# Patient Record
Sex: MALE | Race: BLACK OR AFRICAN AMERICAN
[De-identification: names, ages, dates, MRNs, and addresses within clinical notes are randomized per-mention and may not be internally consistent; named-entity substitution may affect disease eponyms.]

---

## 2017-01-20 NOTE — EMERGENCY ROOM REPORT
History of Present Illness


General


Chief Complaint:  Nausea, Vomiting, and Diarrhea


Source:  Patient, Medical Record





Present Illness


HPI


50 YO M with 2 days of hematemesis, abd pain, hematuria.  States called Dr Guillermo yesterday, who told him to come to ED but he didnt because "I had stuff 

to take care of." States abd pain is due to "chronic pancreatitis" and 

"gastroparesis."  No vomiting blood today. No coughing blood.  No melena or 

blood in stool.


Allergies:  


Coded Allergies:  


     IODINATED CONTRAST MEDIA - IV DYE (Verified  Allergy, Severe, Shortness of 

Breath, 9/16/15)


     DORIPENEM (Verified  Allergy, Intermediate, Itching, 9/16/15)


     KETOROLAC TROMETHAMINE (Verified  Allergy, Intermediate, Hives, 9/16/15)


     TRAMADOL (Verified  Allergy, Intermediate, Hives, 9/16/15)


     ASPIRIN (Verified  Allergy, Mild, 12/7/10)





Patient History


Past Medical History:  see triage record, old chart reviewed


Past Surgical History:  other - Lapaortomy for "cancer" but "I didnt have it."


Pertinent Family History:  none


Social History:  Denies: alcohol use, drug use, smoking


Immunizations:  UTD


Reviewed Nursing Documentation:  PMH: Agreed, PSxH: Agreed





Nursing Documentation-PMH


Past Medical History:  No History, Except For


Hx Cardiac Problems:  Yes - CHF


Hx Hypertension:  Yes


Hx Pacemaker:  No - PE


Hx COPD:  No - PE, IVC filter


Hx Diabetes:  Yes


Hx Cancer:  No


Hx Gastrointestinal Problems:  Yes


Hx Dialysis:  No - DIVERTICULITIS


Hx Neurological Problems:  Yes


Hx Concentration Difficulty:  Yes


Hx Dizziness:  Yes


Hx Syncope:  Yes


Hx Weakness:  Yes


Hx Neurologic Surgery:  No





Review of Systems


All Other Systems:  negative except mentioned in HPI





Physical Exam





Vital Signs








  Date Time  Temp Pulse Resp B/P Pulse Ox O2 Delivery O2 Flow Rate FiO2


 


1/20/17 19:46 97.2 90 21 147/72 100 Room Air  








Sp02 EP Interpretation:  reviewed, normal


General Appearance:  normal inspection, well appearing, no apparent distress, 

alert


Head:  normocephalic, atraumatic


Eyes:  bilateral eye EOMI, bilateral eye PERRL


ENT:  normal ENT inspection, hearing grossly normal, normal voice


Neck:  normal inspection, full range of motion, supple, no bony tend


Respiratory:  normal inspection, lungs clear, normal breath sounds, no 

respiratory distress, no retraction, no wheezing


Cardiovascular #1:  regular rate, rhythm, no edema


Gastrointestinal:  normal inspection, normal bowel sounds, non tender, soft, no 

guarding, no hernia, other - midline laparatomy scar


Genitourinary:  no CVA tenderness


Neurologic:  normal inspection, alert, responsive, speech normal


Psychiatric:  normal inspection, judgement/insight normal, mood/affect normal


Skin:  normal inspection, normal color, no rash





Medical Decision Making


Diagnostic Impression:  


 Primary Impression:  


 Abdominal pain


 Qualified Codes:  R10.84 - Generalized abdominal pain


 Additional Impressions:  


 Hypokalemia


 Hypocalcemia


ER Course


50 YO M with alleged hematemesis yesterday and abd pain.  VSS. Afebrile.


No vomiting blood in ED.


LabsL H&H Stable.


Critically low Ca and K


Repleted with Calcium chloride and PO and IV K


Endorsed to Dr Guillermo at 1017pm for med/surg admission


CTAP in progress - endorsed to Dr Borjas in ED to followup CTAP results and 

inform Dr Guillermo of any acute findings.


Rhythm Strip Diag. Results


EP Interpretation:  yes


Rate:  91


Rhythm:  NSR, no PVC's, no ectopy





Last Vital Signs








  Date Time  Temp Pulse Resp B/P Pulse Ox O2 Delivery O2 Flow Rate FiO2


 


1/20/17 19:56  86 19 135/81 95 Room Air  


 


1/20/17 19:46 97.2       








Status:  improved


Disposition:  ADMITTED AS INPATIENT


Condition:  Serious


Referrals:  


TUCKER GUILLERMO (PCP)











FER CANNON M.D. Jan 20, 2017 22:18

## 2017-01-21 NOTE — HISTORY AND PHYSICAL REPORT
DATE OF ADMISSION:  01/20/2017



TIME SEEN:  8 a.m.



ATTENDING PHYSICIAN:  Raimundo Zambrano D.O.



CONSULTANTS:

1. Sudha Light M.D.

2. Behnoush Zarrini, M.D.

3. Fabian Cleaning M.D.

4. Josh Cleveland M.D.

5. Salomón Parmar M.D.

6. Dr. Pedraza.



CHIEF COMPLAINT:  Nausea, vomiting, GI bleed, hypokalemia.



BRIEF HISTORY:  This is a 51-year-old male who lives at home presents

to [facility] last night with increased nausea, vomiting, GI bleed,

exacerbation of his chronic pain and hypokalemia.  The patient was

admitted to medical floor for further treatment.  Currently, slightly

anxious in bed.



PAST MEDICAL HISTORY:  Include hypertension., diabetes,

gastroparesis, chronic pain, anxiety.



PAST SURGICAL HISTORY:  Stomach surgery.



MEDICATIONS:  Morphine, Vasotec, Carafate, warfarin, Tylenol,

MiraLAX, Zofran, Vistaril, Benadryl, Mylanta, nitroglycerin injection.



ALLERGIES:  Contrast, tramadol, Toradol.



SOCIAL HISTORY:  No smoking.  No alcohol.  No intravenous drug use.



FAMILY HISTORY:  Noncontributory.



REVIEW OF SYSTEMS:  No chest pain/shortness of breath.  Slight

nausea, vomiting.  No diarrhea.



PHYSICAL EXAMINATION:

GENERAL:  The patient is anxious in bed, alert and oriented x3, in no

acute distress.

VITAL SIGNS:  Temperature 97 degrees, pulse 70, respiratory rate 20,

and blood pressure 117/67.

CARDIOVASCULAR:  Distant without murmur

LUNGS:

ABDOMEN:  Bowel sounds positive.  Nontender and nondistended.

EXTREMITIES:  No cyanosis, clubbing, or edema.

NEUROLOGIC:  Cranial nerves II through XII are grossly intact.  Deep

tendon reflexes 2+/4.  Muscle strength 4/5.



LABORATORY DATA:  Show hemoglobin 11.9, otherwise CBC is normal.  BMP

shows creatinine 1.2, yesterday potassium 2.7 now is 4.8, glucose today

111.  Amylase 221.  INR 1.2.  Urinalysis shows leukocyte esterase 1+.



ASSESSMENT:

1. Nausea and vomiting.

2. Urinary tract infection.

3. Gastrointestinal bleeding.

4. Anemia.

5. Hypotension.

6. Diabetes.

7. Gastroparesis.

8. Hypokalemia.

9. Anxiety.

10. Chronic pain.



PLAN:

1. Continue premedications.

2. Antibiotics per Infectious Diseases.

3. GI followup.

4. Blood pressure and blood sugar control.

5. Dietary followup.

6. OT/OT and dietary evaluation.

7. CBC and BMP in the morning.

8. We will continue to follow this patient



Dr. Light, Dr. Peralta, Dr. Cleaning, Dr. Cleveland, Dr. Parmar,

Dr. Pedraza, Dr. Cantu consult. We will continue to follow this patient









  ______________________________________________

  Raimundo Zambrano D.O.





DR:  Glen

D:  01/21/2017 08:52

T:  01/21/2017 19:54

JOB#:  [id number]

CC:

## 2017-01-21 NOTE — DIAGNOSTIC IMAGING REPORT
Indication: Nausea and vomiting



Technique: CT scan of the abdomen and pelvis utilizing automated exposure control

without intravenous or oral contrast. Axial, sagittal and coronal images were

obtained.



CT dose: Total DLP 1029 mGycm; CTDI vol 18.4 mGy



Comparison: 12/7/14



Findings:



Evaluation of the solid organs is limited without intravenous contrast material.

There is atelectasis in the lung bases. There is a small hiatal hernia. Liver and

adrenal glands are grossly unremarkable. Spleen and pancreas are grossly

unremarkable. IVC filter is present. No CT evident gallstones are identified. There

is a nonobstructive calculus of the left kidney measuring 6 mm. There is 

no

hydronephrosis or ureteral calculi. The appendix is normal. There is no 

definitive

mechanical bowel obstruction. Colonic diverticulosis is noted without

diverticulitis. Bladder is grossly unremarkable. Abdominal aorta is normal in

caliber. Osseous structures demonstrate no acute abnormality.



Impression:



Limited evaluation of solid organs and bowel without intravenous or oral contrast.



Nonobstructive left renal calculus. No hydronephrosis.



IVC filter.



Colonic diverticulosis.



Small hiatal hernia.



Other findings as above.



The CT scanner at [facility] is accredited by the [employer] College 

of

Radiology and the scans are performed using protocols designed to limit 

radiation

exposure to as low as reasonably achievable to attain images of sufficient

resolution adequate for diagnostic evaluation.

## 2017-01-21 NOTE — INFECTIOUS DISEASES PROG NOTE
Assessment/Plan


Problems:  


(1) UTI (urinary tract infection)


Assessment & Plan:  will start ceftriaxon empirically and send urine for culture





(2) Hypokalemia


Assessment & Plan:  suspect dehydration related from vomiting, replace as needed

, continue IVF for hydration





(3) Hypocalcemia


Assessment & Plan:  most likely due to GI loss, replace as needed, monitor 

calcium level





(4) UGIB (upper gastrointestinal bleed)


Assessment & Plan:  keep NPO, consult GI, monitor H&H, transfuse as needed





(5) Intractable abdominal pain


Assessment & Plan:  continue pain management as per primary , consider CT 

abdomen if not improved








Subjective


Allergies:  


Coded Allergies:  


     IODINATED CONTRAST MEDIA - IV DYE (Verified  Allergy, Severe, Shortness of 

Breath, 9/16/15)


     DORIPENEM (Verified  Allergy, Intermediate, Itching, 9/16/15)


     KETOROLAC TROMETHAMINE (Verified  Allergy, Intermediate, Hives, 9/16/15)


     TRAMADOL (Verified  Allergy, Intermediate, Hives, 9/16/15)


     ASPIRIN (Verified  Allergy, Mild, 12/7/10)





Objective


Vital Signs





Last 24 Hour Vital Signs








  Date Time  Temp Pulse Resp B/P Pulse Ox O2 Delivery O2 Flow Rate FiO2


 


1/21/17 08:47    117/67    


 


1/21/17 08:35 97.0 70 20 117/67 96 Room Air  


 


1/21/17 04:00 97.5 74 20 126/85 98 Room Air  


 


1/21/17 02:28 97.2 62 21 133/88 99 Room Air  


 


1/21/17 02:20 97.3 55 20 142/89 91 Room Air  


 


1/21/17 01:48  62 21 133/88 99 Room Air  


 


1/21/17 00:32  75 12 130/73 97 Room Air  


 


1/20/17 22:52 97.2       


 


1/20/17 22:50  65 16 143/77 98 Room Air  


 


1/20/17 19:56  86 19 135/81 95 Room Air  


 


1/20/17 19:46 97.2 90 21 147/72 100 Room Air  








Height (Feet):  6


Height (Inches):  1.00


Weight (Pounds):  239





Laboratory Tests








Test


  1/20/17


20:58 1/20/17


22:00 1/21/17


03:20


 


Urine Color Yellow    


 


Urine Appearance Cloudy    


 


Urine pH 5 (4.5-8.0)    


 


Urine Specific Gravity


  1.025


(1.005-1.035) 


  


 


 


Urine Protein


  2+ (NEGATIVE)


H 


  


 


 


Urine Glucose (UA)


  Negative


(NEGATIVE) 


  


 


 


Urine Ketones


  1+ (NEGATIVE)


H 


  


 


 


Urine Occult Blood


  5+ (NEGATIVE)


H 


  


 


 


Urine Nitrite


  Negative


(NEGATIVE) 


  


 


 


Urine Bilirubin


  Negative


(NEGATIVE) 


  


 


 


Urine Urobilinogen


  Normal MG/DL


(0.0-1.0) 


  


 


 


Urine Leukocyte Esterase


  1+ (NEGATIVE)


H 


  


 


 


Urine RBC


  Tntc /HPF (0 -


0)  H 


  


 


 


Urine WBC


  Tntc /HPF (0 -


0)  H 


  


 


 


Urine Squamous Epithelial


Cells Few /LPF


(NONE/OCC) 


  


 


 


Urine Bacteria


  Moderate /HPF


(NONE)  H 


  


 


 


Sodium Level


  148 mEQ/L


(135-145)  H 


  143 mEQ/L


(135-145)


 


Potassium Level


  2.7 mEQ/L


(3.4-4.9)  *L 


  4.8 mEQ/L


(3.4-4.9)  #


 


Chloride Level


  120 mEQ/L


()  H 


  106 mEQ/L


()


 


Carbon Dioxide Level


  16 mEQ/L


(20-30)  L 


  23 mEQ/L


(20-30)


 


Anion Gap 12 (5-15)    14 (5-15)  


 


Blood Urea Nitrogen


  12 mg/dL


(7-23) 


  17 mg/dL


(7-23)


 


Creatinine


  1.1 mg/dL


(0.7-1.2) 


  1.5 mg/dL


(0.7-1.2)  H


 


Estimat Glomerular Filtration


Rate > 60 mL/min


(>60) 


  59.5 mL/min


(>60)


 


Glucose Level


  85 mg/dL


() 


  111 mg/dL


()  H


 


Calcium Level


  5.6 mg/dL


(8.6-10.2)  *L 


  9.5 mg/dL


(8.6-10.2)  #


 


Total Bilirubin


  < 0.2 mg/dL


(0.0-1.2) 


  0.3 mg/dL


(0.0-1.2)


 


Aspartate Amino Transf


(AST/SGOT) 16 U/L (5-40)  


  


  27 U/L (5-40)  


 


 


Alanine Aminotransferase


(ALT/SGPT) 12 U/L (3-41)  


  


  20 U/L (3-41)  


 


 


Alkaline Phosphatase


  73 U/L


() 


  132 U/L


()  H


 


Total Protein


  5.0 g/dL


(6.6-8.7)  L 


  8.2 g/dL


(6.6-8.7)  #


 


Albumin


  2.5 g/dL


(3.5-5.2)  L 


  4.1 g/dL


(3.5-5.2)


 


Globulin 2.5 g/dL    4.1 g/dL  


 


Albumin/Globulin Ratio 1.0 (1.0-2.7)    1.0 (1.0-2.7)  


 


Lipase 53 U/L (< 60)    


 


White Blood Count


  


  5.2 K/UL


(4.8-10.8) 5.0 K/UL


(4.8-10.8)


 


Red Blood Count


  


  4.38 M/UL


(4.70-6.10)  L 4.38 M/UL


(4.70-6.10)  L


 


Hemoglobin


  


  11.8 G/DL


(14.2-18.0)  L 11.9 G/DL


(14.2-18.0)  L


 


Hematocrit


  


  39.2 %


(42.0-52.0)  L 38.0 %


(42.0-52.0)  L


 


Mean Corpuscular Volume  89 FL (80-99)   87 FL (80-99)  


 


Mean Corpuscular Hemoglobin


  


  26.8 PG


(27.0-31.0)  L 27.1 PG


(27.0-31.0)


 


Mean Corpuscular Hemoglobin


Concent 


  30.0 G/DL


(32.0-36.0)  L 31.2 G/DL


(32.0-36.0)  L


 


Red Cell Distribution Width


  


  15.4 %


(11.6-14.8)  H 15.0 %


(11.6-14.8)  H


 


Platelet Count


  


  161 K/UL


(150-450) 172 K/UL


(150-450)


 


Mean Platelet Volume


  


  6.9 FL


(6.5-10.1) 7.1 FL


(6.5-10.1)


 


Neutrophils (%) (Auto)


  


  44.6 %


(45.0-75.0)  L 42.1 %


(45.0-75.0)  L


 


Lymphocytes (%) (Auto)


  


  39.1 %


(20.0-45.0) 44.6 %


(20.0-45.0)


 


Monocytes (%) (Auto)


  


  12.8 %


(1.0-10.0)  H 9.0 %


(1.0-10.0)


 


Eosinophils (%) (Auto)


  


  1.9 %


(0.0-3.0) 2.4 %


(0.0-3.0)


 


Basophils (%) (Auto)


  


  1.7 %


(0.0-2.0) 1.8 %


(0.0-2.0)


 


Erythrocyte Sedimentation Rate


  


  


  70 MM/HR


(0-20)  H


 


Reticulocyte Count


  


  


  0.8 %


(0.0-2.0)


 


Prothrombin Time


  


  


  11.9 SEC


(9.30-11.50)  H


 


Prothromb Time International


Ratio 


  


  1.2 (0.9-1.1)


H


 


Activated Partial


Thromboplast Time 


  


  25 SEC (23-33)


 


 


Iron Level


  


  


  61 ug/dL


()


 


Total Iron Binding Capacity


  


  


  394 ug/dL


(250-400)


 


Percent Iron Saturation   15 % (15-50)  


 


Unsaturated Iron Binding


  


  


  333 ug/dL


(112-346)


 


Lactate Dehydrogenase


  


  


  262 U/L


(135-230)  H


 


Amylase Level


  


  


  221 U/L


()  H


 


Carcinoembryonic Antigen   4.8 ng/mL  H


 


Vitamin B12 Level


  


  


  468 pg/mL


(211-946)


 


Folate   Pending  











Current Medications








 Medications


  (Trade)  Dose


 Ordered  Sig/Kirsten


 Route


 PRN Reason  Start Time


 Stop Time Status Last Admin


Dose Admin


 


 Acetaminophen


  (Tylenol)  650 mg  Q4H  PRN


 ORAL


 fever  1/20/17 23:30


 2/19/17 23:29   


 


 


 Al Hydroxide/Mg


 Hydroxide


  (Mylanta II)  30 ml  Q6H  PRN


 ORAL


 dyspepsia  1/20/17 23:30


 2/19/17 23:29   


 


 


 Dextrose     STAT  PRN


 IV


 Hypoglycemia  1/20/17 23:30


 2/19/17 23:29   


 


 


 Dextrose/Sodium


 Chloride


  (D5 0.45% NS)  1,000 ml @ 


 50 mls/hr  Q20H


 IV


   1/21/17 07:00


 2/20/17 06:59  1/21/17 07:34


 


 


 Diphenhydramine


 HCl


  (Benadryl)  25 mg  Q6H  PRN


 ORAL


 Itching/Pruritis  1/20/17 23:30


 2/19/17 23:29  1/21/17 07:55


 


 


 Enalapril Maleate


  (Vasotec)  10 mg  DAILY


 ORAL


   1/21/17 09:00


 2/20/17 08:59   


 


 


 Morphine Sulfate


  (Morphine


 Sulfate)  4 mg  Q4H  PRN


 IVP


 For Pain (Moderate to Severe)  1/21/17 10:45


 1/28/17 10:44   


 


 


 Nitroglycerin


  (Ntg)  0.4 mg  Q5M X 3 DOSES PRN


 SL


 Prn Chest Pain  1/20/17 23:30


 2/19/17 23:29   


 


 


 Ondansetron HCl


  (Zofran)  4 mg  Q6H  PRN


 IVP


 Nausea & Vomiting  1/20/17 23:30


 2/19/17 23:29  1/21/17 07:54


 


 


 Polyethylene


 Glycol


  (Miralax)  17 gm  HSPRN  PRN


 ORAL


 Constipation  1/20/17 23:30


 2/19/17 23:29   


 


 


 Sucralfate


  (Carafate)  1 gm  FOUR TIMES A  DAY


 ORAL


   1/21/17 09:00


 2/20/17 08:59   


 


 


 Temazepam


  (Restoril)  15 mg  HSPRN  PRN


 ORAL


 Insomnia  1/20/17 23:30


 1/27/17 23:29   


 


 


 Warfarin Sodium


  (Coumadin per


 pharmacy)  1 ea  DAILYPRN  PRN


 MISC


 Per rx protocol  1/21/17 07:00


 2/20/17 06:59   


 

















Eitan Cantu M.D. Jan 21, 2017 09:13

## 2017-01-22 NOTE — GENERAL PROGRESS NOTE
Assessment/Plan


Problem List:  


(1) Renal insufficiency


ICD Codes:  N28.9 - Disorder of kidney and ureter, unspecified


SNOMED:  632637333


(2) Intractable vomiting


ICD Codes:  R11.10 - Vomiting, unspecified


SNOMED:  610691932


(3) Abdominal pain of unknown etiology


ICD Codes:  R10.9 - Unspecified abdominal pain


SNOMED:  700322412


(4) UTI (urinary tract infection)


ICD Codes:  N39.0 - Urinary tract infection, site not specified


SNOMED:  82401715


(5) Intractable abdominal pain


ICD Codes:  R10.9 - Intractable abdominal pain


SNOMED:  05787894


(6) Hypokalemia


ICD Codes:  E87.6 - Hypokalemia


SNOMED:  86651765


(7) GI bleeding


(8) Anemia


ICD Codes:  D64.9 - Anemia, unspecified


SNOMED:  536370438


Status:  stable, progressing


Assessment/Plan


ot pt diet pain control gi f/u cbc bmp am





Subjective


Constitutional:  Reports: weakness


Allergies:  


Coded Allergies:  


     IODINATED CONTRAST MEDIA - IV DYE (Verified  Allergy, Severe, Shortness of 

Breath, 9/16/15)


     DORIPENEM (Verified  Allergy, Intermediate, Itching, 9/16/15)


     KETOROLAC TROMETHAMINE (Verified  Allergy, Intermediate, Hives, 9/16/15)


     TRAMADOL (Verified  Allergy, Intermediate, Hives, 9/16/15)


     ASPIRIN (Verified  Allergy, Mild, 12/7/10)


All Systems:  reviewed and negative except above


Subjective


sleepy calm sl nausea / gen pain





Objective





Last 24 Hour Vital Signs








  Date Time  Temp Pulse Resp B/P Pulse Ox O2 Delivery O2 Flow Rate FiO2


 


1/22/17 08:00 97.7 73 20 123/84 97 Room Air  


 


1/22/17 04:00 97.1 72 20 122/70 98 Room Air  


 


1/22/17 00:00 97.9 78 18 130/72 99 Room Air  


 


1/21/17 21:03 97.2       


 


1/21/17 20:00 97.7 80 20 149/91 100 Room Air  


 


1/21/17 16:30 97.2 90 20 126/74 96 Room Air  

















Intake and Output  


 


 1/21/17 1/22/17





 19:00 07:00


 


Intake Total 915 ml 745 ml


 


Balance 915 ml 745 ml


 


  


 


Intake Oral 360 ml 120 ml


 


IV Total 555 ml 625 ml


 


# Voids 2 2








Laboratory Tests


1/22/17 04:35: 


White Blood Count 4.8, Red Blood Count 4.19L, Hemoglobin 11.3L, Hematocrit 37.9L

, Mean Corpuscular Volume 90, Mean Corpuscular Hemoglobin 26.9L, Mean 

Corpuscular Hemoglobin Concent 29.7L, Red Cell Distribution Width 15.0H, 

Platelet Count 162, Mean Platelet Volume 7.4, Neutrophils (%) (Auto) 45.0, 

Lymphocytes (%) (Auto) 37.6, Monocytes (%) (Auto) 11.4H, Eosinophils (%) (Auto) 

3.6H, Basophils (%) (Auto) 2.4H, Prothrombin Time 11.4, Prothromb Time 

International Ratio 1.1, Activated Partial Thromboplast Time 23, Sodium Level 

143, Potassium Level 4.9, Chloride Level 103, Carbon Dioxide Level 27, Anion 

Gap 13, Blood Urea Nitrogen 21, Creatinine 1.7H, Estimat Glomerular Filtration 

Rate 51.5, Glucose Level 119H, Calcium Level 9.0, Amylase Level 182H, Lipase 96H


Height (Feet):  6


Height (Inches):  1.00


Weight (Pounds):  239


General Appearance:  lethargic


EENT:  normal ENT inspection


Neck:  normal alignment


Cardiovascular:  normal peripheral pulses, normal rate, regular rhythm


Respiratory/Chest:  chest wall non-tender, lungs clear, normal breath sounds


Abdomen:  normal bowel sounds, non tender, soft


Extremities:  normal inspection


Edema:  no edema noted Arm (L), no edema noted Arm (R), no edema noted Leg (L), 

no edema noted Leg (R), no edema noted Pedal (L), no edema noted Pedal (R), no 

edema noted Generalized


Neurologic:  motor weakness


Skin:  normal pigmentation, warm/dry











TUCKER GUILLERMO Jan 22, 2017 08:58

## 2017-01-22 NOTE — GENERAL PROGRESS NOTE
Assessment/Plan


Assessment/Plan


IMPRESSION:  


1. Anemia 2/2 GI bleed - currently h/h stable, being evaluated by GI


2. Anemia of chronic disease.


3. Anemia of kidney disease.


4. Chronic kidney disease.


5. Coagulopathy, secondary to Coumadin.


6. Hypercoagulable state.


7. History of deep vein thrombosis s/p ivc filter


8. History of pulmonary embolism s/p ivc filter


9. Status post IVC filter placement.


10. Anticoagulation with Coumadin.


11. Drug-seeking behavior.


 


RECOMMENDATIONS:  


1. Watch count.


2. Watch coagulopathy.


3. Coumadin to continue


4. INR 2-3 goal


5. PRBC transfusion as needed basis, goal >7


6. Followup on ID, GI, pulm recs


7. Skin care.


8.  Staff.





Sincerely,





Caterina Parmar MD





Subjective


Constitutional:  Reports: no symptoms


HEENT:  Reports: no symptoms


Cardiovascular:  Reports: no symptoms


Respiratory:  Reports: no symptoms


Gastrointestinal/Abdominal:  Reports: poor appetite


Genitourinary:  Reports: no symptoms


Neurologic/Psychiatric:  Reports: no symptoms


Endocrine:  Reports: no symptoms


Hematologic/Lymphatic:  Reports: anemia


Allergies:  


Coded Allergies:  


     IODINATED CONTRAST MEDIA - IV DYE (Verified  Allergy, Severe, Shortness of 

Breath, 9/16/15)


     DORIPENEM (Verified  Allergy, Intermediate, Itching, 9/16/15)


     KETOROLAC TROMETHAMINE (Verified  Allergy, Intermediate, Hives, 9/16/15)


     TRAMADOL (Verified  Allergy, Intermediate, Hives, 9/16/15)


     ASPIRIN (Verified  Allergy, Mild, 12/7/10)


Subjective


stable h/h, currently not bleeding





Objective





Last 24 Hour Vital Signs








  Date Time  Temp Pulse Resp B/P Pulse Ox O2 Delivery O2 Flow Rate FiO2


 


1/22/17 09:00    123/84    


 


1/22/17 08:25 97.7       


 


1/22/17 08:00 97.7 73 20 123/84 97 Room Air  


 


1/22/17 04:00 97.1 72 20 122/70 98 Room Air  


 


1/22/17 00:00 97.9 78 18 130/72 99 Room Air  


 


1/21/17 20:00 97.7 80 20 149/91 100 Room Air  


 


1/21/17 16:30 97.2 90 20 126/74 96 Room Air  

















Intake and Output  


 


 1/21/17 1/22/17





 19:00 07:00


 


Intake Total 915 ml 745 ml


 


Balance 915 ml 745 ml


 


  


 


Intake Oral 360 ml 120 ml


 


IV Total 555 ml 625 ml


 


# Voids 2 2








Laboratory Tests


1/22/17 04:35: 


White Blood Count 4.8, Red Blood Count 4.19L, Hemoglobin 11.3L, Hematocrit 37.9L

, Mean Corpuscular Volume 90, Mean Corpuscular Hemoglobin 26.9L, Mean 

Corpuscular Hemoglobin Concent 29.7L, Red Cell Distribution Width 15.0H, 

Platelet Count 162, Mean Platelet Volume 7.4, Neutrophils (%) (Auto) 45.0, 

Lymphocytes (%) (Auto) 37.6, Monocytes (%) (Auto) 11.4H, Eosinophils (%) (Auto) 

3.6H, Basophils (%) (Auto) 2.4H, Prothrombin Time 11.4, Prothromb Time 

International Ratio 1.1, Activated Partial Thromboplast Time 23, Sodium Level 

143, Potassium Level 4.9, Chloride Level 103, Carbon Dioxide Level 27, Anion 

Gap 13, Blood Urea Nitrogen 21, Creatinine 1.7H, Estimat Glomerular Filtration 

Rate 51.5, Glucose Level 119H, Calcium Level 9.0, Amylase Level 182H, Lipase 96H


Height (Feet):  6


Height (Inches):  1.00


Weight (Pounds):  239


General Appearance:  alert


EENT:  TMs normal


Neck:  supple


Cardiovascular:  regular rhythm


Respiratory/Chest:  lungs clear


Abdomen:  non tender


Extremities:  non-tender


Edema:  1+ Leg (L), 1+ Leg (R)


Edema:  mild edema


Neurologic:  alert


Skin:  warm/dry











CATERINA PARMAR Jan 22, 2017 09:36

## 2017-01-22 NOTE — GENERAL PROGRESS NOTE
Assessment/Plan


Problem List:  


(1) DVT (deep venous thrombosis)


ICD Codes:  I82.409 - Acute embolism and thombos unsp deep vn unsp lower 

extremity


SNOMED:  905018105


(2) Nausea, vomiting, and diarrhea


ICD Codes:  R11.2 - Nausea with vomiting, unspecified; R19.7 - Diarrhea, 

unspecified


SNOMED:  9306378


(3) Abdominal pain


ICD Codes:  R10.9 - Unspecified abdominal pain


SNOMED:  91524431


(4) Anemia


ICD Codes:  D64.9 - Anemia, unspecified


SNOMED:  777444696


(5) Diabetes


ICD Codes:  E11.9 - Diabetes


SNOMED:  98945300


(6) Gastritis


ICD Codes:  K29.70 - Gastritis


SNOMED:  5693912


Assessment/Plan


ppi


stable H&H


fu stool ob


pain control


EGd if needed





Subjective


ROS Limited/Unobtainable:  Yes


Allergies:  


Coded Allergies:  


     IODINATED CONTRAST MEDIA - IV DYE (Verified  Allergy, Severe, Shortness of 

Breath, 9/16/15)


     DORIPENEM (Verified  Allergy, Intermediate, Itching, 9/16/15)


     KETOROLAC TROMETHAMINE (Verified  Allergy, Intermediate, Hives, 9/16/15)


     TRAMADOL (Verified  Allergy, Intermediate, Hives, 9/16/15)


     ASPIRIN (Verified  Allergy, Mild, 12/7/10)


Subjective


no event





Objective





Last 24 Hour Vital Signs








  Date Time  Temp Pulse Resp B/P Pulse Ox O2 Delivery O2 Flow Rate FiO2


 


1/22/17 08:00 97.7 73 20 123/84 97 Room Air  


 


1/22/17 04:00 97.1 72 20 122/70 98 Room Air  


 


1/22/17 00:00 97.9 78 18 130/72 99 Room Air  


 


1/21/17 21:03 97.2       


 


1/21/17 20:00 97.7 80 20 149/91 100 Room Air  


 


1/21/17 16:30 97.2 90 20 126/74 96 Room Air  


 


1/21/17 08:47    117/67    


 


1/21/17 08:35 97.0 70 20 117/67 96 Room Air  

















Intake and Output  


 


 1/21/17 1/22/17





 18:59 06:59


 


Intake Total 915 ml 695 ml


 


Balance 915 ml 695 ml


 


  


 


Intake Oral 360 ml 120 ml


 


IV Total 555 ml 575 ml


 


# Voids 2 2








Laboratory Tests


1/22/17 04:35: 


White Blood Count 4.8, Red Blood Count 4.19L, Hemoglobin 11.3L, Hematocrit 37.9L

, Mean Corpuscular Volume 90, Mean Corpuscular Hemoglobin 26.9L, Mean 

Corpuscular Hemoglobin Concent 29.7L, Red Cell Distribution Width 15.0H, 

Platelet Count 162, Mean Platelet Volume 7.4, Neutrophils (%) (Auto) 45.0, 

Lymphocytes (%) (Auto) 37.6, Monocytes (%) (Auto) 11.4H, Eosinophils (%) (Auto) 

3.6H, Basophils (%) (Auto) 2.4H, Prothrombin Time 11.4, Prothromb Time 

International Ratio 1.1, Activated Partial Thromboplast Time 23, Sodium Level 

143, Potassium Level 4.9, Chloride Level 103, Carbon Dioxide Level 27, Anion 

Gap 13, Blood Urea Nitrogen 21, Creatinine 1.7H, Estimat Glomerular Filtration 

Rate 51.5, Glucose Level 119H, Calcium Level 9.0, Amylase Level [Pending], 

Lipase [Pending]


Height (Feet):  6


Height (Inches):  1.00


Weight (Pounds):  239


General Appearance:  alert


EENT:  normal ENT inspection


Neck:  supple


Cardiovascular:  normal rate


Respiratory/Chest:  lungs clear


Abdomen:  normal bowel sounds, non tender, soft


Extremities:  non-tender











LAMAR ESPINO Jan 22, 2017 08:24

## 2017-01-22 NOTE — DIAGNOSTIC IMAGING REPORT
Indication: Chest pain



Technique: XRAY CHEST 1 V



Comparison: 9/16/15



Findings: Cardiomediastinal silhouette is within normal limits. There is no

consolidation or pleural effusion. Left chest catheter is again noted. Osseous

structures are stable.



Impression:



No acute cardiopulmonary disease.

## 2017-01-22 NOTE — NEPHROLOGY PROGRESS NOTE
Assessment/Plan


Problem List:  


(1) Nausea, vomiting, and diarrhea


(2) Abdominal pain


(3) Hypernatremia


(4) UTI (urinary tract infection)


(5) HIV (human immunodeficiency virus infection)


(6) Hematuria


(7) Hypokalemia


Plan


Continue IVF


Continue pain management


Continue NPO


Monitor lytes


Abx per ID


Monitor intake and output


AM labs





Subjective


Constitutional:  Denies: chills, diaphoresis, fever, malaise, no symptoms, other

, weakness


HEENT:  Denies: blurred vision, double vision, ear discharge, ear pain, eye pain

, mouth pain, mouth swelling, no symptoms, nose congestion, nose pain, other, 

tearing, throat pain, throat swelling


Genitourinary:  Reports: flank pain, other - hematuria


Neurologic/Psychiatric:  Denies: anxiety, depressed, emotional problems, 

headache, no symptoms, numbness, other, paresthesia, pre-existing deficit, 

seizure, tingling, tremors, weakness


Subjective


In bed, in no distress





Objective


Objective





Last 24 Hour Vital Signs








  Date Time  Temp Pulse Resp B/P Pulse Ox O2 Delivery O2 Flow Rate FiO2


 


1/22/17 09:00    123/84    


 


1/22/17 08:25 97.7       


 


1/22/17 08:00 97.7 73 20 123/84 97 Room Air  


 


1/22/17 04:00 97.1 72 20 122/70 98 Room Air  


 


1/22/17 00:00 97.9 78 18 130/72 99 Room Air  


 


1/21/17 20:00 97.7 80 20 149/91 100 Room Air  


 


1/21/17 16:30 97.2 90 20 126/74 96 Room Air  

















Intake and Output  


 


 1/21/17 1/22/17





 18:59 06:59


 


Intake Total 915 ml 695 ml


 


Balance 915 ml 695 ml


 


  


 


Intake Oral 360 ml 120 ml


 


IV Total 555 ml 575 ml


 


# Voids 2 2








Laboratory Tests


1/22/17 04:35: 


White Blood Count 4.8, Red Blood Count 4.19L, Hemoglobin 11.3L, Hematocrit 37.9L

, Mean Corpuscular Volume 90, Mean Corpuscular Hemoglobin 26.9L, Mean 

Corpuscular Hemoglobin Concent 29.7L, Red Cell Distribution Width 15.0H, 

Platelet Count 162, Mean Platelet Volume 7.4, Neutrophils (%) (Auto) 45.0, 

Lymphocytes (%) (Auto) 37.6, Monocytes (%) (Auto) 11.4H, Eosinophils (%) (Auto) 

3.6H, Basophils (%) (Auto) 2.4H, Prothrombin Time 11.4, Prothromb Time 

International Ratio 1.1, Activated Partial Thromboplast Time 23, Sodium Level 

143, Potassium Level 4.9, Chloride Level 103, Carbon Dioxide Level 27, Anion 

Gap 13, Blood Urea Nitrogen 21, Creatinine 1.7H, Estimat Glomerular Filtration 

Rate 51.5, Glucose Level 119H, Calcium Level 9.0, Amylase Level 182H, Lipase 96H


Height (Feet):  6


Height (Inches):  1.00


Weight (Pounds):  239


General Appearance:  no apparent distress, alert


EENT:  normal ENT inspection


Neck:  non-tender, normal alignment, supple, normal inspection


Cardiovascular:  normal rate, regular rhythm, no JVD


Respiratory/Chest:  chest wall non-tender, lungs clear, normal breath sounds, 

no respiratory distress


Abdomen:  soft, no organomegaly, no mass, tender


Extremities:  non-tender, normal inspection, no calf tenderness


Neurologic:  alert, oriented x 3, responsive, normal mood/affect











Berenice Carroll N.P. Jan 22, 2017 11:13

## 2017-01-22 NOTE — INFECTIOUS DISEASES PROG NOTE
Assessment/Plan


Problems:  


(1) UTI (urinary tract infection)


Assessment & Plan:  continue  ceftriaxon empirically and send urine for culture





(2) Hypokalemia


Assessment & Plan:  suspect dehydration related from vomiting, replace as needed

, continue IVF for hydration





(3) Hypocalcemia


Assessment & Plan:  most likely due to GI loss, replace as needed, monitor 

calcium level





(4) UGIB (upper gastrointestinal bleed)


Assessment & Plan:  keep NPO, consult GI, monitor H&H, transfuse as needed





(5) Intractable abdominal pain


Assessment & Plan:  continue pain management as per primary ,  CT abdomen and 

pelvis showed nonobstructing stone .








Subjective


Gastrointestinal/Abdominal:  Reports: nausea


Skin:  Reports: other - itching


Allergies:  


Coded Allergies:  


     IODINATED CONTRAST MEDIA - IV DYE (Verified  Allergy, Severe, Shortness of 

Breath, 9/16/15)


     DORIPENEM (Verified  Allergy, Intermediate, Itching, 9/16/15)


     KETOROLAC TROMETHAMINE (Verified  Allergy, Intermediate, Hives, 9/16/15)


     TRAMADOL (Verified  Allergy, Intermediate, Hives, 9/16/15)


     ASPIRIN (Verified  Allergy, Mild, 12/7/10)


All Systems:  reviewed and negative except above





Objective


Vital Signs





Last 24 Hour Vital Signs








  Date Time  Temp Pulse Resp B/P Pulse Ox O2 Delivery O2 Flow Rate FiO2


 


1/22/17 16:02 97.1 80 20 135/82 100   


 


1/22/17 15:34 97.9       


 


1/22/17 12:05 97.9 83 22 119/75 100 Room Air  


 


1/22/17 09:00    123/84    


 


1/22/17 08:00 97.7 73 20 123/84 97 Room Air  


 


1/22/17 04:00 97.1 72 20 122/70 98 Room Air  


 


1/22/17 00:00 97.9 78 18 130/72 99 Room Air  


 


1/21/17 20:00 97.7 80 20 149/91 100 Room Air  


 


1/21/17 16:30 97.2 90 20 126/74 96 Room Air  








Height (Feet):  6


Height (Inches):  1.00


Weight (Pounds):  239


General Appearance:  WD/WN, no acute distress


HEENT:  normocephalic, atraumatic, anicteric, mucous membranes moist, PERRL


Respiratory/Chest:  chest wall non-tender, lungs clear, normal breath sounds, 

no respiratory distress, no accessory muscle use


Cardiovascular:  normal peripheral pulses, normal rate, regular rhythm, no 

gallop/murmur, no JVD


Abdomen:  normal bowel sounds, no organomegaly, non distended, no mass, no scars

, distended, tender


Extremities:  no cyanosis, no clubbing


Skin:  no rash, no lesions, no ulcers





Microbiology








 Date/Time


Source Procedure


Growth Status


 


 


 1/20/17 20:58


Urine,Clean Catch Urine Culture - Preliminary


NO GROWTH AFTER 24 HOURS Resulted











Laboratory Tests








Test


  1/22/17


04:35


 


White Blood Count


  4.8 K/UL


(4.8-10.8)


 


Red Blood Count


  4.19 M/UL


(4.70-6.10)  L


 


Hemoglobin


  11.3 G/DL


(14.2-18.0)  L


 


Hematocrit


  37.9 %


(42.0-52.0)  L


 


Mean Corpuscular Volume 90 FL (80-99)  


 


Mean Corpuscular Hemoglobin


  26.9 PG


(27.0-31.0)  L


 


Mean Corpuscular Hemoglobin


Concent 29.7 G/DL


(32.0-36.0)  L


 


Red Cell Distribution Width


  15.0 %


(11.6-14.8)  H


 


Platelet Count


  162 K/UL


(150-450)


 


Mean Platelet Volume


  7.4 FL


(6.5-10.1)


 


Neutrophils (%) (Auto)


  45.0 %


(45.0-75.0)


 


Lymphocytes (%) (Auto)


  37.6 %


(20.0-45.0)


 


Monocytes (%) (Auto)


  11.4 %


(1.0-10.0)  H


 


Eosinophils (%) (Auto)


  3.6 %


(0.0-3.0)  H


 


Basophils (%) (Auto)


  2.4 %


(0.0-2.0)  H


 


Prothrombin Time


  11.4 SEC


(9.30-11.50)


 


Prothromb Time International


Ratio 1.1 (0.9-1.1)  


 


 


Activated Partial


Thromboplast Time 23 SEC (23-33)


 


 


Sodium Level


  143 mEQ/L


(135-145)


 


Potassium Level


  4.9 mEQ/L


(3.4-4.9)


 


Chloride Level


  103 mEQ/L


()


 


Carbon Dioxide Level


  27 mEQ/L


(20-30)


 


Anion Gap 13 (5-15)  


 


Blood Urea Nitrogen


  21 mg/dL


(7-23)


 


Creatinine


  1.7 mg/dL


(0.7-1.2)  H


 


Estimat Glomerular Filtration


Rate 51.5 mL/min


(>60)


 


Glucose Level


  119 mg/dL


()  H


 


Calcium Level


  9.0 mg/dL


(8.6-10.2)


 


Amylase Level


  182 U/L


()  H


 


Lipase


  96 U/L (< 60)


H











Current Medications








 Medications


  (Trade)  Dose


 Ordered  Sig/Kirsten


 Route


 PRN Reason  Start Time


 Stop Time Status Last Admin


Dose Admin


 


 Acetaminophen


  (Tylenol)  650 mg  Q4H  PRN


 ORAL


 fever  1/20/17 23:30


 2/19/17 23:29   


 


 


 Al Hydroxide/Mg


 Hydroxide


  (Mylanta II)  30 ml  Q6H  PRN


 ORAL


 dyspepsia  1/20/17 23:30


 2/19/17 23:29   


 


 


 Ceftriaxone


 Sodium/Dextrose


  (Rocephin/D5W)  55 ml @ 


 110 mls/hr  Q24H


 IVPB


   1/21/17 11:00


 1/28/17 10:59  1/22/17 11:05


 


 


 Dextrose     STAT  PRN


 IV


 Hypoglycemia  1/20/17 23:30


 2/19/17 23:29   


 


 


 Dextrose/Sodium


 Chloride


  (D5 0.45% NS)  1,000 ml @ 


 50 mls/hr  Q20H


 IV


   1/21/17 07:00


 2/20/17 06:59  1/22/17 00:51


 


 


 Diphenhydramine


 HCl


  (Benadryl)  25 mg  Q6H  PRN


 IVP


 Itching  1/21/17 10:15


 2/20/17 10:14  1/22/17 15:34


 


 


 Enalapril Maleate


  (Vasotec)  10 mg  DAILY


 ORAL


   1/21/17 09:00


 2/20/17 08:59   


 


 


 Morphine Sulfate


 4 mg  4 mg  Q4H  PRN


 IVP


 For Pain (Moderate to Severe)  1/21/17 10:45


 1/28/17 10:44  1/22/17 15:04


 


 


 Nitroglycerin


  (Ntg)  0.4 mg  Q5M X 3 DOSES PRN


 SL


 Prn Chest Pain  1/20/17 23:30


 2/19/17 23:29   


 


 


 Ondansetron HCl


  (Zofran)  4 mg  Q6H  PRN


 IVP


 Nausea & Vomiting  1/20/17 23:30


 2/19/17 23:29  1/22/17 15:04


 


 


 Pantoprazole


  (Protonix)  40 mg  DAILY


 ORAL


   1/22/17 09:00


 2/21/17 08:59   


 


 


 Polyethylene


 Glycol


  (Miralax)  17 gm  HSPRN  PRN


 ORAL


 Constipation  1/20/17 23:30


 2/19/17 23:29   


 


 


 Temazepam


  (Restoril)  15 mg  HSPRN  PRN


 ORAL


 Insomnia  1/20/17 23:30


 1/27/17 23:29   


 


 


 Warfarin Sodium


  (Coumadin per


 pharmacy)  1 ea  DAILYPRN  PRN


 MISC


 Per rx protocol  1/21/17 07:00


 2/20/17 06:59   


 


 


 Warfarin Sodium


  (Coumadin)  5 mg  COUMADIN  ONCE


 ORAL


   1/22/17 17:00


 1/22/17 17:01   


 

















Eitan Cantu M.D. Jan 22, 2017 16:26

## 2017-01-23 NOTE — CARDIOLOGY REPORT
--------------- APPROVED REPORT --------------





EKG Measurement

Heart [id number]

MI [id number]

[id number]

[id number]

[id number]





Normal sinus rhythm with sinus arrhythmia

Normal ECG

## 2017-01-23 NOTE — INFECTIOUS DISEASES PROG NOTE
Assessment/Plan


Problems:  


(1) HIV (human immunodeficiency virus infection)


Assessment & Plan:  with positive screening test, will confirm, and order viral 

load, with CD4 level. patient denied any previous exposure to HIV. except 

multiple blood transfusion in the past





(2) UTI (urinary tract infection)


Assessment & Plan:  continue  ceftriaxon empirically and await urine culture





(3) Hypokalemia


Assessment & Plan:  improved, suspect dehydration related from vomiting, 

replace as needed, continue IVF for hydration





(4) Hypocalcemia


Assessment & Plan:  improved, most likely due to GI loss, replace as needed, 

monitor calcium level





(5) UGIB (upper gastrointestinal bleed)


Assessment & Plan:  keep NPO, may need EGD,  GI is following , monitor H&H, 

transfuse as needed





(6) Intractable abdominal pain


Assessment & Plan:  continue pain management as per primary ,  CT abdomen and 

pelvis showed nonobstructing stone .








Subjective


Constitutional:  Denies: anorexia, chills, drenching sweats, fatigue, fever, no 

symptoms, other


HEENT:  Denies: congestion, coryza, dysphagia, hearing change, no symptoms, 

other, visual change


Respiratory:  Denies: dry cough, no symptoms, other, productive cough, 

shortness of breath


Breasts:  Denies: discharge, no symptoms, other, swelling, tenderness


Cardiovascular:  Denies: chest pain, dyspnea on exertion, no symptoms, other, 

palpitations


Gastrointestinal/Abdominal:  Reports: bloating, constipation, other - pain


Genitourinary:  Denies: dysuria, frequency, hematuria, no symptoms, nocturia, 

other


Neurologic:  Denies: confusion, headache, no symptoms, numbness, other, weakness


Psychiatric:  Denies: anxiety, depression, no symptoms, other


Skin:  Denies: no symptoms, other, rash, ulcer


Allergies:  


Coded Allergies:  


     IODINATED CONTRAST MEDIA - IV DYE (Verified  Allergy, Severe, Shortness of 

Breath, 9/16/15)


     DORIPENEM (Verified  Allergy, Intermediate, Itching, 9/16/15)


     KETOROLAC TROMETHAMINE (Verified  Allergy, Intermediate, Hives, 9/16/15)


     TRAMADOL (Verified  Allergy, Intermediate, Hives, 9/16/15)


     ASPIRIN (Verified  Allergy, Mild, 12/7/10)





Objective


Vital Signs





Last 24 Hour Vital Signs








  Date Time  Temp Pulse Resp B/P Pulse Ox O2 Delivery O2 Flow Rate FiO2


 


1/23/17 16:00 97.7 75 22 152/113 100 Room Air  


 


1/23/17 13:05    145/85    


 


1/23/17 12:24 97.2 70 20 145/85 99 Room Air  


 


1/23/17 08:58 97.7 79  114/76 97 Room Air  


 


1/23/17 04:00 97.3 84 22 136/75 95 Room Air  


 


1/23/17 00:00 97.9 87 20 125/68 97 Room Air  


 


1/22/17 20:27 97.1       


 


1/22/17 20:00 98.4 74 22 139/87 99 Room Air  








Height (Feet):  6


Height (Inches):  1.00


Weight (Pounds):  239


General Appearance:  WD/WN, no acute distress


HEENT:  normocephalic, atraumatic, anicteric, mucous membranes moist


Respiratory/Chest:  chest wall non-tender, lungs clear, normal breath sounds, 

no respiratory distress, no accessory muscle use


Cardiovascular:  normal peripheral pulses, normal rate, regular rhythm, no 

gallop/murmur, no JVD


Abdomen:  normal bowel sounds, soft, non tender, no organomegaly, non distended

, no mass, no scars


Extremities:  no cyanosis, no clubbing


Skin:  no rash, no lesions, no ulcers





Microbiology








 Date/Time


Source Procedure


Growth Status


 


 


 1/20/17 20:58


Urine,Clean Catch Urine Culture - Final


NO GROWTH AFTER 48 HOURS Complete











Laboratory Tests








Test


  1/22/17


21:25


 


White Blood Count


  5.1 K/UL


(4.8-10.8)


 


Red Blood Count


  4.19 M/UL


(4.70-6.10)  L


 


Hemoglobin


  11.2 G/DL


(14.2-18.0)  L


 


Hematocrit


  36.9 %


(42.0-52.0)  L


 


Mean Corpuscular Volume 88 FL (80-99)  


 


Mean Corpuscular Hemoglobin


  26.8 PG


(27.0-31.0)  L


 


Mean Corpuscular Hemoglobin


Concent 30.4 G/DL


(32.0-36.0)  L


 


Red Cell Distribution Width


  14.6 %


(11.6-14.8)


 


Platelet Count


  154 K/UL


(150-450)


 


Mean Platelet Volume


  7.0 FL


(6.5-10.1)


 


Neutrophils (%) (Auto)


  33.7 %


(45.0-75.0)  L


 


Lymphocytes (%) (Auto)


  49.6 %


(20.0-45.0)  H


 


Monocytes (%) (Auto)


  11.4 %


(1.0-10.0)  H


 


Eosinophils (%) (Auto)


  3.8 %


(0.0-3.0)  H


 


Basophils (%) (Auto)


  1.5 %


(0.0-2.0)


 


Prothrombin Time


  13.2 SEC


(9.30-11.50)  H


 


Prothromb Time International


Ratio 1.3 (0.9-1.1)


H











Current Medications








 Medications


  (Trade)  Dose


 Ordered  Sig/Kirsten


 Route


 PRN Reason  Start Time


 Stop Time Status Last Admin


Dose Admin


 


 Acetaminophen


  (Tylenol)  650 mg  Q4H  PRN


 ORAL


 fever  1/20/17 23:30


 2/19/17 23:29   


 


 


 Al Hydroxide/Mg


 Hydroxide


  (Mylanta II)  30 ml  Q6H  PRN


 ORAL


 dyspepsia  1/20/17 23:30


 2/19/17 23:29   


 


 


 Ceftriaxone


 Sodium/Dextrose


  (Rocephin/D5W)  55 ml @ 


 110 mls/hr  Q24H


 IVPB


   1/21/17 11:00


 1/28/17 10:59  1/23/17 12:17


 


 


 Dextrose     STAT  PRN


 IV


 Hypoglycemia  1/20/17 23:30


 2/19/17 23:29   


 


 


 Dextrose/Sodium


 Chloride


  (D5 0.45% NS)  1,000 ml @ 


 50 mls/hr  Q20H


 IV


   1/21/17 07:00


 2/20/17 06:59  1/22/17 22:49


 


 


 Diphenhydramine


 HCl


  (Benadryl)  25 mg  Q6H  PRN


 IVP


 Itching  1/21/17 10:15


 2/20/17 10:14  1/23/17 13:02


 


 


 Enalapril Maleate


  (Vasotec)  10 mg  DAILY


 ORAL


   1/21/17 09:00


 2/20/17 08:59  1/23/17 13:05


 


 


 Morphine Sulfate


 4 mg  4 mg  Q4H  PRN


 IVP


 For Pain (Moderate to Severe)  1/21/17 10:45


 1/28/17 10:44  1/23/17 12:59


 


 


 Nitroglycerin


  (Ntg)  0.4 mg  Q5M X 3 DOSES PRN


 SL


 Prn Chest Pain  1/20/17 23:30


 2/19/17 23:29   


 


 


 Ondansetron HCl


  (Zofran)  4 mg  Q6H  PRN


 IVP


 Nausea & Vomiting  1/20/17 23:30


 2/19/17 23:29  1/23/17 12:55


 


 


 Pantoprazole


  (Protonix)  40 mg  DAILY


 ORAL


   1/22/17 09:00


 2/21/17 08:59  1/23/17 13:06


 


 


 Polyethylene


 Glycol


  (Miralax)  17 gm  HSPRN  PRN


 ORAL


 Constipation  1/20/17 23:30


 2/19/17 23:29   


 


 


 Temazepam


  (Restoril)  15 mg  HSPRN  PRN


 ORAL


 Insomnia  1/20/17 23:30


 1/27/17 23:29   


 

















Eitan Cantu M.D. Jan 23, 2017 17:41

## 2017-01-23 NOTE — GI PROGRESS NOTE
Assessment/Plan


Problems:  


(1) HIV (human immunodeficiency virus infection)


ICD Codes:  Z21 - Asymptomatic human immunodeficiency virus [HIV] infection 

status


SNOMED:  82656457


(2) GI bleeding


(3) Abdominal pain of unknown etiology


ICD Codes:  R10.9 - Unspecified abdominal pain


SNOMED:  385849734


(4) Intractable vomiting


ICD Codes:  R11.10 - Vomiting, unspecified


SNOMED:  560750508


(5) Gastritis


ICD Codes:  K29.70 - Gastritis


SNOMED:  5923246


Status:  stable, unchanged


Status Narrative


Discussed with Dr. Cleveland.


Assessment/Plan


iron panel WNL


stable H&H


elevated lipase


HIV +


coffee grounds per patient





hold EGD at this time


adv to low fat diet


ordered OB stool


ordered utox


ppi


fu labs





Subjective


Gastrointestinal/Abdominal:  Reports: abdominal pain - hx of ulcer





Objective





Last 24 Hour Vital Signs








  Date Time  Temp Pulse Resp B/P Pulse Ox O2 Delivery O2 Flow Rate FiO2


 


1/23/17 12:24 97.2 70 20 145/85 99 Room Air  


 


1/23/17 08:58 97.7 79  114/76 97 Room Air  


 


1/23/17 04:00 97.3 84 22 136/75 95 Room Air  


 


1/23/17 00:00 97.9 87 20 125/68 97 Room Air  


 


1/22/17 20:27 97.1       


 


1/22/17 20:00 98.4 74 22 139/87 99 Room Air  


 


1/22/17 16:02 97.1 80 20 135/82 100   

















Intake and Output  


 


 1/22/17 1/23/17





 19:00 07:00


 


Intake Total 855 ml 740 ml


 


Balance 855 ml 740 ml


 


  


 


Intake Oral 240 ml 240 ml


 


IV Total 615 ml 500 ml


 


# Voids 5 3


 


# Bowel Movements 1 1











Laboratory Tests








Test


  1/22/17


21:25


 


White Blood Count


  5.1 K/UL


(4.8-10.8)


 


Red Blood Count


  4.19 M/UL


(4.70-6.10)  L


 


Hemoglobin


  11.2 G/DL


(14.2-18.0)  L


 


Hematocrit


  36.9 %


(42.0-52.0)  L


 


Mean Corpuscular Volume 88 FL (80-99)  


 


Mean Corpuscular Hemoglobin


  26.8 PG


(27.0-31.0)  L


 


Mean Corpuscular Hemoglobin


Concent 30.4 G/DL


(32.0-36.0)  L


 


Red Cell Distribution Width


  14.6 %


(11.6-14.8)


 


Platelet Count


  154 K/UL


(150-450)


 


Mean Platelet Volume


  7.0 FL


(6.5-10.1)


 


Neutrophils (%) (Auto)


  33.7 %


(45.0-75.0)  L


 


Lymphocytes (%) (Auto)


  49.6 %


(20.0-45.0)  H


 


Monocytes (%) (Auto)


  11.4 %


(1.0-10.0)  H


 


Eosinophils (%) (Auto)


  3.8 %


(0.0-3.0)  H


 


Basophils (%) (Auto)


  1.5 %


(0.0-2.0)


 


Prothrombin Time


  13.2 SEC


(9.30-11.50)  H


 


Prothromb Time International


Ratio 1.3 (0.9-1.1)


H








Height (Feet):  6


Height (Inches):  1.00


Weight (Pounds):  239


General Appearance:  no apparent distress, lethargic


Cardiovascular:  normal rate


Respiratory/Chest:  normal breath sounds, no respiratory distress


Abdominal Exam:  normal bowel sounds, non tender, soft


Extremities:  normal range of motion











Yashira Locke N.P. Jan 23, 2017 12:55

## 2017-01-23 NOTE — PULMONOLOGY PROGRESS NOTE
Assessment/Plan


Problems:  


(1) UTI (urinary tract infection)


(2) UGIB (upper gastrointestinal bleed)


(3) Gastroparesis


(4) Chronic pancreatitis


(5) HTN (hypertension)


(6) Narcotic dependence


(7) Diabetes


(8) HIV (human immunodeficiency virus infection)


Assessment/Plan


IV antibiotics for UTI


advance diet


check h/h


check INR


dc plannign soon





Subjective


ROS Limited/Unobtainable:  No


Interval Events:  no more episodes of bleeding


Allergies:  


Coded Allergies:  


     IODINATED CONTRAST MEDIA - IV DYE (Verified  Allergy, Severe, Shortness of 

Breath, 9/16/15)


     DORIPENEM (Verified  Allergy, Intermediate, Itching, 9/16/15)


     KETOROLAC TROMETHAMINE (Verified  Allergy, Intermediate, Hives, 9/16/15)


     TRAMADOL (Verified  Allergy, Intermediate, Hives, 9/16/15)


     ASPIRIN (Verified  Allergy, Mild, 12/7/10)





Objective





Last 24 Hour Vital Signs








  Date Time  Temp Pulse Resp B/P Pulse Ox O2 Delivery O2 Flow Rate FiO2


 


1/23/17 20:00 97.5 80 20 123/87 100 Room Air  


 


1/23/17 16:00 97.7 75 22 152/113 100 Room Air  


 


1/23/17 13:05    145/85    


 


1/23/17 12:24 97.2 70 20 145/85 99 Room Air  


 


1/23/17 08:58 97.7 79  114/76 97 Room Air  


 


1/23/17 04:00 97.3 84 22 136/75 95 Room Air  


 


1/23/17 00:00 97.9 87 20 125/68 97 Room Air  

















Intake and Output  


 


 1/22/17 1/23/17





 19:00 07:00


 


Intake Total 855 ml 740 ml


 


Balance 855 ml 740 ml


 


  


 


Intake Oral 240 ml 240 ml


 


IV Total 615 ml 500 ml


 


# Voids 5 3


 


# Bowel Movements 1 1








General Appearance:  WD/WN


HEENT:  normocephalic


Respiratory/Chest:  chest wall non-tender


Cardiovascular:  normal peripheral pulses


Abdomen:  normal bowel sounds


Extremities:  no cyanosis


Neurologic/Psychiatric:  CNs II-XII grossly normal


Laboratory Tests


1/23/17 20:00: 


White Blood Count 4.6L, Red Blood Count 4.31L, Hemoglobin 11.6L, Hematocrit 

38.0L, Mean Corpuscular Volume 88, Mean Corpuscular Hemoglobin 26.8L, Mean 

Corpuscular Hemoglobin Concent 30.5L, Red Cell Distribution Width 14.8, 

Platelet Count 156, Mean Platelet Volume 8.3, Neutrophils (%) (Auto) 40.9L, 

Lymphocytes (%) (Auto) 43.4, Monocytes (%) (Auto) 10.5H, Eosinophils (%) (Auto) 

3.5H, Basophils (%) (Auto) 1.7, Sodium Level 137, Potassium Level 3.9, Chloride 

Level 98, Carbon Dioxide Level 23, Anion Gap 16H, Blood Urea Nitrogen 16, 

Creatinine 1.8H, Estimat Glomerular Filtration Rate 48.2, Glucose Level 157H, 

Calcium Level 8.6


1/23/17 21:15: 


Urine Opiates Screen [Pending], Urine Barbiturates Screen [Pending], 

Phencyclidine (PCP) Screen [Pending], Urine Amphetamines Screen [Pending], 

Urine Benzodiazepines Screen [Pending], Urine Cocaine Screen [Pending], Urine 

Marijuana (THC) Screen [Pending]





Current Medications








 Medications


  (Trade)  Dose


 Ordered  Sig/Kirsten


 Route


 PRN Reason  Start Time


 Stop Time Status Last Admin


Dose Admin


 


 Acetaminophen


  (Tylenol)  650 mg  Q4H  PRN


 ORAL


 fever  1/20/17 23:30


 2/19/17 23:29   


 


 


 Al Hydroxide/Mg


 Hydroxide


  (Mylanta II)  30 ml  Q6H  PRN


 ORAL


 dyspepsia  1/20/17 23:30


 2/19/17 23:29   


 


 


 Ceftriaxone


 Sodium/Dextrose


  (Rocephin/D5W)  55 ml @ 


 110 mls/hr  Q24H


 IVPB


   1/21/17 11:00


 1/28/17 10:59  1/23/17 12:17


 


 


 Dextrose     STAT  PRN


 IV


 Hypoglycemia  1/20/17 23:30


 2/19/17 23:29   


 


 


 Dextrose/Sodium


 Chloride


  (D5 0.45% NS)  1,000 ml @ 


 50 mls/hr  Q20H


 IV


   1/21/17 07:00


 2/20/17 06:59  1/23/17 19:08


 


 


 Diphenhydramine


 HCl


  (Benadryl)  25 mg  Q6H  PRN


 IVP


 Itching  1/21/17 10:15


 2/20/17 10:14  1/23/17 19:01


 


 


 Enalapril Maleate


  (Vasotec)  10 mg  DAILY


 ORAL


   1/21/17 09:00


 2/20/17 08:59  1/23/17 13:05


 


 


 Morphine Sulfate


 4 mg  4 mg  Q4H  PRN


 IVP


 For Pain (Moderate to Severe)  1/21/17 10:45


 1/28/17 10:44  1/23/17 19:02


 


 


 Nitroglycerin


  (Ntg)  0.4 mg  Q5M X 3 DOSES PRN


 SL


 Prn Chest Pain  1/20/17 23:30


 2/19/17 23:29   


 


 


 Ondansetron HCl


  (Zofran)  4 mg  Q6H  PRN


 IVP


 Nausea & Vomiting  1/20/17 23:30


 2/19/17 23:29  1/23/17 19:01


 


 


 Pantoprazole


  (Protonix)  40 mg  DAILY


 ORAL


   1/22/17 09:00


 2/21/17 08:59  1/23/17 13:06


 


 


 Polyethylene


 Glycol


  (Miralax)  17 gm  HSPRN  PRN


 ORAL


 Constipation  1/20/17 23:30


 2/19/17 23:29   


 


 


 Temazepam


  (Restoril)  15 mg  HSPRN  PRN


 ORAL


 Insomnia  1/20/17 23:30


 1/27/17 23:29   


 

















CHAVO DUNBAR Jan 23, 2017 23:04

## 2017-01-23 NOTE — NEPHROLOGY PROGRESS NOTE
Assessment/Plan


Problem List:  


(1) Nausea, vomiting, and diarrhea


(2) Abdominal pain


(3) Hypernatremia


(4) UTI (urinary tract infection)


(5) HIV (human immunodeficiency virus infection)


(6) Hematuria


(7) Hypokalemia


Plan


Continue IVF


Continue pain management


GI f/u


Coumadin per hematology


Urology consult rec


Monitor lytes


Abx per ID


Monitor intake and output


AM labs





Subjective


Constitutional:  Denies: chills, diaphoresis, fever, malaise, no symptoms, other

, weakness


HEENT:  Denies: blurred vision, double vision, ear discharge, ear pain, eye pain

, mouth pain, mouth swelling, no symptoms, nose congestion, nose pain, other, 

tearing, throat pain, throat swelling


Genitourinary:  Reports: hematuria, pain


Neurologic/Psychiatric:  Denies: anxiety, depressed, emotional problems, 

headache, no symptoms, numbness, other, paresthesia, pre-existing deficit, 

seizure, tingling, tremors, weakness


Subjective


In bed, in no distress





Objective


Objective





Last 24 Hour Vital Signs








  Date Time  Temp Pulse Resp B/P Pulse Ox O2 Delivery O2 Flow Rate FiO2


 


1/23/17 13:05    145/85    


 


1/23/17 12:24 97.2 70 20 145/85 99 Room Air  


 


1/23/17 08:58 97.7 79  114/76 97 Room Air  


 


1/23/17 04:00 97.3 84 22 136/75 95 Room Air  


 


1/23/17 00:00 97.9 87 20 125/68 97 Room Air  


 


1/22/17 20:27 97.1       


 


1/22/17 20:00 98.4 74 22 139/87 99 Room Air  


 


1/22/17 16:02 97.1 80 20 135/82 100   

















Intake and Output  


 


 1/22/17 1/23/17





 19:00 07:00


 


Intake Total 855 ml 740 ml


 


Balance 855 ml 740 ml


 


  


 


Intake Oral 240 ml 240 ml


 


IV Total 615 ml 500 ml


 


# Voids 5 3


 


# Bowel Movements 1 1








Laboratory Tests


1/22/17 21:25: 


White Blood Count 5.1, Red Blood Count 4.19L, Hemoglobin 11.2L, Hematocrit 36.9L

, Mean Corpuscular Volume 88, Mean Corpuscular Hemoglobin 26.8L, Mean 

Corpuscular Hemoglobin Concent 30.4L, Red Cell Distribution Width 14.6, 

Platelet Count 154, Mean Platelet Volume 7.0, Neutrophils (%) (Auto) 33.7L, 

Lymphocytes (%) (Auto) 49.6H, Monocytes (%) (Auto) 11.4H, Eosinophils (%) (Auto

) 3.8H, Basophils (%) (Auto) 1.5, Prothrombin Time 13.2H, Prothromb Time 

International Ratio 1.3H


Height (Feet):  6


Height (Inches):  1.00


Weight (Pounds):  239


General Appearance:  no apparent distress, alert


EENT:  normal ENT inspection, TMs normal


Neck:  normal alignment, supple, normal inspection


Cardiovascular:  normal rate, regular rhythm, no JVD


Respiratory/Chest:  lungs clear, normal breath sounds, no respiratory distress


Abdomen:  tender


Extremities:  normal range of motion, non-tender, normal inspection, no calf 

tenderness


Neurologic:  alert, oriented x 3, responsive, normal mood/affect











Berenice Carroll N.P. Jan 23, 2017 14:46

## 2017-01-23 NOTE — GENERAL PROGRESS NOTE
Assessment/Plan


Problem List:  


(1) Renal insufficiency


ICD Codes:  N28.9 - Disorder of kidney and ureter, unspecified


SNOMED:  163787408


(2) Intractable vomiting


ICD Codes:  R11.10 - Vomiting, unspecified


SNOMED:  529114181


(3) Abdominal pain of unknown etiology


ICD Codes:  R10.9 - Unspecified abdominal pain


SNOMED:  425136128


(4) UTI (urinary tract infection)


ICD Codes:  N39.0 - Urinary tract infection, site not specified


SNOMED:  34778502


(5) Intractable abdominal pain


ICD Codes:  R10.9 - Intractable abdominal pain


SNOMED:  53496023


(6) Hypokalemia


ICD Codes:  E87.6 - Hypokalemia


SNOMED:  44011185


(7) GI bleeding


(8) Anemia


ICD Codes:  D64.9 - Anemia, unspecified


SNOMED:  801709859


Status:  stable, progressing, tolerating diet


Assessment/Plan


ot pt diet pain control gi f/u egd cbc bmp am





Subjective


Constitutional:  Reports: weakness


Allergies:  


Coded Allergies:  


     IODINATED CONTRAST MEDIA - IV DYE (Verified  Allergy, Severe, Shortness of 

Breath, 9/16/15)


     DORIPENEM (Verified  Allergy, Intermediate, Itching, 9/16/15)


     KETOROLAC TROMETHAMINE (Verified  Allergy, Intermediate, Hives, 9/16/15)


     TRAMADOL (Verified  Allergy, Intermediate, Hives, 9/16/15)


     ASPIRIN (Verified  Allergy, Mild, 12/7/10)


All Systems:  reviewed and negative except above


Subjective


sleepy calm sl nausea / gen pain





Objective





Last 24 Hour Vital Signs








  Date Time  Temp Pulse Resp B/P Pulse Ox O2 Delivery O2 Flow Rate FiO2


 


1/23/17 13:05    145/85    


 


1/23/17 12:24 97.2 70 20 145/85 99 Room Air  


 


1/23/17 08:58 97.7 79  114/76 97 Room Air  


 


1/23/17 04:00 97.3 84 22 136/75 95 Room Air  


 


1/23/17 00:00 97.9 87 20 125/68 97 Room Air  


 


1/22/17 20:27 97.1       


 


1/22/17 20:00 98.4 74 22 139/87 99 Room Air  


 


1/22/17 16:02 97.1 80 20 135/82 100   

















Intake and Output  


 


 1/22/17 1/23/17





 19:00 07:00


 


Intake Total 855 ml 740 ml


 


Balance 855 ml 740 ml


 


  


 


Intake Oral 240 ml 240 ml


 


IV Total 615 ml 500 ml


 


# Voids 5 3


 


# Bowel Movements 1 1








Laboratory Tests


1/22/17 21:25: 


White Blood Count 5.1, Red Blood Count 4.19L, Hemoglobin 11.2L, Hematocrit 36.9L

, Mean Corpuscular Volume 88, Mean Corpuscular Hemoglobin 26.8L, Mean 

Corpuscular Hemoglobin Concent 30.4L, Red Cell Distribution Width 14.6, 

Platelet Count 154, Mean Platelet Volume 7.0, Neutrophils (%) (Auto) 33.7L, 

Lymphocytes (%) (Auto) 49.6H, Monocytes (%) (Auto) 11.4H, Eosinophils (%) (Auto

) 3.8H, Basophils (%) (Auto) 1.5, Prothrombin Time 13.2H, Prothromb Time 

International Ratio 1.3H


Height (Feet):  6


Height (Inches):  1.00


Weight (Pounds):  239


General Appearance:  alert


EENT:  normal ENT inspection


Neck:  normal alignment


Cardiovascular:  normal peripheral pulses, normal rate, regular rhythm


Respiratory/Chest:  chest wall non-tender, lungs clear, normal breath sounds


Abdomen:  normal bowel sounds, non tender, soft


Extremities:  normal inspection


Edema:  no edema noted Arm (L), no edema noted Arm (R), no edema noted Leg (L), 

no edema noted Leg (R), no edema noted Pedal (L), no edema noted Pedal (R), no 

edema noted Generalized


Neurologic:  responsive, motor weakness


Skin:  normal pigmentation, warm/dry











TUCKER GUILLERMO Jan 23, 2017 13:39

## 2017-01-23 NOTE — GENERAL PROGRESS NOTE
Assessment/Plan


Assessment/Plan


IMPRESSION:  


1. Anemia 2/2 GI bleed - currently h/h stable, being evaluated by GI service


2. Anemia of chronic disease.


3. Anemia of kidney disease.


4. Chronic kidney disease.


5. Coagulopathy, secondary to coumadin.


6. Hypercoagulable state.


7. History of deep vein thrombosis s/p ivc filter


8. History of pulmonary embolism s/p ivc filter


9. Status post IVC filter placement.


10. Anticoagulation with Coumadin.


11. Drug-seeking behavior.


 


RECOMMENDATIONS:  


1. Monitor counts


2. Watch coagulopathy


3. Coumadin to continue


4. INR 2-3 goal


5. pRBC transfusion as needed basis, goal >7


6. Followup on ID, GI, pulm recs


7. Skin care


8.  Staff





Sincerely,





Jb Parmar MD





Subjective


Constitutional:  Reports: no symptoms


HEENT:  Reports: no symptoms


Cardiovascular:  Reports: no symptoms


Respiratory:  Reports: no symptoms


Gastrointestinal/Abdominal:  Reports: poor appetite


Genitourinary:  Reports: no symptoms


Neurologic/Psychiatric:  Reports: no symptoms


Endocrine:  Reports: no symptoms


Hematologic/Lymphatic:  Reports: anemia


Allergies:  


Coded Allergies:  


     IODINATED CONTRAST MEDIA - IV DYE (Verified  Allergy, Severe, Shortness of 

Breath, 9/16/15)


     DORIPENEM (Verified  Allergy, Intermediate, Itching, 9/16/15)


     KETOROLAC TROMETHAMINE (Verified  Allergy, Intermediate, Hives, 9/16/15)


     TRAMADOL (Verified  Allergy, Intermediate, Hives, 9/16/15)


     ASPIRIN (Verified  Allergy, Mild, 12/7/10)


Subjective


not bleeding, h/h stable





Objective





Last 24 Hour Vital Signs








  Date Time  Temp Pulse Resp B/P Pulse Ox O2 Delivery O2 Flow Rate FiO2


 


1/23/17 13:05    145/85    


 


1/23/17 12:24 97.2 70 20 145/85 99 Room Air  


 


1/23/17 08:58 97.7 79  114/76 97 Room Air  


 


1/23/17 04:00 97.3 84 22 136/75 95 Room Air  


 


1/23/17 00:00 97.9 87 20 125/68 97 Room Air  


 


1/22/17 20:27 97.1       


 


1/22/17 20:00 98.4 74 22 139/87 99 Room Air  


 


1/22/17 16:02 97.1 80 20 135/82 100   

















Intake and Output  


 


 1/22/17 1/23/17





 19:00 07:00


 


Intake Total 855 ml 740 ml


 


Balance 855 ml 740 ml


 


  


 


Intake Oral 240 ml 240 ml


 


IV Total 615 ml 500 ml


 


# Voids 5 3


 


# Bowel Movements 1 1








Laboratory Tests


1/22/17 21:25: 


White Blood Count 5.1, Red Blood Count 4.19L, Hemoglobin 11.2L, Hematocrit 36.9L

, Mean Corpuscular Volume 88, Mean Corpuscular Hemoglobin 26.8L, Mean 

Corpuscular Hemoglobin Concent 30.4L, Red Cell Distribution Width 14.6, 

Platelet Count 154, Mean Platelet Volume 7.0, Neutrophils (%) (Auto) 33.7L, 

Lymphocytes (%) (Auto) 49.6H, Monocytes (%) (Auto) 11.4H, Eosinophils (%) (Auto

) 3.8H, Basophils (%) (Auto) 1.5, Prothrombin Time 13.2H, Prothromb Time 

International Ratio 1.3H


Height (Feet):  6


Height (Inches):  1.00


Weight (Pounds):  239


General Appearance:  no apparent distress


EENT:  TMs normal


Neck:  supple


Cardiovascular:  regular rhythm


Respiratory/Chest:  normal breath sounds


Abdomen:  non tender


Extremities:  non-tender


Edema:  1+ Leg (L), 1+ Leg (R)


Edema:  mild edema


Neurologic:  alert


Skin:  warm/dry











Jb Parmar Jan 23, 2017 15:50

## 2017-01-24 NOTE — GENERAL PROGRESS NOTE
Assessment/Plan


Problem List:  


(1) Renal insufficiency


ICD Codes:  N28.9 - Disorder of kidney and ureter, unspecified


SNOMED:  791862068


(2) Intractable vomiting


ICD Codes:  R11.10 - Vomiting, unspecified


SNOMED:  528847122


(3) Abdominal pain of unknown etiology


ICD Codes:  R10.9 - Unspecified abdominal pain


SNOMED:  132963802


(4) UTI (urinary tract infection)


ICD Codes:  N39.0 - Urinary tract infection, site not specified


SNOMED:  27018955


(5) Intractable abdominal pain


ICD Codes:  R10.9 - Intractable abdominal pain


SNOMED:  49401759


(6) Hypokalemia


ICD Codes:  E87.6 - Hypokalemia


SNOMED:  55524990


(7) GI bleeding


(8) Anemia


ICD Codes:  D64.9 - Anemia, unspecified


SNOMED:  379154909


Status:  stable, progressing


Assessment/Plan


ot pt diet pain control gi f/u egd uro eval cbc bmp am





Subjective


Constitutional:  Reports: weakness


Allergies:  


Coded Allergies:  


     IODINATED CONTRAST MEDIA - IV DYE (Verified  Allergy, Severe, Shortness of 

Breath, 9/16/15)


     DORIPENEM (Verified  Allergy, Intermediate, Itching, 9/16/15)


     KETOROLAC TROMETHAMINE (Verified  Allergy, Intermediate, Hives, 9/16/15)


     TRAMADOL (Verified  Allergy, Intermediate, Hives, 9/16/15)


     ASPIRIN (Verified  Allergy, Mild, 12/7/10)


All Systems:  reviewed and negative except above


Subjective


sleepy calm sl nausea / gen pain





Objective





Last 24 Hour Vital Signs








  Date Time  Temp Pulse Resp B/P Pulse Ox O2 Delivery O2 Flow Rate FiO2


 


1/24/17 11:41 97.0 65 18 107/53 97 Room Air  


 


1/24/17 08:51    127/74    


 


1/24/17 08:14 97.0 81 18 130/83 97 Room Air  


 


1/24/17 04:00 97.3 81 18 134/73 95 Room Air  


 


1/24/17 00:00 97.2 82 18 123/75 95 Room Air  


 


1/23/17 20:00 97.5 80 20 123/87 100 Room Air  


 


1/23/17 16:00 97.7 75 22 152/113 100 Room Air  

















Intake and Output  


 


 1/23/17 1/24/17





 19:00 07:00


 


Intake Total 525 ml 1455 ml


 


Balance 525 ml 1455 ml


 


  


 


Intake Oral  855 ml


 


IV Total 525 ml 600 ml


 


# Voids 7 9


 


# Bowel Movements 2 1








Laboratory Tests


1/23/17 20:00: 


White Blood Count 4.6L, Red Blood Count 4.31L, Hemoglobin 11.6L, Hematocrit 

38.0L, Mean Corpuscular Volume 88, Mean Corpuscular Hemoglobin 26.8L, Mean 

Corpuscular Hemoglobin Concent 30.5L, Red Cell Distribution Width 14.8, 

Platelet Count 156, Mean Platelet Volume 8.3, Neutrophils (%) (Auto) 40.9L, 

Lymphocytes (%) (Auto) 43.4, Monocytes (%) (Auto) 10.5H, Eosinophils (%) (Auto) 

3.5H, Basophils (%) (Auto) 1.7, Sodium Level 137, Potassium Level 3.9, Chloride 

Level 98, Carbon Dioxide Level 23, Anion Gap 16H, Blood Urea Nitrogen 16, 

Creatinine 1.8H, Estimat Glomerular Filtration Rate 48.2, Glucose Level 157H, 

Calcium Level 8.6


1/23/17 21:15: 


Urine Opiates Screen PositiveH, Urine Barbiturates Screen Negative, 

Phencyclidine (PCP) Screen Negative, Urine Amphetamines Screen Negative, Urine 

Benzodiazepines Screen Negative, Urine Cocaine Screen Negative, Urine Marijuana 

(THC) Screen Negative


Height (Feet):  6


Height (Inches):  1.00


Weight (Pounds):  239


General Appearance:  lethargic


EENT:  normal ENT inspection


Neck:  normal alignment


Cardiovascular:  normal peripheral pulses, normal rate, regular rhythm


Respiratory/Chest:  chest wall non-tender, lungs clear, normal breath sounds


Abdomen:  normal bowel sounds, non tender, soft


Extremities:  normal inspection


Edema:  no edema noted Arm (L), no edema noted Arm (R), no edema noted Leg (L), 

no edema noted Leg (R), no edema noted Pedal (L), no edema noted Pedal (R), no 

edema noted Generalized


Neurologic:  responsive, motor weakness


Skin:  normal pigmentation, warm/dry











TUCKER GUILLERMO Jan 24, 2017 15:10

## 2017-01-24 NOTE — GENERAL PROGRESS NOTE
Assessment/Plan


Assessment/Plan


IMPRESSION:  


1. Anemia 2/2 GI bleed - currently h/h stable, egd deferred for now given 

stable h/h


2. Anemia of chronic disease.


3. Anemia of kidney disease.


4. Chronic kidney disease.


5. Coagulopathy, secondary to coumadin.


6. Hypercoagulable state.


7. History of deep vein thrombosis s/p ivc filter


8. History of pulmonary embolism s/p ivc filter


9. Status post IVC filter placement.


10. Anticoagulation with Coumadin.


11. Drug-seeking behavior.


 


RECOMMENDATIONS:  


1. Monitor counts


2. Watch coagulopathy


3. Coumadin to continue


4. INR 2-3 goal


5. pRBC transfusion as needed basis, goal >7


6. Followup on ID, GI, pulm recs


7. Skin care


8.  Staff





Sincerely,





Jb Parmar MD





Subjective


Constitutional:  Reports: no symptoms


HEENT:  Reports: no symptoms


Cardiovascular:  Reports: no symptoms


Respiratory:  Reports: no symptoms


Gastrointestinal/Abdominal:  Reports: no symptoms


Genitourinary:  Reports: no symptoms


Neurologic/Psychiatric:  Reports: no symptoms


Endocrine:  Reports: no symptoms


Hematologic/Lymphatic:  Reports: anemia


Allergies:  


Coded Allergies:  


     IODINATED CONTRAST MEDIA - IV DYE (Verified  Allergy, Severe, Shortness of 

Breath, 9/16/15)


     DORIPENEM (Verified  Allergy, Intermediate, Itching, 9/16/15)


     KETOROLAC TROMETHAMINE (Verified  Allergy, Intermediate, Hives, 9/16/15)


     TRAMADOL (Verified  Allergy, Intermediate, Hives, 9/16/15)


     ASPIRIN (Verified  Allergy, Mild, 12/7/10)


Subjective


not bleeding, h/h is stable





Objective





Last 24 Hour Vital Signs








  Date Time  Temp Pulse Resp B/P Pulse Ox O2 Delivery O2 Flow Rate FiO2


 


1/24/17 16:08 97.7 84 18 133/94 99 Room Air  


 


1/24/17 11:41 97.0 65 18 107/53 97 Room Air  


 


1/24/17 08:51    127/74    


 


1/24/17 08:14 97.0 81 18 130/83 97 Room Air  


 


1/24/17 04:00 97.3 81 18 134/73 95 Room Air  


 


1/24/17 00:00 97.2 82 18 123/75 95 Room Air  


 


1/23/17 20:00 97.5 80 20 123/87 100 Room Air  

















Intake and Output  


 


 1/23/17 1/24/17





 19:00 07:00


 


Intake Total 525 ml 1455 ml


 


Balance 525 ml 1455 ml


 


  


 


Intake Oral  855 ml


 


IV Total 525 ml 600 ml


 


# Voids 7 9


 


# Bowel Movements 2 1








Laboratory Tests


1/23/17 20:00: 


White Blood Count 4.6L, Red Blood Count 4.31L, Hemoglobin 11.6L, Hematocrit 

38.0L, Mean Corpuscular Volume 88, Mean Corpuscular Hemoglobin 26.8L, Mean 

Corpuscular Hemoglobin Concent 30.5L, Red Cell Distribution Width 14.8, 

Platelet Count 156, Mean Platelet Volume 8.3, Neutrophils (%) (Auto) 40.9L, 

Lymphocytes (%) (Auto) 43.4, Monocytes (%) (Auto) 10.5H, Eosinophils (%) (Auto) 

3.5H, Basophils (%) (Auto) 1.7, Sodium Level 137, Potassium Level 3.9, Chloride 

Level 98, Carbon Dioxide Level 23, Anion Gap 16H, Blood Urea Nitrogen 16, 

Creatinine 1.8H, Estimat Glomerular Filtration Rate 48.2, Glucose Level 157H, 

Calcium Level 8.6


1/23/17 21:15: 


Urine Opiates Screen PositiveH, Urine Barbiturates Screen Negative, 

Phencyclidine (PCP) Screen Negative, Urine Amphetamines Screen Negative, Urine 

Benzodiazepines Screen Negative, Urine Cocaine Screen Negative, Urine Marijuana 

(THC) Screen Negative


Height (Feet):  6


Height (Inches):  1.00


Weight (Pounds):  239


General Appearance:  alert


EENT:  TMs normal


Neck:  supple


Cardiovascular:  normal peripheral pulses


Respiratory/Chest:  lungs clear


Abdomen:  non tender


Extremities:  normal range of motion


Edema:  1+ Leg (L), 1+ Leg (R)


Edema:  mild edema


Skin:  normal pigmentation











Jb Parmar Jan 24, 2017 16:51

## 2017-01-24 NOTE — INFECTIOUS DISEASES PROG NOTE
Assessment/Plan


Problems:  


(1) HIV (human immunodeficiency virus infection)


Assessment & Plan:  with positive rapid screening test, will confirm, and order 

viral load, with CD4 level. patient denied any previous exposure to HIV. except 

multiple blood transfusion in the past





(2) UTI (urinary tract infection)


Assessment & Plan:  continue  ceftriaxon empirically and await urine culture





(3) Hypokalemia


Assessment & Plan:  improved, suspect dehydration related from vomiting, 

replace as needed, continue IVF for hydration





(4) Hypocalcemia


Assessment & Plan:  improved, most likely due to GI loss, replace as needed, 

monitor calcium level





(5) UGIB (upper gastrointestinal bleed)


Assessment & Plan:  may need EGD,  GI is following , monitor H&H, transfuse as 

needed





(6) Intractable abdominal pain


Assessment & Plan:  continue pain management as per primary ,  CT abdomen and 

pelvis showed nonobstructing stone .





(7) Hematuria


Assessment & Plan:  recommend urology eval and cystoscopy








Subjective


Genitourinary:  Reports: frequency, hematuria, other - pain


Allergies:  


Coded Allergies:  


     IODINATED CONTRAST MEDIA - IV DYE (Verified  Allergy, Severe, Shortness of 

Breath, 9/16/15)


     DORIPENEM (Verified  Allergy, Intermediate, Itching, 9/16/15)


     KETOROLAC TROMETHAMINE (Verified  Allergy, Intermediate, Hives, 9/16/15)


     TRAMADOL (Verified  Allergy, Intermediate, Hives, 9/16/15)


     ASPIRIN (Verified  Allergy, Mild, 12/7/10)


All Systems:  reviewed and negative except above





Objective


Vital Signs





Last 24 Hour Vital Signs








  Date Time  Temp Pulse Resp B/P Pulse Ox O2 Delivery O2 Flow Rate FiO2


 


1/24/17 16:08 97.7 84 18 133/94 99 Room Air  


 


1/24/17 11:41 97.0 65 18 107/53 97 Room Air  


 


1/24/17 08:51    127/74    


 


1/24/17 08:14 97.0 81 18 130/83 97 Room Air  


 


1/24/17 04:00 97.3 81 18 134/73 95 Room Air  


 


1/24/17 00:00 97.2 82 18 123/75 95 Room Air  


 


1/23/17 20:00 97.5 80 20 123/87 100 Room Air  








Height (Feet):  6


Height (Inches):  1.00


Weight (Pounds):  239


General Appearance:  WD/WN, no acute distress


HEENT:  normocephalic, atraumatic, anicteric, mucous membranes moist, PERRL


Respiratory/Chest:  chest wall non-tender, lungs clear, normal breath sounds, 

no respiratory distress, no accessory muscle use


Cardiovascular:  normal peripheral pulses, normal rate, regular rhythm, no 

gallop/murmur, no JVD


Abdomen:  normal bowel sounds, soft, non tender, no organomegaly, non distended

, no mass, no scars


Extremities:  no cyanosis, no clubbing


Skin:  no rash, no lesions, no ulcers





Laboratory Tests








Test


  1/23/17


20:00 1/23/17


21:15


 


White Blood Count


  4.6 K/UL


(4.8-10.8)  L 


 


 


Red Blood Count


  4.31 M/UL


(4.70-6.10)  L 


 


 


Hemoglobin


  11.6 G/DL


(14.2-18.0)  L 


 


 


Hematocrit


  38.0 %


(42.0-52.0)  L 


 


 


Mean Corpuscular Volume 88 FL (80-99)   


 


Mean Corpuscular Hemoglobin


  26.8 PG


(27.0-31.0)  L 


 


 


Mean Corpuscular Hemoglobin


Concent 30.5 G/DL


(32.0-36.0)  L 


 


 


Red Cell Distribution Width


  14.8 %


(11.6-14.8) 


 


 


Platelet Count


  156 K/UL


(150-450) 


 


 


Mean Platelet Volume


  8.3 FL


(6.5-10.1) 


 


 


Neutrophils (%) (Auto)


  40.9 %


(45.0-75.0)  L 


 


 


Lymphocytes (%) (Auto)


  43.4 %


(20.0-45.0) 


 


 


Monocytes (%) (Auto)


  10.5 %


(1.0-10.0)  H 


 


 


Eosinophils (%) (Auto)


  3.5 %


(0.0-3.0)  H 


 


 


Basophils (%) (Auto)


  1.7 %


(0.0-2.0) 


 


 


Sodium Level


  137 mEQ/L


(135-145) 


 


 


Potassium Level


  3.9 mEQ/L


(3.4-4.9) 


 


 


Chloride Level


  98 mEQ/L


() 


 


 


Carbon Dioxide Level


  23 mEQ/L


(20-30) 


 


 


Anion Gap 16 (5-15)  H 


 


Blood Urea Nitrogen


  16 mg/dL


(7-23) 


 


 


Creatinine


  1.8 mg/dL


(0.7-1.2)  H 


 


 


Estimat Glomerular Filtration


Rate 48.2 mL/min


(>60) 


 


 


Glucose Level


  157 mg/dL


()  H 


 


 


Calcium Level


  8.6 mg/dL


(8.6-10.2) 


 


 


Urine Opiates Screen


  


  Positive


(NEGATIVE)  H


 


Urine Barbiturates Screen


  


  Negative


(NEGATIVE)


 


Phencyclidine (PCP) Screen


  


  Negative


(NEGATIVE)


 


Urine Amphetamines Screen


  


  Negative


(NEGATIVE)


 


Urine Benzodiazepines Screen


  


  Negative


(NEGATIVE)


 


Urine Cocaine Screen


  


  Negative


(NEGATIVE)


 


Urine Marijuana (THC) Screen


  


  Negative


(NEGATIVE)











Current Medications








 Medications


  (Trade)  Dose


 Ordered  Sig/Kirsten


 Route


 PRN Reason  Start Time


 Stop Time Status Last Admin


Dose Admin


 


 Acetaminophen


  (Tylenol)  650 mg  Q4H  PRN


 ORAL


 fever  1/20/17 23:30


 2/19/17 23:29   


 


 


 Al Hydroxide/Mg


 Hydroxide


  (Mylanta II)  30 ml  Q6H  PRN


 ORAL


 dyspepsia  1/20/17 23:30


 2/19/17 23:29   


 


 


 Ceftriaxone


 Sodium/Dextrose


  (Rocephin/D5W)  55 ml @ 


 110 mls/hr  Q24H


 IVPB


   1/21/17 11:00


 1/28/17 10:59  1/24/17 11:46


 


 


 Dextrose     STAT  PRN


 IV


 Hypoglycemia  1/20/17 23:30


 2/19/17 23:29   


 


 


 Dextrose/Sodium


 Chloride


  (D5 0.45% NS)  1,000 ml @ 


 50 mls/hr  Q20H


 IV


   1/21/17 07:00


 2/20/17 06:59  1/24/17 15:58


 


 


 Diphenhydramine


 HCl


  (Benadryl)  25 mg  Q6H  PRN


 IVP


 Itching  1/21/17 10:15


 2/20/17 10:14  1/24/17 16:05


 


 


 Enalapril Maleate


  (Vasotec)  10 mg  DAILY


 ORAL


   1/21/17 09:00


 2/20/17 08:59  1/24/17 08:51


 


 


 Morphine Sulfate


 4 mg  4 mg  Q4H  PRN


 IVP


 For Pain (Moderate to Severe)  1/21/17 10:45


 1/28/17 10:44  1/24/17 16:06


 


 


 Nitroglycerin


  (Ntg)  0.4 mg  Q5M X 3 DOSES PRN


 SL


 Prn Chest Pain  1/20/17 23:30


 2/19/17 23:29   


 


 


 Ondansetron HCl


  (Zofran)  4 mg  Q6H  PRN


 IVP


 Nausea & Vomiting  1/20/17 23:30


 2/19/17 23:29  1/24/17 16:05


 


 


 Pantoprazole


  (Protonix)  40 mg  DAILY


 ORAL


   1/22/17 09:00


 2/21/17 08:59  1/24/17 08:52


 


 


 Polyethylene


 Glycol


  (Miralax)  17 gm  HSPRN  PRN


 ORAL


 Constipation  1/20/17 23:30


 2/19/17 23:29   


 


 


 Temazepam


  (Restoril)  15 mg  HSPRN  PRN


 ORAL


 Insomnia  1/20/17 23:30


 1/27/17 23:29   


 


 


 Warfarin Sodium


  (Coumadin per


 pharmacy)  1 ea  DAILY  PRN


 MISC


 Per rx protocol  1/24/17 17:00


 2/23/17 16:59 Eitan Shelley M.D. Jan 24, 2017 17:21

## 2017-01-24 NOTE — GI PROGRESS NOTE
Assessment/Plan


Problems:  


(1) HIV (human immunodeficiency virus infection)


ICD Codes:  Z21 - Asymptomatic human immunodeficiency virus [HIV] infection 

status


SNOMED:  08249075


(2) GI bleeding


(3) Abdominal pain of unknown etiology


ICD Codes:  R10.9 - Unspecified abdominal pain


SNOMED:  238415195


(4) Intractable vomiting


ICD Codes:  R11.10 - Vomiting, unspecified


SNOMED:  283013242


(5) Gastritis


ICD Codes:  K29.70 - Gastritis


SNOMED:  2604398


Status:  stable


Status Narrative


Discussed with Dr. Clveeland.


Assessment/Plan


iron panel WNL


stable H&H


elevated lipase


HIV +


coffee grounds per patient





okay for DC per GI standpoint


no labs today, refused


defer EGD at this time


low fat diet tolerating


ordered OB stool


utox >> unremarkable


ppi


fu labs





Subjective


Gastrointestinal/Abdominal:  Reports: abdominal pain, vomiting





Objective





Last 24 Hour Vital Signs








  Date Time  Temp Pulse Resp B/P Pulse Ox O2 Delivery O2 Flow Rate FiO2


 


1/24/17 11:41 97.0 65 18 107/53 97 Room Air  


 


1/24/17 08:51    127/74    


 


1/24/17 08:14 97.0 81 18 130/83 97 Room Air  


 


1/24/17 04:00 97.3 81 18 134/73 95 Room Air  


 


1/24/17 00:00 97.2 82 18 123/75 95 Room Air  


 


1/23/17 20:00 97.5 80 20 123/87 100 Room Air  


 


1/23/17 16:00 97.7 75 22 152/113 100 Room Air  

















Intake and Output  


 


 1/23/17 1/24/17





 19:00 07:00


 


Intake Total 525 ml 1455 ml


 


Balance 525 ml 1455 ml


 


  


 


Intake Oral  855 ml


 


IV Total 525 ml 600 ml


 


# Voids 7 9


 


# Bowel Movements 2 1











Laboratory Tests








Test


  1/23/17


20:00 1/23/17


21:15


 


White Blood Count


  4.6 K/UL


(4.8-10.8)  L 


 


 


Red Blood Count


  4.31 M/UL


(4.70-6.10)  L 


 


 


Hemoglobin


  11.6 G/DL


(14.2-18.0)  L 


 


 


Hematocrit


  38.0 %


(42.0-52.0)  L 


 


 


Mean Corpuscular Volume 88 FL (80-99)   


 


Mean Corpuscular Hemoglobin


  26.8 PG


(27.0-31.0)  L 


 


 


Mean Corpuscular Hemoglobin


Concent 30.5 G/DL


(32.0-36.0)  L 


 


 


Red Cell Distribution Width


  14.8 %


(11.6-14.8) 


 


 


Platelet Count


  156 K/UL


(150-450) 


 


 


Mean Platelet Volume


  8.3 FL


(6.5-10.1) 


 


 


Neutrophils (%) (Auto)


  40.9 %


(45.0-75.0)  L 


 


 


Lymphocytes (%) (Auto)


  43.4 %


(20.0-45.0) 


 


 


Monocytes (%) (Auto)


  10.5 %


(1.0-10.0)  H 


 


 


Eosinophils (%) (Auto)


  3.5 %


(0.0-3.0)  H 


 


 


Basophils (%) (Auto)


  1.7 %


(0.0-2.0) 


 


 


Sodium Level


  137 mEQ/L


(135-145) 


 


 


Potassium Level


  3.9 mEQ/L


(3.4-4.9) 


 


 


Chloride Level


  98 mEQ/L


() 


 


 


Carbon Dioxide Level


  23 mEQ/L


(20-30) 


 


 


Anion Gap 16 (5-15)  H 


 


Blood Urea Nitrogen


  16 mg/dL


(7-23) 


 


 


Creatinine


  1.8 mg/dL


(0.7-1.2)  H 


 


 


Estimat Glomerular Filtration


Rate 48.2 mL/min


(>60) 


 


 


Glucose Level


  157 mg/dL


()  H 


 


 


Calcium Level


  8.6 mg/dL


(8.6-10.2) 


 


 


Urine Opiates Screen


  


  Positive


(NEGATIVE)  H


 


Urine Barbiturates Screen


  


  Negative


(NEGATIVE)


 


Phencyclidine (PCP) Screen


  


  Negative


(NEGATIVE)


 


Urine Amphetamines Screen


  


  Negative


(NEGATIVE)


 


Urine Benzodiazepines Screen


  


  Negative


(NEGATIVE)


 


Urine Cocaine Screen


  


  Negative


(NEGATIVE)


 


Urine Marijuana (THC) Screen


  


  Negative


(NEGATIVE)








Height (Feet):  6


Height (Inches):  1.00


Weight (Pounds):  239


General Appearance:  no apparent distress, alert, overweight


Cardiovascular:  normal rate


Respiratory/Chest:  normal breath sounds, no respiratory distress


Abdominal Exam:  normal bowel sounds, non tender, soft


Extremities:  normal range of motion


Objective


no active s/sx of N/V


patient showed emesis >> clear and pinkish











Yashira Locke N.P. Jan 24, 2017 14:58

## 2017-01-24 NOTE — PULMONOLOGY PROGRESS NOTE
Assessment/Plan


Problems:  


(1) Bronchitis


(2) UTI (urinary tract infection)


(3) UGIB (upper gastrointestinal bleed)


(4) Gastroparesis


(5) Chronic pancreatitis


(6) HTN (hypertension)


(7) Narcotic dependence


(8) Diabetes


(9) HIV (human immunodeficiency virus infection)


Assessment/Plan


improving


respiratory treatment 


IV antibiotics for UTI


advance diet


check h/h


check INR


dc plannign soon





Subjective


ROS Limited/Unobtainable:  No


Interval Events:  no new complains


Allergies:  


Coded Allergies:  


     IODINATED CONTRAST MEDIA - IV DYE (Verified  Allergy, Severe, Shortness of 

Breath, 9/16/15)


     DORIPENEM (Verified  Allergy, Intermediate, Itching, 9/16/15)


     KETOROLAC TROMETHAMINE (Verified  Allergy, Intermediate, Hives, 9/16/15)


     TRAMADOL (Verified  Allergy, Intermediate, Hives, 9/16/15)


     ASPIRIN (Verified  Allergy, Mild, 12/7/10)





Objective





Last 24 Hour Vital Signs








  Date Time  Temp Pulse Resp B/P Pulse Ox O2 Delivery O2 Flow Rate FiO2


 


1/24/17 20:10 97.9 75 18 125/90 98 Room Air  


 


1/24/17 16:08 97.7 84 18 133/94 99 Room Air  


 


1/24/17 11:41 97.0 65 18 107/53 97 Room Air  


 


1/24/17 08:51    127/74    


 


1/24/17 08:14 97.0 81 18 130/83 97 Room Air  


 


1/24/17 04:00 97.3 81 18 134/73 95 Room Air  


 


1/24/17 00:00 97.2 82 18 123/75 95 Room Air  

















Intake and Output  


 


 1/23/17 1/24/17





 19:00 07:00


 


Intake Total 525 ml 1455 ml


 


Balance 525 ml 1455 ml


 


  


 


Intake Oral  855 ml


 


IV Total 525 ml 600 ml


 


# Voids 7 9


 


# Bowel Movements 2 1








General Appearance:  WD/WN


HEENT:  atraumatic


Respiratory/Chest:  chest wall non-tender, normal breath sounds


Cardiovascular:  normal peripheral pulses, regular rhythm


Abdomen:  normal bowel sounds, soft, non tender


Genitourinary:  normal external genitalia


Skin:  no rash, no ulcers





Current Medications








 Medications


  (Trade)  Dose


 Ordered  Sig/Kirsten


 Route


 PRN Reason  Start Time


 Stop Time Status Last Admin


Dose Admin


 


 Acetaminophen


  (Tylenol)  650 mg  Q4H  PRN


 ORAL


 fever  1/20/17 23:30


 2/19/17 23:29   


 


 


 Al Hydroxide/Mg


 Hydroxide


  (Mylanta II)  30 ml  Q6H  PRN


 ORAL


 dyspepsia  1/20/17 23:30


 2/19/17 23:29   


 


 


 Ceftriaxone


 Sodium/Dextrose


  (Rocephin/D5W)  55 ml @ 


 110 mls/hr  Q24H


 IVPB


   1/21/17 11:00


 1/28/17 10:59  1/24/17 11:46


 


 


 Dextrose     STAT  PRN


 IV


 Hypoglycemia  1/20/17 23:30


 2/19/17 23:29   


 


 


 Dextrose/Sodium


 Chloride


  (D5 0.45% NS)  1,000 ml @ 


 50 mls/hr  Q20H


 IV


   1/21/17 07:00


 2/20/17 06:59  1/24/17 15:58


 


 


 Diphenhydramine


 HCl


  (Benadryl)  25 mg  Q6H  PRN


 IVP


 Itching  1/21/17 10:15


 2/20/17 10:14  1/24/17 22:21


 


 


 Enalapril Maleate


  (Vasotec)  10 mg  DAILY


 ORAL


   1/21/17 09:00


 2/20/17 08:59  1/24/17 08:51


 


 


 Morphine Sulfate


 4 mg  4 mg  Q4H  PRN


 IVP


 For Pain (Moderate to Severe)  1/21/17 10:45


 1/28/17 10:44  1/24/17 22:21


 


 


 Nitroglycerin


  (Ntg)  0.4 mg  Q5M X 3 DOSES PRN


 SL


 Prn Chest Pain  1/20/17 23:30


 2/19/17 23:29   


 


 


 Ondansetron HCl


  (Zofran)  4 mg  Q6H  PRN


 IVP


 Nausea & Vomiting  1/20/17 23:30


 2/19/17 23:29  1/24/17 22:21


 


 


 Pantoprazole


  (Protonix)  40 mg  DAILY


 ORAL


   1/22/17 09:00


 2/21/17 08:59  1/24/17 08:52


 


 


 Polyethylene


 Glycol


  (Miralax)  17 gm  HSPRN  PRN


 ORAL


 Constipation  1/20/17 23:30


 2/19/17 23:29   


 


 


 Temazepam


  (Restoril)  15 mg  HSPRN  PRN


 ORAL


 Insomnia  1/20/17 23:30


 1/27/17 23:29   


 


 


 Warfarin Sodium


  (Coumadin per


 pharmacy)  1 ea  DAILY  PRN


 MISC


 Per rx protocol  1/24/17 17:00


 2/23/17 16:59 CHAVO RUTH Jan 24, 2017 23:51

## 2017-01-24 NOTE — NEPHROLOGY PROGRESS NOTE
Assessment/Plan


Problem List:  


(1) Renal insufficiency


(2) HIV (human immunodeficiency virus infection)


(3) Abdominal pain of unknown etiology


(4) Hypokalemia


(5) Nausea, vomiting, and diarrhea


(6) Abdominal pain


Plan


will cont to monitor. Cr stable.





Subjective


Subjective


no acute events,





Objective


Objective





Last 24 Hour Vital Signs








  Date Time  Temp Pulse Resp B/P Pulse Ox O2 Delivery O2 Flow Rate FiO2


 


1/24/17 20:10 97.9 75 18 125/90 98 Room Air  


 


1/24/17 16:08 97.7 84 18 133/94 99 Room Air  


 


1/24/17 11:41 97.0 65 18 107/53 97 Room Air  


 


1/24/17 08:51    127/74    


 


1/24/17 08:14 97.0 81 18 130/83 97 Room Air  


 


1/24/17 04:00 97.3 81 18 134/73 95 Room Air  


 


1/24/17 00:00 97.2 82 18 123/75 95 Room Air  

















Intake and Output  


 


 1/23/17 1/24/17





 19:00 07:00


 


Intake Total 525 ml 1455 ml


 


Balance 525 ml 1455 ml


 


  


 


Intake Oral  855 ml


 


IV Total 525 ml 600 ml


 


# Voids 7 9


 


# Bowel Movements 2 1








Height (Feet):  6


Height (Inches):  1.00


Weight (Pounds):  239


General Appearance:  no apparent distress


Cardiovascular:  normal rate, regular rhythm


Respiratory/Chest:  lungs clear


Abdomen:  non tender, soft











LIAM GARZA Jan 24, 2017 23:08

## 2017-01-25 NOTE — GENERAL PROGRESS NOTE
Assessment/Plan


Assessment/Plan


IMPRESSION:  


1. Anemia 2/2 GI bleed - currently h/h stable, egd deferred for now given 

stable h/h


2. Anemia of chronic disease.


3. Anemia of kidney disease.


4. Chronic kidney disease.


5. Coagulopathy, secondary to coumadin.


6. Hypercoagulable state.


7. History of deep vein thrombosis s/p ivc filter on coumadin


8. History of pulmonary embolism s/p ivc filter


9. Status post IVC filter placement.


10. Drug-seeking behavior.


 


RECOMMENDATIONS:  


1. Monitor counts


2. Watch coagulopathy


3. Coumadin to continue


4. INR 2-3 goal


5. pRBC transfusion as needed basis, goal >7


6. Followup on ID, GI, pulm recs


7. Skin care


8.  Staff





Sincerely,





Jb Parmar MD





Subjective


Constitutional:  Reports: no symptoms


HEENT:  Reports: no symptoms


Cardiovascular:  Reports: no symptoms


Respiratory:  Reports: no symptoms


Gastrointestinal/Abdominal:  Reports: poor appetite


Genitourinary:  Reports: no symptoms


Neurologic/Psychiatric:  Reports: no symptoms


Endocrine:  Reports: no symptoms


Hematologic/Lymphatic:  Reports: anemia


Allergies:  


Coded Allergies:  


     IODINATED CONTRAST MEDIA - IV DYE (Verified  Allergy, Severe, Shortness of 

Breath, 9/16/15)


     DORIPENEM (Verified  Allergy, Intermediate, Itching, 9/16/15)


     KETOROLAC TROMETHAMINE (Verified  Allergy, Intermediate, Hives, 9/16/15)


     TRAMADOL (Verified  Allergy, Intermediate, Hives, 9/16/15)


     ASPIRIN (Verified  Allergy, Mild, 12/7/10)


Subjective


without bleeding, h/h is stable





Objective





Last 24 Hour Vital Signs








  Date Time  Temp Pulse Resp B/P Pulse Ox O2 Delivery O2 Flow Rate FiO2


 


1/25/17 08:26    120/85    


 


1/25/17 08:24 97.8 86 18 120/85 97 Room Air  


 


1/25/17 07:03 97.3       


 


1/25/17 04:00 97.3 80 18 147/75 97 Room Air  


 


1/25/17 00:00 97.3 74 18 130/66 95 Room Air  


 


1/24/17 20:10 97.9 75 18 125/90 98 Room Air  


 


1/24/17 16:08 97.7 84 18 133/94 99 Room Air  


 


1/24/17 11:41 97.0 65 18 107/53 97 Room Air  

















Intake and Output  


 


 1/24/17 1/25/17





 19:00 07:00


 


Intake Total 1030 ml 1200 ml


 


Balance 1030 ml 1200 ml


 


  


 


Intake Oral 600 ml 1050 ml


 


IV Total 430 ml 150 ml


 


# Voids 4 9


 


# Bowel Movements  1








Height (Feet):  6


Height (Inches):  1.00


Weight (Pounds):  239


General Appearance:  no apparent distress


EENT:  TMs normal


Neck:  supple


Cardiovascular:  regular rhythm


Respiratory/Chest:  normal breath sounds


Abdomen:  non tender


Extremities:  non-tender


Edema:  no edema noted Leg (L), no edema noted Leg (R)


Edema:  mild edema


Neurologic:  alert


Skin:  warm/dry











Jb Parmar Jan 25, 2017 09:58

## 2017-01-25 NOTE — INFECTIOUS DISEASES PROG NOTE
Assessment/Plan


Problems:  


(1) HIV (human immunodeficiency virus infection)


Assessment & Plan:  with positive rapid screening test, and positive HIV-1 

antibody ,  viral load, with CD4 level are pending . will check RPR, GC&

Chlamydia , toxo IgG, and T spot test,  patient denied any previous exposure to 

HIV. except multiple blood transfusion in the past, recommend follow up with 

HIV provider as an out patient to start antiretroviral therapy.





(2) UTI (urinary tract infection)


Assessment & Plan:  continue  ceftriaxon empirically and await urine culture





(3) Hypokalemia


Assessment & Plan:  improved, suspect dehydration related from vomiting, 

replace as needed, continue IVF for hydration





(4) Hypocalcemia


Assessment & Plan:  improved, most likely due to GI loss, replace as needed, 

monitor calcium level





(5) UGIB (upper gastrointestinal bleed)


Assessment & Plan:  may need EGD,  GI is following , monitor H&H, transfuse as 

needed





(6) Intractable abdominal pain


Assessment & Plan:  continue pain management as per primary ,  CT abdomen and 

pelvis showed nonobstructing stone .





(7) Hematuria


Assessment & Plan:  recommend urology eval and cystoscopy








Subjective


Genitourinary:  Reports: dysuria, frequency, hematuria


Allergies:  


Coded Allergies:  


     IODINATED CONTRAST MEDIA - IV DYE (Verified  Allergy, Severe, Shortness of 

Breath, 9/16/15)


     DORIPENEM (Verified  Allergy, Intermediate, Itching, 9/16/15)


     KETOROLAC TROMETHAMINE (Verified  Allergy, Intermediate, Hives, 9/16/15)


     TRAMADOL (Verified  Allergy, Intermediate, Hives, 9/16/15)


     ASPIRIN (Verified  Allergy, Mild, 12/7/10)


All Systems:  reviewed and negative except above





Objective


Vital Signs





Last 24 Hour Vital Signs








  Date Time  Temp Pulse Resp B/P Pulse Ox O2 Delivery O2 Flow Rate FiO2


 


1/25/17 16:05 98.2 74 23 110/63 100 Room Air  


 


1/25/17 11:55 97.2 89 20 136/82 100 Room Air  


 


1/25/17 08:26    120/85    


 


1/25/17 08:24 97.8 86 18 120/85 97 Room Air  


 


1/25/17 07:03 97.3       


 


1/25/17 04:00 97.3 80 18 147/75 97 Room Air  


 


1/25/17 00:00 97.3 74 18 130/66 95 Room Air  


 


1/24/17 20:10 97.9 75 18 125/90 98 Room Air  








Height (Feet):  6


Height (Inches):  1.00


Weight (Pounds):  239


General Appearance:  WD/WN, no acute distress


HEENT:  normocephalic, atraumatic, anicteric, mucous membranes moist


Respiratory/Chest:  chest wall non-tender, lungs clear, normal breath sounds, 

no respiratory distress, no accessory muscle use


Cardiovascular:  normal peripheral pulses, normal rate, regular rhythm, no 

gallop/murmur


Abdomen:  normal bowel sounds, soft, non tender, no organomegaly, non distended

, no mass, no scars


Extremities:  no cyanosis, no clubbing


Skin:  no rash, no lesions





Microbiology








 Date/Time


Source Procedure


Growth Status


 


 


 1/24/17 16:00


Urine,Clean Catch Urine Culture - Preliminary


NO GROWTH Resulted











Laboratory Tests








Test


  1/25/17


16:50


 


White Blood Count


  5.6 K/UL


(4.8-10.8)


 


Red Blood Count


  4.64 M/UL


(4.70-6.10)  L


 


Hemoglobin


  12.6 G/DL


(14.2-18.0)  L


 


Hematocrit


  39.9 %


(42.0-52.0)  L


 


Mean Corpuscular Volume 86 FL (80-99)  


 


Mean Corpuscular Hemoglobin


  27.0 PG


(27.0-31.0)


 


Mean Corpuscular Hemoglobin


Concent 31.4 G/DL


(32.0-36.0)  L


 


Red Cell Distribution Width


  14.7 %


(11.6-14.8)


 


Platelet Count


  159 K/UL


(150-450)


 


Mean Platelet Volume


  7.3 FL


(6.5-10.1)


 


Neutrophils (%) (Auto)


  47.6 %


(45.0-75.0)


 


Lymphocytes (%) (Auto)


  37.9 %


(20.0-45.0)


 


Monocytes (%) (Auto)


  10.3 %


(1.0-10.0)  H


 


Eosinophils (%) (Auto)


  2.1 %


(0.0-3.0)


 


Basophils (%) (Auto)


  2.2 %


(0.0-2.0)  H


 


Prothrombin Time


  12.5 SEC


(9.30-11.50)  H


 


Prothromb Time International


Ratio 1.2 (0.9-1.1)


H


 


Sodium Level Pending  


 


Potassium Level Pending  


 


Chloride Level Pending  


 


Carbon Dioxide Level Pending  


 


Blood Urea Nitrogen Pending  


 


Creatinine Pending  


 


Estimat Glomerular Filtration


Rate Pending  


 


 


Glucose Level Pending  


 


Calcium Level Pending  


 


Amylase Level Pending  


 


Lipase Pending  











Current Medications








 Medications


  (Trade)  Dose


 Ordered  Sig/Kirsten


 Route


 PRN Reason  Start Time


 Stop Time Status Last Admin


Dose Admin


 


 Acetaminophen


  (Tylenol)  650 mg  Q4H  PRN


 ORAL


 fever  1/20/17 23:30


 2/19/17 23:29   


 


 


 Al Hydroxide/Mg


 Hydroxide


  (Mylanta II)  30 ml  Q6H  PRN


 ORAL


 dyspepsia  1/20/17 23:30


 2/19/17 23:29   


 


 


 Ceftriaxone


 Sodium/Dextrose


  (Rocephin/D5W)  55 ml @ 


 110 mls/hr  Q24H


 IVPB


   1/21/17 11:00


 1/28/17 10:59  1/25/17 11:45


 


 


 Dextrose     STAT  PRN


 IV


 Hypoglycemia  1/20/17 23:30


 2/19/17 23:29   


 


 


 Dextrose/Sodium


 Chloride


  (D5 0.45% NS)  1,000 ml @ 


 50 mls/hr  Q20H


 IV


   1/21/17 07:00


 2/20/17 06:59  1/25/17 13:31


 


 


 Diphenhydramine


 HCl


  (Benadryl)  25 mg  Q6H  PRN


 IVP


 Itching  1/21/17 10:15


 2/20/17 10:14  1/25/17 13:21


 


 


 Enalapril Maleate


  (Vasotec)  10 mg  DAILY


 ORAL


   1/21/17 09:00


 2/20/17 08:59  1/25/17 08:26


 


 


 Heparin Sodium/


 Sodium Chloride


  (Heparin 2000


 units/Ns 1000ml


 premix)  2,000 unit  ONCE  PRN


 INJ


 FOR PICC LINE PLACEMENT  1/25/17 09:30


 1/26/17 23:59   


 


 


 Lidocaine HCl


  (Xylocaine 1%


 30ml)  30 ml  ONCE  PRN


 INJ


 PICC PLACEMENT  1/25/17 09:30


 1/26/17 23:59   


 


 


 Morphine Sulfate


 4 mg  4 mg  Q4H  PRN


 IVP


 For Pain (Moderate to Severe)  1/21/17 10:45


 1/28/17 10:44  1/25/17 13:23


 


 


 Nitroglycerin


  (Ntg)  0.4 mg  Q5M X 3 DOSES PRN


 SL


 Prn Chest Pain  1/20/17 23:30


 2/19/17 23:29   


 


 


 Ondansetron HCl


  (Zofran)  4 mg  Q6H  PRN


 IVP


 Nausea & Vomiting  1/20/17 23:30


 2/19/17 23:29  1/25/17 13:12


 


 


 Pantoprazole


  (Protonix)  40 mg  DAILY


 ORAL


   1/22/17 09:00


 2/21/17 08:59  1/25/17 08:26


 


 


 Polyethylene


 Glycol


  (Miralax)  17 gm  HSPRN  PRN


 ORAL


 Constipation  1/20/17 23:30


 2/19/17 23:29   


 


 


 Sodium Bicarbonate


  (Sodium


 Bicarbonate)  50 ml  ONCE  PRN


 INJ


 FOR PICC LINE PLACEMENT  1/25/17 09:45


 1/26/17 23:59   


 


 


 Temazepam


  (Restoril)  15 mg  HSPRN  PRN


 ORAL


 Insomnia  1/20/17 23:30


 1/27/17 23:29   


 


 


 Warfarin Sodium


  (Coumadin per


 pharmacy)  1 ea  DAILY  PRN


 MISC


 Per rx protocol  1/24/17 17:00


 2/23/17 16:59 Eitan Shelley M.D. Jan 25, 2017 17:37

## 2017-01-25 NOTE — PULMONOLOGY PROGRESS NOTE
Assessment/Plan


Problems:  


(1) UTI (urinary tract infection)


(2) UGIB (upper gastrointestinal bleed)


(3) Gastroparesis


(4) Chronic pancreatitis


(5) HTN (hypertension)


(6) Narcotic dependence


(7) Diabetes


(8) HIV (human immunodeficiency virus infection)


Assessment/Plan


IV antibiotics for UTI


advance diet


check h/h


check INR


dc plannign soon





Subjective


ROS Limited/Unobtainable:  No


Genitourinary:  Reports: dysuria, frequency, hematuria, nocturia, urgency


Allergies:  


Coded Allergies:  


     IODINATED CONTRAST MEDIA - IV DYE (Verified  Allergy, Severe, Shortness of 

Breath, 9/16/15)


     DORIPENEM (Verified  Allergy, Intermediate, Itching, 9/16/15)


     KETOROLAC TROMETHAMINE (Verified  Allergy, Intermediate, Hives, 9/16/15)


     TRAMADOL (Verified  Allergy, Intermediate, Hives, 9/16/15)


     ASPIRIN (Verified  Allergy, Mild, 12/7/10)





Objective





Last 24 Hour Vital Signs








  Date Time  Temp Pulse Resp B/P Pulse Ox O2 Delivery O2 Flow Rate FiO2


 


1/25/17 20:05 97.7 74 20 151/87 98 Room Air  


 


1/25/17 16:05 98.2 74 23 110/63 100 Room Air  


 


1/25/17 11:55 97.2 89 20 136/82 100 Room Air  


 


1/25/17 08:26    120/85    


 


1/25/17 08:24 97.8 86 18 120/85 97 Room Air  


 


1/25/17 07:03 97.3       


 


1/25/17 04:00 97.3 80 18 147/75 97 Room Air  


 


1/25/17 00:00 97.3 74 18 130/66 95 Room Air  

















Intake and Output  


 


 1/24/17 1/25/17





 19:00 07:00


 


Intake Total 1030 ml 1200 ml


 


Balance 1030 ml 1200 ml


 


  


 


Intake Oral 600 ml 1050 ml


 


IV Total 430 ml 150 ml


 


# Voids 4 9


 


# Bowel Movements  1








General Appearance:  no acute distress


HEENT:  normocephalic, atraumatic, PERRL


Respiratory/Chest:  chest wall non-tender, decreased breath sounds, accessory 

muscle use


Cardiovascular:  normal peripheral pulses, normal rate, regular rhythm, no JVD


Abdomen:  normal bowel sounds, soft, non tender, no organomegaly


Genitourinary:  normal external genitalia


Extremities:  no cyanosis


Skin:  no rash, no lesions


Neurologic/Psychiatric:  CNs II-XII grossly normal, no motor/sensory deficits





Microbiology








 Date/Time


Source Procedure


Growth Status


 


 


 1/24/17 16:00


Urine,Clean Catch Urine Culture - Preliminary


NO GROWTH Resulted








Laboratory Tests


1/25/17 16:50: 


White Blood Count 5.6, Red Blood Count 4.64L, Hemoglobin 12.6L, Hematocrit 39.9L

, Mean Corpuscular Volume 86, Mean Corpuscular Hemoglobin 27.0, Mean 

Corpuscular Hemoglobin Concent 31.4L, Red Cell Distribution Width 14.7, 

Platelet Count 159, Mean Platelet Volume 7.3, Neutrophils (%) (Auto) 47.6, 

Lymphocytes (%) (Auto) 37.9, Monocytes (%) (Auto) 10.3H, Eosinophils (%) (Auto) 

2.1, Basophils (%) (Auto) 2.2H, Prothrombin Time 12.5H, Prothromb Time 

International Ratio 1.2H, Sodium Level 138, Potassium Level 4.5, Chloride Level 

99, Carbon Dioxide Level 26, Anion Gap 13, Blood Urea Nitrogen 17, Creatinine 

1.6H, Estimat Glomerular Filtration Rate 55.3, Glucose Level 117H, Calcium 

Level 9.1, Amylase Level 185H, Lipase 40


1/25/17 20:30: 


Rapid Plasma Reagin [Pending], Toxoplasma IgG Antibody [Pending], Toxoplasma 

IgM Antibody [Pending]





Current Medications








 Medications


  (Trade)  Dose


 Ordered  Sig/Kirsten


 Route


 PRN Reason  Start Time


 Stop Time Status Last Admin


Dose Admin


 


 Acetaminophen


  (Tylenol)  650 mg  Q4H  PRN


 ORAL


 fever  1/20/17 23:30


 2/19/17 23:29   


 


 


 Al Hydroxide/Mg


 Hydroxide


  (Mylanta II)  30 ml  Q6H  PRN


 ORAL


 dyspepsia  1/20/17 23:30


 2/19/17 23:29   


 


 


 Ceftriaxone


 Sodium/Dextrose


  (Rocephin/D5W)  55 ml @ 


 110 mls/hr  Q24H


 IVPB


   1/21/17 11:00


 1/28/17 10:59  1/25/17 11:45


 


 


 Dextrose     STAT  PRN


 IV


 Hypoglycemia  1/20/17 23:30


 2/19/17 23:29   


 


 


 Dextrose/Sodium


 Chloride


  (D5 0.45% NS)  1,000 ml @ 


 50 mls/hr  Q20H


 IV


   1/21/17 07:00


 2/20/17 06:59  1/25/17 13:31


 


 


 Diphenhydramine


 HCl


  (Benadryl)  25 mg  Q6H  PRN


 IVP


 Itching  1/21/17 10:15


 2/20/17 10:14  1/25/17 21:31


 


 


 Enalapril Maleate


  (Vasotec)  10 mg  DAILY


 ORAL


   1/21/17 09:00


 2/20/17 08:59  1/25/17 08:26


 


 


 Heparin Sodium/


 Sodium Chloride


  (Heparin 2000


 units/Ns 1000ml


 premix)  2,000 unit  ONCE  PRN


 INJ


 FOR PICC LINE PLACEMENT  1/25/17 09:30


 1/26/17 23:59   


 


 


 Lidocaine HCl


  (Xylocaine 1%


 30ml)  30 ml  ONCE  PRN


 INJ


 PICC PLACEMENT  1/25/17 09:30


 1/26/17 23:59   


 


 


 Morphine Sulfate


 4 mg  4 mg  Q4H  PRN


 IVP


 For Pain (Moderate to Severe)  1/21/17 10:45


 1/28/17 10:44  1/25/17 21:31


 


 


 Nitroglycerin


  (Ntg)  0.4 mg  Q5M X 3 DOSES PRN


 SL


 Prn Chest Pain  1/20/17 23:30


 2/19/17 23:29   


 


 


 Ondansetron HCl


  (Zofran)  4 mg  Q6H  PRN


 IVP


 Nausea & Vomiting  1/20/17 23:30


 2/19/17 23:29  1/25/17 21:31


 


 


 Pantoprazole


  (Protonix)  40 mg  DAILY


 ORAL


   1/22/17 09:00


 2/21/17 08:59  1/25/17 08:26


 


 


 Polyethylene


 Glycol


  (Miralax)  17 gm  HSPRN  PRN


 ORAL


 Constipation  1/20/17 23:30


 2/19/17 23:29   


 


 


 Sodium Bicarbonate


  (Sodium


 Bicarbonate)  50 ml  ONCE  PRN


 INJ


 FOR PICC LINE PLACEMENT  1/25/17 09:45


 1/26/17 23:59   


 


 


 Temazepam


  (Restoril)  15 mg  HSPRN  PRN


 ORAL


 Insomnia  1/20/17 23:30


 1/27/17 23:29   


 


 


 Warfarin Sodium


  (Coumadin per


 pharmacy)  1 ea  DAILY  PRN


 MISC


 Per rx protocol  1/24/17 17:00


 2/23/17 16:59   


 

















CHAVO DUNBAR Jan 25, 2017 22:50

## 2017-01-25 NOTE — GI PROGRESS NOTE
Assessment/Plan


Problems:  


(1) HIV (human immunodeficiency virus infection)


ICD Codes:  Z21 - Asymptomatic human immunodeficiency virus [HIV] infection 

status


SNOMED:  20145691


(2) GI bleeding


(3) Abdominal pain of unknown etiology


ICD Codes:  R10.9 - Unspecified abdominal pain


SNOMED:  045028836


(4) Intractable vomiting


ICD Codes:  R11.10 - Vomiting, unspecified


SNOMED:  300423867


(5) Gastritis


ICD Codes:  K29.70 - Gastritis


SNOMED:  1878452


Status:  stable, unchanged


Status Narrative


Discussed with Dr. Cleveland.


Assessment/Plan


iron panel WNL


stable H&H


elevated lipase


HIV +


coffee grounds per patient


no amlabs today





okay for DC per GI standpoint


defer EGD at this time


low fat diet, tolerating


ordered OB stool


utox >> unremarkable


ppi


fu labs





Subjective


Gastrointestinal/Abdominal:  Reports: no symptoms





Objective





Last 24 Hour Vital Signs








  Date Time  Temp Pulse Resp B/P Pulse Ox O2 Delivery O2 Flow Rate FiO2


 


1/25/17 11:55 97.2 89 20 136/82 100 Room Air  


 


1/25/17 08:26    120/85    


 


1/25/17 08:24 97.8 86 18 120/85 97 Room Air  


 


1/25/17 07:03 97.3       


 


1/25/17 04:00 97.3 80 18 147/75 97 Room Air  


 


1/25/17 00:00 97.3 74 18 130/66 95 Room Air  


 


1/24/17 20:10 97.9 75 18 125/90 98 Room Air  


 


1/24/17 16:08 97.7 84 18 133/94 99 Room Air  

















Intake and Output  


 


 1/24/17 1/25/17





 19:00 07:00


 


Intake Total 1030 ml 1200 ml


 


Balance 1030 ml 1200 ml


 


  


 


Intake Oral 600 ml 1050 ml


 


IV Total 430 ml 150 ml


 


# Voids 4 9


 


# Bowel Movements  1











Microbiology








 Date/Time


Source Procedure


Growth Status


 


 


 1/24/17 16:00


Urine,Clean Catch Urine Culture - Preliminary


NO GROWTH Resulted








Height (Feet):  6


Height (Inches):  1.00


Weight (Pounds):  239


General Appearance:  no apparent distress, alert


Cardiovascular:  normal rate


Respiratory/Chest:  normal breath sounds


Abdominal Exam:  normal bowel sounds, non tender, soft


Extremities:  normal range of motion


Objective


no active s/sx of N/V











Yashira Locke N.P. Jan 25, 2017 14:34

## 2017-01-25 NOTE — GENERAL PROGRESS NOTE
Assessment/Plan


Problem List:  


(1) Renal insufficiency


ICD Codes:  N28.9 - Disorder of kidney and ureter, unspecified


SNOMED:  417448769


(2) Intractable vomiting


ICD Codes:  R11.10 - Vomiting, unspecified


SNOMED:  857463109


(3) Abdominal pain of unknown etiology


ICD Codes:  R10.9 - Unspecified abdominal pain


SNOMED:  641496271


(4) UTI (urinary tract infection)


ICD Codes:  N39.0 - Urinary tract infection, site not specified


SNOMED:  42147210


(5) Intractable abdominal pain


ICD Codes:  R10.9 - Intractable abdominal pain


SNOMED:  54600186


(6) Hypokalemia


ICD Codes:  E87.6 - Hypokalemia


SNOMED:  68259429


(7) GI bleeding


(8) Anemia


ICD Codes:  D64.9 - Anemia, unspecified


SNOMED:  991644479


Status:  stable, progressing


Assessment/Plan


ot pt diet pain control gi f/u egd uro eval cbc bmp am dc plan





Subjective


Constitutional:  Reports: weakness


Allergies:  


Coded Allergies:  


     IODINATED CONTRAST MEDIA - IV DYE (Verified  Allergy, Severe, Shortness of 

Breath, 9/16/15)


     DORIPENEM (Verified  Allergy, Intermediate, Itching, 9/16/15)


     KETOROLAC TROMETHAMINE (Verified  Allergy, Intermediate, Hives, 9/16/15)


     TRAMADOL (Verified  Allergy, Intermediate, Hives, 9/16/15)


     ASPIRIN (Verified  Allergy, Mild, 12/7/10)


All Systems:  reviewed and negative except above


Subjective


sleepy calm sl nausea / gen pain





Objective





Last 24 Hour Vital Signs








  Date Time  Temp Pulse Resp B/P Pulse Ox O2 Delivery O2 Flow Rate FiO2


 


1/25/17 11:55 97.2 89 20 136/82 100 Room Air  


 


1/25/17 08:26    120/85    


 


1/25/17 08:24 97.8 86 18 120/85 97 Room Air  


 


1/25/17 07:03 97.3       


 


1/25/17 04:00 97.3 80 18 147/75 97 Room Air  


 


1/25/17 00:00 97.3 74 18 130/66 95 Room Air  


 


1/24/17 20:10 97.9 75 18 125/90 98 Room Air  


 


1/24/17 16:08 97.7 84 18 133/94 99 Room Air  

















Intake and Output  


 


 1/24/17 1/25/17





 19:00 07:00


 


Intake Total 1030 ml 1200 ml


 


Balance 1030 ml 1200 ml


 


  


 


Intake Oral 600 ml 1050 ml


 


IV Total 430 ml 150 ml


 


# Voids 4 9


 


# Bowel Movements  1








Height (Feet):  6


Height (Inches):  1.00


Weight (Pounds):  239


General Appearance:  lethargic


EENT:  normal ENT inspection


Neck:  normal alignment


Cardiovascular:  normal peripheral pulses, normal rate, regular rhythm


Respiratory/Chest:  chest wall non-tender, lungs clear, normal breath sounds


Abdomen:  normal bowel sounds, non tender, soft


Extremities:  normal inspection


Edema:  no edema noted Arm (L), no edema noted Arm (R), no edema noted Leg (L), 

no edema noted Leg (R), no edema noted Pedal (L), no edema noted Pedal (R), no 

edema noted Generalized


Neurologic:  responsive, motor weakness


Skin:  normal pigmentation, warm/dry











TUCKER GUILLERMO Jan 25, 2017 13:23

## 2017-01-25 NOTE — NEPHROLOGY PROGRESS NOTE
Assessment/Plan


Problem List:  


(1) Nausea, vomiting, and diarrhea


(2) Abdominal pain


(3) Hypernatremia


(4) UTI (urinary tract infection)


(5) HIV (human immunodeficiency virus infection)


(6) Hematuria


(7) Hypokalemia


Plan


Continue IVF


Continue pain management


GI f/u


Coumadin per hematology


Urology consult rec


Monitor lytes


Abx per ID


Monitor intake and output


AM labs





Subjective


Constitutional:  Denies: chills, diaphoresis, fever, malaise, no symptoms, other

, weakness


HEENT:  Denies: blurred vision, double vision, ear discharge, ear pain, eye pain

, mouth pain, mouth swelling, no symptoms, nose congestion, nose pain, other, 

tearing, throat pain, throat swelling


Genitourinary:  Reports: hematuria, pain


Neurologic/Psychiatric:  Denies: anxiety, depressed, emotional problems, 

headache, no symptoms, numbness, other, paresthesia, pre-existing deficit, 

seizure, tingling, tremors, weakness


Subjective


In bed, in no distress





Objective


Objective





Last 24 Hour Vital Signs








  Date Time  Temp Pulse Resp B/P Pulse Ox O2 Delivery O2 Flow Rate FiO2


 


1/25/17 07:03 97.3       


 


1/25/17 04:00 97.3 80 18 147/75 97 Room Air  


 


1/25/17 00:00 97.3 74 18 130/66 95 Room Air  


 


1/24/17 20:10 97.9 75 18 125/90 98 Room Air  


 


1/24/17 16:08 97.7 84 18 133/94 99 Room Air  


 


1/24/17 11:41 97.0 65 18 107/53 97 Room Air  


 


1/24/17 08:51    127/74    

















Intake and Output  


 


 1/24/17 1/25/17





 19:00 07:00


 


Intake Total 1030 ml 1200 ml


 


Balance 1030 ml 1200 ml


 


  


 


Intake Oral 600 ml 1050 ml


 


IV Total 430 ml 150 ml


 


# Voids 4 9


 


# Bowel Movements  1








Height (Feet):  6


Height (Inches):  1.00


Weight (Pounds):  239


General Appearance:  no apparent distress, alert


EENT:  normal ENT inspection


Neck:  non-tender, normal alignment


Cardiovascular:  normal rate, regular rhythm, no JVD


Respiratory/Chest:  lungs clear, normal breath sounds, no respiratory distress


Abdomen:  non tender, soft, no organomegaly, no mass


Extremities:  non-tender, normal inspection, no calf tenderness, normal 

capillary refill


Neurologic:  alert, oriented x 3, responsive, normal mood/affect











Berenice Carroll N.P. Jan 25, 2017 08:17

## 2017-01-26 NOTE — PULMONOLOGY PROGRESS NOTE
Assessment/Plan


Problems:  


(1) UTI (urinary tract infection)


(2) UGIB (upper gastrointestinal bleed)


(3) Gastroparesis


(4) Chronic pancreatitis


(5) HTN (hypertension)


(6) Narcotic dependence


(7) Diabetes


(8) HIV (human immunodeficiency virus infection)


Assessment/Plan


respiratroy treatment


antitussives





 all notes, meds noted





Subjective


ROS Limited/Unobtainable:  No


Interval Events:  still coughing, hematuria resolved


Constitutional:  Reports: no symptoms


HEENT:  Repors: no symptoms


Allergies:  


Coded Allergies:  


     IODINATED CONTRAST MEDIA - IV DYE (Verified  Allergy, Severe, Shortness of 

Breath, 9/16/15)


     DORIPENEM (Verified  Allergy, Intermediate, Itching, 9/16/15)


     KETOROLAC TROMETHAMINE (Verified  Allergy, Intermediate, Hives, 9/16/15)


     TRAMADOL (Verified  Allergy, Intermediate, Hives, 9/16/15)


     ASPIRIN (Verified  Allergy, Mild, 12/7/10)





Objective





Last 24 Hour Vital Signs








  Date Time  Temp Pulse Resp B/P Pulse Ox O2 Delivery O2 Flow Rate FiO2


 


1/26/17 16:07 97.3 84 20 146/83 100 Room Air  


 


1/26/17 12:39 97.7 81 20 114/76 96 Room Air  


 


1/26/17 09:27    132/60    


 


1/26/17 08:25 97.0 91 20 137/83 97 Room Air  


 


1/26/17 04:00 97.7 80 18 116/78 96 Room Air  


 


1/26/17 00:00 97.9 89 18 120/78 95 Room Air  


 


1/25/17 20:05 97.7 74 20 151/87 98 Room Air  

















Intake and Output  


 


 1/25/17 1/26/17





 19:00 07:00


 


Intake Total 1350 ml 1810 ml


 


Balance 1350 ml 1810 ml


 


  


 


Intake Oral 720 ml 1260 ml


 


IV Total 630 ml 550 ml


 


# Voids 2 3








General Appearance:  WD/WN


HEENT:  normocephalic, atraumatic


Respiratory/Chest:  chest wall non-tender, lungs clear


Cardiovascular:  normal peripheral pulses, normal rate


Abdomen:  normal bowel sounds, soft, non tender


Extremities:  no cyanosis


Skin:  no rash





Microbiology








 Date/Time


Source Procedure


Growth Status


 


 


 1/24/17 16:00


Urine,Clean Catch Urine Culture - Preliminary


NO GROWTH AFTER 24 HOURS Resulted








Laboratory Tests


1/25/17 20:30: 


Rapid Plasma Reagin [Pending], Toxoplasma IgG Antibody [Pending], Toxoplasma 

IgM Antibody [Pending]


1/25/17 21:30: Chlamydia trachomatis RNA [Pending]


1/26/17 04:00: 


White Blood Count 3.7L, Red Blood Count 4.03L, Hemoglobin 10.8L, Hematocrit 

35.1L, Mean Corpuscular Volume 87, Mean Corpuscular Hemoglobin 26.8L, Mean 

Corpuscular Hemoglobin Concent 30.8L, Red Cell Distribution Width 15.3H, 

Platelet Count 185, Mean Platelet Volume 8.0, Neutrophils (%) (Auto) 38.7L, 

Lymphocytes (%) (Auto) 41.0, Monocytes (%) (Auto) 14.3H, Eosinophils (%) (Auto) 

4.1H, Basophils (%) (Auto) 1.9, Prothrombin Time 13.9H, Prothromb Time 

International Ratio 1.4H, Sodium Level 140, Potassium Level 4.5, Chloride Level 

101, Carbon Dioxide Level 24, Anion Gap 15, Blood Urea Nitrogen 21, Creatinine 

1.6H, Estimat Glomerular Filtration Rate 55.3, Glucose Level 120H, Calcium 

Level 8.3L, TB Test (T-Spot) [Pending], TB Test Nil Control (T-Spot) [Pending], 

TB Test Panel A (T-Spot) [Pending], TB Test Panel B (T-Spot) [Pending], TB Test 

Positive Control (T-Spot) [Pending]





Current Medications








 Medications


  (Trade)  Dose


 Ordered  Sig/Kirsten


 Route


 PRN Reason  Start Time


 Stop Time Status Last Admin


Dose Admin


 


 Acetaminophen


  (Tylenol)  650 mg  Q4H  PRN


 ORAL


 fever  1/20/17 23:30


 2/19/17 23:29   


 


 


 Al Hydroxide/Mg


 Hydroxide


  (Mylanta II)  30 ml  Q6H  PRN


 ORAL


 dyspepsia  1/20/17 23:30


 2/19/17 23:29   


 


 


 Dextrose     STAT  PRN


 IV


 Hypoglycemia  1/20/17 23:30


 2/19/17 23:29   


 


 


 Dextrose/Sodium


 Chloride


  (D5 0.45% NS)  1,000 ml @ 


 50 mls/hr  Q20H


 IV


   1/21/17 07:00


 2/20/17 06:59  1/26/17 03:38


 


 


 Diphenhydramine


 HCl


  (Benadryl)  25 mg  Q6H  PRN


 IVP


 Itching  1/21/17 10:15


 2/20/17 10:14  1/26/17 09:48


 


 


 Enalapril Maleate


  (Vasotec)  10 mg  DAILY


 ORAL


   1/21/17 09:00


 2/20/17 08:59  1/26/17 09:27


 


 


 Heparin Sodium/


 Sodium Chloride


  (Heparin 2000


 units/Ns 1000ml


 premix)  2,000 unit  ONCE  PRN


 INJ


 FOR PICC LINE PLACEMENT  1/25/17 09:30


 1/26/17 23:59   


 


 


 Lidocaine HCl


  (Xylocaine 1%


 30ml)  30 ml  ONCE  PRN


 INJ


 PICC PLACEMENT  1/25/17 09:30


 1/26/17 23:59   


 


 


 Morphine Sulfate


  (Morphine


 Sulfate)  4 mg  Q4H  PRN


 IVP


 For Pain (Moderate to Severe)  1/21/17 10:45


 1/28/17 10:44  1/26/17 09:44


 


 


 Nitroglycerin


  (Ntg)  0.4 mg  Q5M X 3 DOSES PRN


 SL


 Prn Chest Pain  1/20/17 23:30


 2/19/17 23:29   


 


 


 Ondansetron HCl


  (Zofran)  4 mg  Q6H  PRN


 IVP


 Nausea & Vomiting  1/20/17 23:30


 2/19/17 23:29  1/26/17 09:41


 


 


 Pantoprazole


  (Protonix)  40 mg  DAILY


 ORAL


   1/22/17 09:00


 2/21/17 08:59  1/26/17 09:26


 


 


 Polyethylene


 Glycol


  (Miralax)  17 gm  HSPRN  PRN


 ORAL


 Constipation  1/20/17 23:30


 2/19/17 23:29   


 


 


 Sodium Bicarbonate


  (Sodium


 Bicarbonate)  50 ml  ONCE  PRN


 INJ


 FOR PICC LINE PLACEMENT  1/25/17 09:45


 1/26/17 23:59   


 


 


 Temazepam


  (Restoril)  15 mg  HSPRN  PRN


 ORAL


 Insomnia  1/20/17 23:30


 1/27/17 23:29   


 


 


 Warfarin Sodium


  (Coumadin per


 pharmacy)  1 ea  DAILY  PRN


 MISC


 Per rx protocol  1/24/17 17:00


 2/23/17 16:59   


 

















CHAVO DUNBAR Jan 26, 2017 17:05

## 2017-01-26 NOTE — NEPHROLOGY PROGRESS NOTE
Assessment/Plan


Problem List:  


(1) Renal insufficiency


Assessment:  Cr improving.





(2) HIV (human immunodeficiency virus infection)


(3) Abdominal pain of unknown etiology


(4) Hypokalemia


(5) Nausea, vomiting, and diarrhea


(6) Abdominal pain


Plan


d/c home today





Subjective


Subjective


for discharge today.





Objective


Objective





Last 24 Hour Vital Signs








  Date Time  Temp Pulse Resp B/P Pulse Ox O2 Delivery O2 Flow Rate FiO2


 


1/26/17 17:33 97.3       


 


1/26/17 16:07 97.3 84 20 146/83 100 Room Air  


 


1/26/17 12:39 97.7 81 20 114/76 96 Room Air  


 


1/26/17 09:27    132/60    


 


1/26/17 08:25 97.0 91 20 137/83 97 Room Air  


 


1/26/17 04:00 97.7 80 18 116/78 96 Room Air  


 


1/26/17 00:00 97.9 89 18 120/78 95 Room Air  


 


1/25/17 20:05 97.7 74 20 151/87 98 Room Air  

















Intake and Output  


 


 1/25/17 1/26/17





 19:00 07:00


 


Intake Total 1350 ml 1810 ml


 


Balance 1350 ml 1810 ml


 


  


 


Intake Oral 720 ml 1260 ml


 


IV Total 630 ml 550 ml


 


# Voids 2 3








Laboratory Tests


1/25/17 20:30: 


Rapid Plasma Reagin [Pending], Toxoplasma IgG Antibody [Pending], Toxoplasma 

IgM Antibody [Pending]


1/25/17 21:30: Chlamydia trachomatis RNA [Pending]


1/26/17 04:00: 


White Blood Count 3.7L, Red Blood Count 4.03L, Hemoglobin 10.8L, Hematocrit 

35.1L, Mean Corpuscular Volume 87, Mean Corpuscular Hemoglobin 26.8L, Mean 

Corpuscular Hemoglobin Concent 30.8L, Red Cell Distribution Width 15.3H, 

Platelet Count 185, Mean Platelet Volume 8.0, Neutrophils (%) (Auto) 38.7L, 

Lymphocytes (%) (Auto) 41.0, Monocytes (%) (Auto) 14.3H, Eosinophils (%) (Auto) 

4.1H, Basophils (%) (Auto) 1.9, Prothrombin Time 13.9H, Prothromb Time 

International Ratio 1.4H, Sodium Level 140, Potassium Level 4.5, Chloride Level 

101, Carbon Dioxide Level 24, Anion Gap 15, Blood Urea Nitrogen 21, Creatinine 

1.6H, Estimat Glomerular Filtration Rate 55.3, Glucose Level 120H, Calcium 

Level 8.3L, TB Test (T-Spot) [Pending], TB Test Nil Control (T-Spot) [Pending], 

TB Test Panel A (T-Spot) [Pending], TB Test Panel B (T-Spot) [Pending], TB Test 

Positive Control (T-Spot) [Pending]


Height (Feet):  6


Height (Inches):  1.00


Weight (Pounds):  239


General Appearance:  no apparent distress


Cardiovascular:  normal rate, regular rhythm


Respiratory/Chest:  lungs clear


Abdomen:  non tender, soft











LIAM GARZA Jan 26, 2017 19:53

## 2017-01-26 NOTE — GI PROGRESS NOTE
Assessment/Plan


Problems:  


(1) HIV (human immunodeficiency virus infection)


ICD Codes:  Z21 - Asymptomatic human immunodeficiency virus [HIV] infection 

status


SNOMED:  03363933


(2) GI bleeding


(3) Abdominal pain of unknown etiology


ICD Codes:  R10.9 - Unspecified abdominal pain


SNOMED:  906391387


(4) Intractable vomiting


ICD Codes:  R11.10 - Vomiting, unspecified


SNOMED:  328906097


(5) Gastritis


ICD Codes:  K29.70 - Gastritis


SNOMED:  5381143


Status:  stable


Status Narrative


Discussed with Dr. Cleveland.


Assessment/Plan


iron panel WNL


stable H&H


elevated lipase


HIV +


coffee grounds per patient


no amlabs today





okay for DC per GI standpoint


defer EGD at this time


low fat diet, tolerating


OB stool uncollected


utox >> unremarkable


ppi


fu labs





Subjective


Gastrointestinal/Abdominal:  Reports: no symptoms





Objective





Last 24 Hour Vital Signs








  Date Time  Temp Pulse Resp B/P Pulse Ox O2 Delivery O2 Flow Rate FiO2


 


1/26/17 12:39 97.7 81 20 114/76 96 Room Air  


 


1/26/17 09:27    132/60    


 


1/26/17 08:25 97.0 91 20 137/83 97 Room Air  


 


1/26/17 04:00 97.7 80 18 116/78 96 Room Air  


 


1/26/17 00:00 97.9 89 18 120/78 95 Room Air  


 


1/25/17 20:05 97.7 74 20 151/87 98 Room Air  


 


1/25/17 16:05 98.2 74 23 110/63 100 Room Air  

















Intake and Output  


 


 1/25/17 1/26/17





 19:00 07:00


 


Intake Total 1350 ml 1810 ml


 


Balance 1350 ml 1810 ml


 


  


 


Intake Oral 720 ml 1260 ml


 


IV Total 630 ml 550 ml


 


# Voids 2 3











Laboratory Tests








Test


  1/25/17


16:50 1/25/17


20:30 1/25/17


21:30 1/26/17


04:00


 


White Blood Count


  5.6 K/UL


(4.8-10.8) 


  


  3.7 K/UL


(4.8-10.8)  L


 


Red Blood Count


  4.64 M/UL


(4.70-6.10)  L 


  


  4.03 M/UL


(4.70-6.10)  L


 


Hemoglobin


  12.6 G/DL


(14.2-18.0)  L 


  


  10.8 G/DL


(14.2-18.0)  L


 


Hematocrit


  39.9 %


(42.0-52.0)  L 


  


  35.1 %


(42.0-52.0)  L


 


Mean Corpuscular Volume 86 FL (80-99)     87 FL (80-99)  


 


Mean Corpuscular Hemoglobin


  27.0 PG


(27.0-31.0) 


  


  26.8 PG


(27.0-31.0)  L


 


Mean Corpuscular Hemoglobin


Concent 31.4 G/DL


(32.0-36.0)  L 


  


  30.8 G/DL


(32.0-36.0)  L


 


Red Cell Distribution Width


  14.7 %


(11.6-14.8) 


  


  15.3 %


(11.6-14.8)  H


 


Platelet Count


  159 K/UL


(150-450) 


  


  185 K/UL


(150-450)


 


Mean Platelet Volume


  7.3 FL


(6.5-10.1) 


  


  8.0 FL


(6.5-10.1)


 


Neutrophils (%) (Auto)


  47.6 %


(45.0-75.0) 


  


  38.7 %


(45.0-75.0)  L


 


Lymphocytes (%) (Auto)


  37.9 %


(20.0-45.0) 


  


  41.0 %


(20.0-45.0)


 


Monocytes (%) (Auto)


  10.3 %


(1.0-10.0)  H 


  


  14.3 %


(1.0-10.0)  H


 


Eosinophils (%) (Auto)


  2.1 %


(0.0-3.0) 


  


  4.1 %


(0.0-3.0)  H


 


Basophils (%) (Auto)


  2.2 %


(0.0-2.0)  H 


  


  1.9 %


(0.0-2.0)


 


Prothrombin Time


  12.5 SEC


(9.30-11.50)  H 


  


  13.9 SEC


(9.30-11.50)  H


 


Prothromb Time International


Ratio 1.2 (0.9-1.1)


H 


  


  1.4 (0.9-1.1)


H


 


Sodium Level


  138 mEQ/L


(135-145) 


  


  140 mEQ/L


(135-145)


 


Potassium Level


  4.5 mEQ/L


(3.4-4.9) 


  


  4.5 mEQ/L


(3.4-4.9)


 


Chloride Level


  99 mEQ/L


() 


  


  101 mEQ/L


()


 


Carbon Dioxide Level


  26 mEQ/L


(20-30) 


  


  24 mEQ/L


(20-30)


 


Anion Gap 13 (5-15)     15 (5-15)  


 


Blood Urea Nitrogen


  17 mg/dL


(7-23) 


  


  21 mg/dL


(7-23)


 


Creatinine


  1.6 mg/dL


(0.7-1.2)  H 


  


  1.6 mg/dL


(0.7-1.2)  H


 


Estimat Glomerular Filtration


Rate 55.3 mL/min


(>60) 


  


  55.3 mL/min


(>60)


 


Glucose Level


  117 mg/dL


()  H 


  


  120 mg/dL


()  H


 


Calcium Level


  9.1 mg/dL


(8.6-10.2) 


  


  8.3 mg/dL


(8.6-10.2)  L


 


Amylase Level


  185 U/L


()  H 


  


  


 


 


Lipase 40 U/L (< 60)     


 


Rapid Plasma Reagin  Pending    


 


Toxoplasma IgG Antibody  Pending    


 


Toxoplasma IgM Antibody  Pending    


 


Chlamydia trachomatis RNA   Pending   


 


TB Test (T-Spot)    Pending  


 


TB Test Nil Control (T-Spot)    Pending  


 


TB Test Panel A (T-Spot)    Pending  


 


TB Test Panel B (T-Spot)    Pending  


 


TB Test Positive Control


(T-Spot) 


  


  


  Pending  


 








Height (Feet):  6


Height (Inches):  1.00


Weight (Pounds):  239


General Appearance:  no apparent distress, alert


Cardiovascular:  normal rate


Respiratory/Chest:  normal breath sounds, no respiratory distress


Abdominal Exam:  normal bowel sounds, non tender, soft


Extremities:  normal range of motion


Objective


no active s/sx of N/V











Yashira Locke N.P. Jan 26, 2017 14:18

## 2017-01-26 NOTE — INFECTIOUS DISEASES PROG NOTE
Assessment/Plan


Problems:  


(1) HIV (human immunodeficiency virus infection)


Assessment & Plan:  with positive rapid screening test, and positive HIV-1 

antibody ,  viral load, with CD4 level are pending . will check RPR, GC&

Chlamydia , toxo IgG, and T spot test,  patient denied any previous exposure to 

HIV. except multiple blood transfusion in the past, recommend follow up with 

HIV provider as an out patient to start antiretroviral therapy.





(2) UTI (urinary tract infection)


Assessment & Plan:  received  ceftriaxone empirically, urine culture is negative

, will D/C ceftriaxon





(3) Hypokalemia


Assessment & Plan:  improved, suspect dehydration related from vomiting, 

replace as needed, continue IVF for hydration





(4) Hypocalcemia


Assessment & Plan:  improved, most likely due to GI loss, replace as needed, 

monitor calcium level





(5) UGIB (upper gastrointestinal bleed)


Assessment & Plan:  may need EGD,  GI is following , monitor H&H, transfuse as 

needed





(6) Intractable abdominal pain


Assessment & Plan:  continue pain management as per primary ,  CT abdomen and 

pelvis showed nonobstructing stone .





(7) Hematuria


Assessment & Plan:  recommend urology eval and cystoscopy








Subjective


Genitourinary:  Reports: hematuria


Allergies:  


Coded Allergies:  


     IODINATED CONTRAST MEDIA - IV DYE (Verified  Allergy, Severe, Shortness of 

Breath, 9/16/15)


     DORIPENEM (Verified  Allergy, Intermediate, Itching, 9/16/15)


     KETOROLAC TROMETHAMINE (Verified  Allergy, Intermediate, Hives, 9/16/15)


     TRAMADOL (Verified  Allergy, Intermediate, Hives, 9/16/15)


     ASPIRIN (Verified  Allergy, Mild, 12/7/10)


All Systems:  reviewed and negative except above





Objective


Vital Signs





Last 24 Hour Vital Signs








  Date Time  Temp Pulse Resp B/P Pulse Ox O2 Delivery O2 Flow Rate FiO2


 


1/26/17 17:33 97.3       


 


1/26/17 16:07 97.3 84 20 146/83 100 Room Air  


 


1/26/17 12:39 97.7 81 20 114/76 96 Room Air  


 


1/26/17 09:27    132/60    


 


1/26/17 08:25 97.0 91 20 137/83 97 Room Air  


 


1/26/17 04:00 97.7 80 18 116/78 96 Room Air  


 


1/26/17 00:00 97.9 89 18 120/78 95 Room Air  


 


1/25/17 20:05 97.7 74 20 151/87 98 Room Air  








Height (Feet):  6


Height (Inches):  1.00


Weight (Pounds):  239


General Appearance:  WD/WN, no acute distress


HEENT:  normocephalic, atraumatic, anicteric, mucous membranes moist


Respiratory/Chest:  chest wall non-tender, lungs clear, normal breath sounds, 

no respiratory distress, no accessory muscle use


Cardiovascular:  normal peripheral pulses, normal rate, regular rhythm, no 

gallop/murmur


Abdomen:  normal bowel sounds, soft, non tender, no organomegaly, non distended

, no mass


Extremities:  no cyanosis, no clubbing


Skin:  no rash, no lesions, no ulcers





Microbiology








 Date/Time


Source Procedure


Growth Status


 


 


 1/24/17 16:00


Urine,Clean Catch Urine Culture - Preliminary


NO GROWTH AFTER 24 HOURS Resulted











Laboratory Tests








Test


  1/25/17


20:30 1/25/17


21:30 1/26/17


04:00


 


Rapid Plasma Reagin Pending    


 


Toxoplasma IgG Antibody Pending    


 


Toxoplasma IgM Antibody Pending    


 


Chlamydia trachomatis RNA  Pending   


 


White Blood Count


  


  


  3.7 K/UL


(4.8-10.8)  L


 


Red Blood Count


  


  


  4.03 M/UL


(4.70-6.10)  L


 


Hemoglobin


  


  


  10.8 G/DL


(14.2-18.0)  L


 


Hematocrit


  


  


  35.1 %


(42.0-52.0)  L


 


Mean Corpuscular Volume   87 FL (80-99)  


 


Mean Corpuscular Hemoglobin


  


  


  26.8 PG


(27.0-31.0)  L


 


Mean Corpuscular Hemoglobin


Concent 


  


  30.8 G/DL


(32.0-36.0)  L


 


Red Cell Distribution Width


  


  


  15.3 %


(11.6-14.8)  H


 


Platelet Count


  


  


  185 K/UL


(150-450)


 


Mean Platelet Volume


  


  


  8.0 FL


(6.5-10.1)


 


Neutrophils (%) (Auto)


  


  


  38.7 %


(45.0-75.0)  L


 


Lymphocytes (%) (Auto)


  


  


  41.0 %


(20.0-45.0)


 


Monocytes (%) (Auto)


  


  


  14.3 %


(1.0-10.0)  H


 


Eosinophils (%) (Auto)


  


  


  4.1 %


(0.0-3.0)  H


 


Basophils (%) (Auto)


  


  


  1.9 %


(0.0-2.0)


 


Prothrombin Time


  


  


  13.9 SEC


(9.30-11.50)  H


 


Prothromb Time International


Ratio 


  


  1.4 (0.9-1.1)


H


 


Sodium Level


  


  


  140 mEQ/L


(135-145)


 


Potassium Level


  


  


  4.5 mEQ/L


(3.4-4.9)


 


Chloride Level


  


  


  101 mEQ/L


()


 


Carbon Dioxide Level


  


  


  24 mEQ/L


(20-30)


 


Anion Gap   15 (5-15)  


 


Blood Urea Nitrogen


  


  


  21 mg/dL


(7-23)


 


Creatinine


  


  


  1.6 mg/dL


(0.7-1.2)  H


 


Estimat Glomerular Filtration


Rate 


  


  55.3 mL/min


(>60)


 


Glucose Level


  


  


  120 mg/dL


()  H


 


Calcium Level


  


  


  8.3 mg/dL


(8.6-10.2)  L


 


TB Test (T-Spot)   Pending  


 


TB Test Nil Control (T-Spot)   Pending  


 


TB Test Panel A (T-Spot)   Pending  


 


TB Test Panel B (T-Spot)   Pending  


 


TB Test Positive Control


(T-Spot) 


  


  Pending  


 











Current Medications








 Medications


  (Trade)  Dose


 Ordered  Sig/Kirsten


 Route


 PRN Reason  Start Time


 Stop Time Status Last Admin


Dose Admin


 


 Acetaminophen


  (Tylenol)  650 mg  Q4H  PRN


 ORAL


 fever  1/20/17 23:30


 2/19/17 23:29   


 


 


 Al Hydroxide/Mg


 Hydroxide


  (Mylanta II)  30 ml  Q6H  PRN


 ORAL


 dyspepsia  1/20/17 23:30


 2/19/17 23:29   


 


 


 Dextrose     STAT  PRN


 IV


 Hypoglycemia  1/20/17 23:30


 2/19/17 23:29   


 


 


 Dextrose/Sodium


 Chloride


  (D5 0.45% NS)  1,000 ml @ 


 50 mls/hr  Q20H


 IV


   1/21/17 07:00


 2/20/17 06:59  1/26/17 03:38


 


 


 Diphenhydramine


 HCl


  (Benadryl)  25 mg  Q6H  PRN


 IVP


 Itching  1/21/17 10:15


 2/20/17 10:14  1/26/17 17:03


 


 


 Enalapril Maleate


  (Vasotec)  10 mg  DAILY


 ORAL


   1/21/17 09:00


 2/20/17 08:59  1/26/17 09:27


 


 


 Heparin Sodium/


 Sodium Chloride


  (Heparin 2000


 units/Ns 1000ml


 premix)  2,000 unit  ONCE  PRN


 INJ


 FOR PICC LINE PLACEMENT  1/25/17 09:30


 1/26/17 23:59   


 


 


 Lidocaine HCl


  (Xylocaine 1%


 30ml)  30 ml  ONCE  PRN


 INJ


 PICC PLACEMENT  1/25/17 09:30


 1/26/17 23:59   


 


 


 Morphine Sulfate


  (Morphine


 Sulfate)  4 mg  Q4H  PRN


 IVP


 For Pain (Moderate to Severe)  1/21/17 10:45


 1/28/17 10:44  1/26/17 17:03


 


 


 Nitroglycerin


  (Ntg)  0.4 mg  Q5M X 3 DOSES PRN


 SL


 Prn Chest Pain  1/20/17 23:30


 2/19/17 23:29   


 


 


 Ondansetron HCl


  (Zofran)  4 mg  Q6H  PRN


 IVP


 Nausea & Vomiting  1/20/17 23:30


 2/19/17 23:29  1/26/17 17:03


 


 


 Pantoprazole


  (Protonix)  40 mg  DAILY


 ORAL


   1/22/17 09:00


 2/21/17 08:59  1/26/17 09:26


 


 


 Polyethylene


 Glycol


  (Miralax)  17 gm  HSPRN  PRN


 ORAL


 Constipation  1/20/17 23:30


 2/19/17 23:29   


 


 


 Sodium Bicarbonate


  (Sodium


 Bicarbonate)  50 ml  ONCE  PRN


 INJ


 FOR PICC LINE PLACEMENT  1/25/17 09:45


 1/26/17 23:59   


 


 


 Temazepam


  (Restoril)  15 mg  HSPRN  PRN


 ORAL


 Insomnia  1/20/17 23:30


 1/27/17 23:29   


 


 


 Warfarin Sodium


  (Coumadin per


 pharmacy)  1 ea  DAILY  PRN


 MISC


 Per rx protocol  1/24/17 17:00


 2/23/17 16:59   


 

















Eitan Cantu M.D. Jan 26, 2017 17:52

## 2017-01-26 NOTE — DIAGNOSTIC IMAGING REPORT
--------------- APPROVED REPORT --------------





CPT Code: 16425



Present Symptoms

Lower Extremity Pain:  Bilateral 





BILATERAL: Imaging reveals a patent deep venous system bilaterally. There is no evidence 

of thrombus within the femoral, popliteal or tibial segments. The greater saphenous veins 

are also within normal limits. Doppler indicates normal spontaneous flow within these 

segments.

## 2017-01-26 NOTE — GENERAL PROGRESS NOTE
Assessment/Plan


Assessment/Plan


IMPRESSION:  


1. Anemia 2/2 GI bleed - currently h/h stable, egd deferred for now given 

stable h/h


2. Anemia of chronic disease.


3. Anemia of kidney disease.


4. Chronic kidney disease.


5. Coagulopathy, secondary to coumadin.


6. Hypercoagulable state.


7. History of deep vein thrombosis s/p ivc filter on coumadin


8. History of pulmonary embolism s/p ivc filter


9. Status post IVC filter placement.


10. Drug-seeking behavior.


 


RECOMMENDATIONS:  


1. Monitor counts


2. Watch coagulopathy


3. Continue coumadin


4. INR 2-3 goal


5. pRBC transfusion as needed basis, goal >7


6. Followup on ID, GI, pulm recs


7. Skin care


8.  Staff





Sincerely,





Jb Parmar MD





Subjective


Constitutional:  Reports: no symptoms


HEENT:  Reports: no symptoms


Cardiovascular:  Reports: no symptoms


Respiratory:  Reports: no symptoms


Gastrointestinal/Abdominal:  Reports: poor appetite


Genitourinary:  Reports: no symptoms


Neurologic/Psychiatric:  Reports: no symptoms


Endocrine:  Reports: no symptoms


Hematologic/Lymphatic:  Reports: anemia


Allergies:  


Coded Allergies:  


     IODINATED CONTRAST MEDIA - IV DYE (Verified  Allergy, Severe, Shortness of 

Breath, 9/16/15)


     DORIPENEM (Verified  Allergy, Intermediate, Itching, 9/16/15)


     KETOROLAC TROMETHAMINE (Verified  Allergy, Intermediate, Hives, 9/16/15)


     TRAMADOL (Verified  Allergy, Intermediate, Hives, 9/16/15)


     ASPIRIN (Verified  Allergy, Mild, 12/7/10)


Subjective


without bleeding, h/h is stable, on coumadin





Objective





Last 24 Hour Vital Signs








  Date Time  Temp Pulse Resp B/P Pulse Ox O2 Delivery O2 Flow Rate FiO2


 


1/26/17 12:39 97.7 81 20 114/76 96 Room Air  


 


1/26/17 09:27    132/60    


 


1/26/17 08:25 97.0 91 20 137/83 97 Room Air  


 


1/26/17 04:00 97.7 80 18 116/78 96 Room Air  


 


1/26/17 00:00 97.9 89 18 120/78 95 Room Air  


 


1/25/17 20:05 97.7 74 20 151/87 98 Room Air  


 


1/25/17 16:05 98.2 74 23 110/63 100 Room Air  

















Intake and Output  


 


 1/25/17 1/26/17





 19:00 07:00


 


Intake Total 1350 ml 1810 ml


 


Balance 1350 ml 1810 ml


 


  


 


Intake Oral 720 ml 1260 ml


 


IV Total 630 ml 550 ml


 


# Voids 2 3








Laboratory Tests


1/25/17 16:50: 


White Blood Count 5.6, Red Blood Count 4.64L, Hemoglobin 12.6L, Hematocrit 39.9L

, Mean Corpuscular Volume 86, Mean Corpuscular Hemoglobin 27.0, Mean 

Corpuscular Hemoglobin Concent 31.4L, Red Cell Distribution Width 14.7, 

Platelet Count 159, Mean Platelet Volume 7.3, Neutrophils (%) (Auto) 47.6, 

Lymphocytes (%) (Auto) 37.9, Monocytes (%) (Auto) 10.3H, Eosinophils (%) (Auto) 

2.1, Basophils (%) (Auto) 2.2H, Prothrombin Time 12.5H, Prothromb Time 

International Ratio 1.2H, Sodium Level 138, Potassium Level 4.5, Chloride Level 

99, Carbon Dioxide Level 26, Anion Gap 13, Blood Urea Nitrogen 17, Creatinine 

1.6H, Estimat Glomerular Filtration Rate 55.3, Glucose Level 117H, Calcium 

Level 9.1, Amylase Level 185H, Lipase 40


1/25/17 20:30: 


Rapid Plasma Reagin [Pending], Toxoplasma IgG Antibody [Pending], Toxoplasma 

IgM Antibody [Pending]


1/25/17 21:30: Chlamydia trachomatis RNA [Pending]


1/26/17 04:00: 


White Blood Count 3.7L, Red Blood Count 4.03L, Hemoglobin 10.8L, Hematocrit 

35.1L, Mean Corpuscular Volume 87, Mean Corpuscular Hemoglobin 26.8L, Mean 

Corpuscular Hemoglobin Concent 30.8L, Red Cell Distribution Width 15.3H, 

Platelet Count 185, Mean Platelet Volume 8.0, Neutrophils (%) (Auto) 38.7L, 

Lymphocytes (%) (Auto) 41.0, Monocytes (%) (Auto) 14.3H, Eosinophils (%) (Auto) 

4.1H, Basophils (%) (Auto) 1.9, Prothrombin Time 13.9H, Prothromb Time 

International Ratio 1.4H, Sodium Level 140, Potassium Level 4.5, Chloride Level 

101, Carbon Dioxide Level 24, Anion Gap 15, Blood Urea Nitrogen 21, Creatinine 

1.6H, Estimat Glomerular Filtration Rate 55.3, Glucose Level 120H, Calcium 

Level 8.3L, TB Test (T-Spot) [Pending], TB Test Nil Control (T-Spot) [Pending], 

TB Test Panel A (T-Spot) [Pending], TB Test Panel B (T-Spot) [Pending], TB Test 

Positive Control (T-Spot) [Pending]


Height (Feet):  6


Height (Inches):  1.00


Weight (Pounds):  239


General Appearance:  no apparent distress


EENT:  TMs normal


Neck:  normal alignment


Cardiovascular:  regular rhythm


Respiratory/Chest:  normal breath sounds


Abdomen:  non tender


Extremities:  normal range of motion


Edema:  1+ Leg (L), 1+ Leg (R)


Edema:  mild edema


Neurologic:  alert


Skin:  warm/dry











Jb Parmar Jan 26, 2017 13:06

## 2017-01-26 NOTE — GENERAL PROGRESS NOTE
Assessment/Plan


Problem List:  


(1) Renal insufficiency


ICD Codes:  N28.9 - Disorder of kidney and ureter, unspecified


SNOMED:  239907817


(2) Intractable vomiting


ICD Codes:  R11.10 - Vomiting, unspecified


SNOMED:  433082670


(3) Abdominal pain of unknown etiology


ICD Codes:  R10.9 - Unspecified abdominal pain


SNOMED:  758143593


(4) UTI (urinary tract infection)


ICD Codes:  N39.0 - Urinary tract infection, site not specified


SNOMED:  37502448


(5) Intractable abdominal pain


ICD Codes:  R10.9 - Intractable abdominal pain


SNOMED:  31596431


(6) Hypokalemia


ICD Codes:  E87.6 - Hypokalemia


SNOMED:  82068678


(7) GI bleeding


(8) Anemia


ICD Codes:  D64.9 - Anemia, unspecified


SNOMED:  385412772


Status:  stable, progressing, tolerating diet


Assessment/Plan


ot pt diet pain control gi f/u egd uro eval cbc bmp am dc plan





Subjective


Constitutional:  Reports: weakness


Allergies:  


Coded Allergies:  


     IODINATED CONTRAST MEDIA - IV DYE (Verified  Allergy, Severe, Shortness of 

Breath, 9/16/15)


     DORIPENEM (Verified  Allergy, Intermediate, Itching, 9/16/15)


     KETOROLAC TROMETHAMINE (Verified  Allergy, Intermediate, Hives, 9/16/15)


     TRAMADOL (Verified  Allergy, Intermediate, Hives, 9/16/15)


     ASPIRIN (Verified  Allergy, Mild, 12/7/10)


All Systems:  reviewed and negative except above


Subjective


sleepy calm sl nausea / gen pain





Objective





Last 24 Hour Vital Signs








  Date Time  Temp Pulse Resp B/P Pulse Ox O2 Delivery O2 Flow Rate FiO2


 


1/26/17 12:39 97.7 81 20 114/76 96 Room Air  


 


1/26/17 09:27    132/60    


 


1/26/17 08:25 97.0 91 20 137/83 97 Room Air  


 


1/26/17 04:00 97.7 80 18 116/78 96 Room Air  


 


1/26/17 00:00 97.9 89 18 120/78 95 Room Air  


 


1/25/17 20:05 97.7 74 20 151/87 98 Room Air  


 


1/25/17 16:05 98.2 74 23 110/63 100 Room Air  

















Intake and Output  


 


 1/25/17 1/26/17





 19:00 07:00


 


Intake Total 1350 ml 1810 ml


 


Balance 1350 ml 1810 ml


 


  


 


Intake Oral 720 ml 1260 ml


 


IV Total 630 ml 550 ml


 


# Voids 2 3








Laboratory Tests


1/25/17 16:50: 


White Blood Count 5.6, Red Blood Count 4.64L, Hemoglobin 12.6L, Hematocrit 39.9L

, Mean Corpuscular Volume 86, Mean Corpuscular Hemoglobin 27.0, Mean 

Corpuscular Hemoglobin Concent 31.4L, Red Cell Distribution Width 14.7, 

Platelet Count 159, Mean Platelet Volume 7.3, Neutrophils (%) (Auto) 47.6, 

Lymphocytes (%) (Auto) 37.9, Monocytes (%) (Auto) 10.3H, Eosinophils (%) (Auto) 

2.1, Basophils (%) (Auto) 2.2H, Prothrombin Time 12.5H, Prothromb Time 

International Ratio 1.2H, Sodium Level 138, Potassium Level 4.5, Chloride Level 

99, Carbon Dioxide Level 26, Anion Gap 13, Blood Urea Nitrogen 17, Creatinine 

1.6H, Estimat Glomerular Filtration Rate 55.3, Glucose Level 117H, Calcium 

Level 9.1, Amylase Level 185H, Lipase 40


1/25/17 20:30: 


Rapid Plasma Reagin [Pending], Toxoplasma IgG Antibody [Pending], Toxoplasma 

IgM Antibody [Pending]


1/25/17 21:30: Chlamydia trachomatis RNA [Pending]


1/26/17 04:00: 


White Blood Count 3.7L, Red Blood Count 4.03L, Hemoglobin 10.8L, Hematocrit 

35.1L, Mean Corpuscular Volume 87, Mean Corpuscular Hemoglobin 26.8L, Mean 

Corpuscular Hemoglobin Concent 30.8L, Red Cell Distribution Width 15.3H, 

Platelet Count 185, Mean Platelet Volume 8.0, Neutrophils (%) (Auto) 38.7L, 

Lymphocytes (%) (Auto) 41.0, Monocytes (%) (Auto) 14.3H, Eosinophils (%) (Auto) 

4.1H, Basophils (%) (Auto) 1.9, Prothrombin Time 13.9H, Prothromb Time 

International Ratio 1.4H, Sodium Level 140, Potassium Level 4.5, Chloride Level 

101, Carbon Dioxide Level 24, Anion Gap 15, Blood Urea Nitrogen 21, Creatinine 

1.6H, Estimat Glomerular Filtration Rate 55.3, Glucose Level 120H, Calcium 

Level 8.3L, TB Test (T-Spot) [Pending], TB Test Nil Control (T-Spot) [Pending], 

TB Test Panel A (T-Spot) [Pending], TB Test Panel B (T-Spot) [Pending], TB Test 

Positive Control (T-Spot) [Pending]


Height (Feet):  6


Height (Inches):  1.00


Weight (Pounds):  239


General Appearance:  alert


EENT:  normal ENT inspection


Neck:  normal alignment


Cardiovascular:  normal peripheral pulses, normal rate, regular rhythm


Respiratory/Chest:  chest wall non-tender, lungs clear, normal breath sounds


Abdomen:  normal bowel sounds, non tender, soft


Extremities:  normal inspection


Edema:  no edema noted Arm (L), no edema noted Arm (R), no edema noted Leg (L), 

no edema noted Leg (R), no edema noted Pedal (L), no edema noted Pedal (R), no 

edema noted Generalized


Neurologic:  responsive, motor weakness


Skin:  normal pigmentation, warm/dry











TUCKER GUILLERMO Jan 26, 2017 15:25

## 2017-01-27 NOTE — DISCHARGE SUMMARY
Discharge Summary


Hospital Course


Date of Admission


Jan 20, 2017 at 22:35


Date of Discharge


Jan 26, 2017 at 20:15


Admitting Diagnosis


Hypokalemia, Hypocalcemia


KAYCEE Graves is a 51 year old male who was admitted on Jan 20, 2017 at 22:35 

for Hypokalemia,Hypocalcemia


Hospital Course


[id number]





Discharge


Discharge Disposition


Patient was discharged to Home (01)


Discharge Diagnoses:  











Roberta Alston NP Jan 27, 2017 16:53

## 2017-01-28 NOTE — DISCHARGE SUMMARY 2 SIG
DATE OF ADMISSION:  01/20/2017



DATE OF DISCHARGE:  01/26/2017



CONSULTANTS:

1. Eitan Cantu M.D.

2. Josh Cleveland M.D.

3. Fabian Cleaning M.D.

4. Sudha Light M.D.

5. Salomón Parmar M.D.



BRIEF HOSPITAL COURSE:  The patient is a 51-year-old male who

presented to ED with two days of hematemesis, abdominal pain, and

hematuria.  He had exacerbation of his chronic pain and on evaluation at

ED, potassium was 2.7 and amylase was 221.  Dr. Cleveland was consulted.

Diet was resumed.  Dr. Cleaning was also consulted for evaluation of

hypokalemia and hypernatremia. CT of the abdomen and pelvis done showed

nonobstructive left renal calculus with no hydronephrosis and presence of

IVC filter.  Dr. Cantu was consulted for evaluation of urinary tract

infection with possible pyelonephritis and was started empirically on

ceftriaxone.  Hemoglobin and hematocrit was monitored and has been stable.

GI procedures have been deferred.  Dr. Light was consulted for

evaluation of cough and was given respiratory treatments and antitussives.

The patient was tolerating low-fat diet and was given proton pump

inhibitors.  The patient was eventually discharged home.



FINAL DIAGNOSES:

1. Urinary tract infection.

2. Hypokalemia.

3. Hypocalcemia.

4. Upper gastrointestinal bleed.

5. Human immunodeficiency virus.

6. Drug-seeking behavior.

7. Acute kidney injury.

8. Gastroparesis.

9. Chronic pancreatitis.

10. Narcotic dependence.

11. Hypertension.

12. Diabetes.

13. Anemia secondary to gastrointestinal bleed.

14. Anemia of chronic disease.

15. Anemia of kidney disease.

16. Coagulopathy secondary to Coumadin use.

17. 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

filter on Coumadin.

18. History of pulmonary embolism status post inferior vena cava

filter.







  ______________________________________________

  Raimundo Zambrano D.O.



I have been assigned to dictate discharge summary on this account and I

was not involved in the patient's management.



  ______________________________________________

  Roberta Alston N.P.





DR:  Amirah

D:  01/27/2017 16:55

T:  01/28/2017 01:36

JOB#:  [id number]

CC:

## 2017-03-18 NOTE — EMERGENCY ROOM REPORT
History of Present Illness


General


Chief Complaint:  Abdominal Pain


Source:  Patient





Present Illness


HPI


Patient present with complaints of diffuse abdominal pain


Patient has had multiple workups for this pain he reports





Denies any vomiting he does feel nauseated


He states that he was not able to take his oral medications given a secondary 

nausea





Denies any fevers or chills patient reports has follow up tomorrow with his 

primary physician





Denies any back or flank pain denies any fall or trauma pain is 6/10 fairly 

diffuse however also mainly epigastric and burning in nature


Allergies:  


Coded Allergies:  


     IODINATED CONTRAST MEDIA - IV DYE (Verified  Allergy, Severe, Shortness of 

Breath, 9/16/15)


     DORIPENEM (Verified  Allergy, Intermediate, Itching, 9/16/15)


     KETOROLAC TROMETHAMINE (Verified  Allergy, Intermediate, Hives, 9/16/15)


     TRAMADOL (Verified  Allergy, Intermediate, Hives, 9/16/15)


     ASPIRIN (Verified  Allergy, Mild, 12/7/10)





Patient History


Past Medical History:  see triage record


Pertinent Family History:  none


Reviewed Nursing Documentation:  PMH: Agreed, PSxH: Agreed





Nursing Documentation-PMH


Past Medical History:  No History, Except For


Hx Cardiac Problems:  Yes - CHF, A-fib


Hx Hypertension:  Yes


Hx Pacemaker:  No - PE


Hx COPD:  No - PE, IVC filter


Hx Diabetes:  Yes


Hx Cancer:  No


Hx Gastrointestinal Problems:  Yes - Pancreatitis, Gasis Paresis


Hx Dialysis:  No - DIVERTICULITIS


Hx Neurological Problems:  Yes


Hx Concentration Difficulty:  Yes


Hx Dizziness:  Yes


Hx Syncope:  Yes


Hx Weakness:  Yes


Hx Neurologic Surgery:  No





Review of Systems


All Other Systems:  negative except mentioned in HPI





Physical Exam





Vital Signs








  Date Time  Temp Pulse Resp B/P Pulse Ox O2 Delivery O2 Flow Rate FiO2


 


3/16/17 19:46 97.9 70 16 147/77 100 Room Air  








Sp02 EP Interpretation:  reviewed, normal


General Appearance:  well appearing, no apparent distress


Head:  normocephalic, atraumatic


Eyes:  bilateral eye EOMI, bilateral eye PERRL


ENT:  hearing grossly normal, normal pharynx, TMs + canals normal, uvula midline


Neck:  full range of motion, supple, no meningismus, no bony tend


Respiratory:  lungs clear, normal breath sounds, no rhonchi, no respiratory 

distress, no retraction, no accessory muscle use


Cardiovascular #1:  normal peripheral pulses, regular rate, rhythm, no edema, 

no gallop, no JVD, no murmur


Gastrointestinal:  normal bowel sounds, non tender - Nontender on palpation 

however subjectively pointing diffusely, also epigastric , soft, no mass, no 

organomegaly, non-distended, no guarding, no hernia, no pulsatile mass, no 

rebound


Genitourinary:  no CVA tenderness


Neurologic:  oriented x3, responsive, CNs III-XII nml as tested, motor strength/

tone normal, sensory intact


Psychiatric:  mood/affect normal


Skin:  normal color, no rash, warm/dry, palpation normal


Lymphatic:  normal inspection, no adenopathy





Medical Decision Making


Diagnostic Impression:  


 Primary Impression:  


 Abdominal pain


ER Course


Multiple differentials considered including but not limited to pancreatitis, 

cholecystitis, appendicitis





Patient is a fairly soft abdomen on exam


Patient has had multiple imaging previously on review





Baseline blood work was initiated which is appropriate patient's pain was 

treated here in the ER and stable for close outpatient followup





Labs








Test


  3/16/17


20:51


 


White Blood Count


  5.4 K/UL


(4.8-10.8)


 


Red Blood Count


  4.21 M/UL


(4.70-6.10)


 


Hemoglobin


  11.2 G/DL


(14.2-18.0)


 


Hematocrit


  36.8 %


(42.0-52.0)


 


Mean Corpuscular Volume 87 FL (80-99) 


 


Mean Corpuscular Hemoglobin


  26.6 PG


(27.0-31.0)


 


Mean Corpuscular Hemoglobin


Concent 30.4 G/DL


(32.0-36.0)


 


Red Cell Distribution Width


  16.2 %


(11.6-14.8)


 


Platelet Count


  149 K/UL


(150-450)


 


Mean Platelet Volume


  7.5 FL


(6.5-10.1)


 


Neutrophils (%) (Auto)


  43.0 %


(45.0-75.0)


 


Lymphocytes (%) (Auto)


  44.3 %


(20.0-45.0)


 


Monocytes (%) (Auto)


  9.2 %


(1.0-10.0)


 


Eosinophils (%) (Auto)


  2.1 %


(0.0-3.0)


 


Basophils (%) (Auto)


  1.5 %


(0.0-2.0)


 


Sodium Level


  144 mEQ/L


(135-145)


 


Potassium Level


  4.0 mEQ/L


(3.4-4.9)


 


Chloride Level


  105 mEQ/L


()


 


Carbon Dioxide Level


  25 mEQ/L


(20-30)


 


Anion Gap 14 (5-15) 


 


Blood Urea Nitrogen


  15 mg/dL


(7-23)


 


Creatinine


  1.5 mg/dL


(0.7-1.2)


 


Estimat Glomerular Filtration


Rate 59.5 mL/min


(>60)


 


Glucose Level


  96 mg/dL


()


 


Calcium Level


  9.1 mg/dL


(8.6-10.2)


 


Total Bilirubin


  0.2 mg/dL


(0.0-1.2)


 


Aspartate Amino Transf


(AST/SGOT) 23 U/L (5-40) 


 


 


Alanine Aminotransferase


(ALT/SGPT) 16 U/L (3-41) 


 


 


Alkaline Phosphatase


  87 U/L


()


 


Total Protein


  8.1 g/dL


(6.6-8.7)


 


Albumin


  4.1 g/dL


(3.5-5.2)


 


Globulin 4.0 g/dL 


 


Albumin/Globulin Ratio 1.0 (1.0-2.7) 


 


Lipase 66 U/L (< 60) 











Last Vital Signs








  Date Time  Temp Pulse Resp B/P Pulse Ox O2 Delivery O2 Flow Rate FiO2


 


3/16/17 21:58 97.9 82 17 112/79 99 Room Air  








Status:  improved


Disposition:  HOME, SELF-CARE


Condition:  Improved


Referrals:  


NON PHYSICIAN (PCP)


Patient Instructions:  Abdominal Pain, Adult





Additional Instructions:  


Patient is provided with the discharge instructions notified to follow up with 

primary doctor in the next 2-3 days otherwise return to the er with any 

worsening symptoms.


Please note that this report is being documented using DRAGON technology.  This 

can lead to erroneous entry secondary to incorrect interpretation by the 

dictating instrument.











VENECIA MOTLEY D.O. Mar 18, 2017 08:14

## 2017-05-12 NOTE — EMERGENCY ROOM REPORT
History of Present Illness


General


Chief Complaint:  Abdominal Pain


Source:  Patient





Present Illness


HPI


The patient is a 51-year-old male with a history of pancreatitis and multiple 

upper GI bleeds presenting for abdominal pain and hematemesis for the past 2 

days.  He states that this feels the same as past presentations.  He has tried 

Zofran at home which has not helped.  Patient states the vomit looked like 

coffee grounds yesterday but today he is coughing up bright red blood.  Pain of 

the abdomen is described as a 10 out of 10 dull ache across the upper abdomen.  

Pain worse with touch and vomiting.  He denies other symptoms including fever, 

chills, chest pain, shortness of breath, back pain, diarrhea, constipation, 

melena, hematochezia, fatigue, dizziness


Allergies:  


Coded Allergies:  


     IODINATED CONTRAST MEDIA - IV DYE (Verified  Allergy, Severe, Shortness of 

Breath, 9/16/15)


     DORIPENEM (Verified  Allergy, Intermediate, Itching, 9/16/15)


     KETOROLAC TROMETHAMINE (Verified  Allergy, Intermediate, Hives, 9/16/15)


     TRAMADOL (Verified  Allergy, Intermediate, Hives, 9/16/15)


     ASPIRIN (Verified  Allergy, Mild, 12/7/10)





Patient History


Past Medical History:  see triage record


Pertinent Family History:  none


Reviewed Nursing Documentation:  PMH: Agreed, PSxH: Agreed





Nursing Documentation-PMH


Past Medical History:  No History, Except For


Hx Cardiac Problems:  Yes - CHF, A-fib


Hx Hypertension:  Yes


Hx Pacemaker:  No - PE


Hx COPD:  No - PE, IVC filter


Hx Diabetes:  Yes


Hx Cancer:  No


Hx Gastrointestinal Problems:  Yes


Hx Dialysis:  No - DIVERTICULITIS


Hx Neurological Problems:  No


Hx Concentration Difficulty:  Yes


Hx Dizziness:  Yes


Hx Syncope:  Yes


Hx Weakness:  Yes


Hx Neurologic Surgery:  No





Review of Systems


All Other Systems:  negative except mentioned in HPI





Physical Exam





Vital Signs








  Date Time  Temp Pulse Resp B/P Pulse Ox O2 Delivery O2 Flow Rate FiO2


 


5/12/17 18:07 97.7 92 22 158/81 97 Room Air  








Sp02 EP Interpretation:  reviewed, normal


General Appearance:  no apparent distress, alert, GCS 15, non-toxic


Head:  normocephalic, atraumatic


Eyes:  bilateral eye PERRL, bilateral eye normal inspection


ENT:  hearing grossly normal, normal pharynx, no angioedema, normal voice


Neck:  full range of motion, supple/symm/no masses


Respiratory:  chest non-tender, lungs clear, normal breath sounds, speaking 

full sentences


Cardiovascular #1:  regular rate, rhythm, no edema


Gastrointestinal:  normal bowel sounds, soft, tenderness - RUQ, epigastric, and 

LUQ, other - surgical scar mid abdomen


Rectal:  deferred


Genitourinary:  normal inspection, no CVA tenderness


Musculoskeletal:  back normal, gait/station normal, normal range of motion, non-

tender


Neurologic:  alert, oriented x3, responsive, motor strength/tone normal, 

sensory intact, speech normal


Psychiatric:  judgement/insight normal, memory normal, mood/affect normal, no 

suicidal/homicidal ideation


Skin:  normal color, no rash, warm/dry, well hydrated


Lymphatic:  no adenopathy





Medical Decision Making


PA Attestation


Dr. Avila is my supervising physician. Patient management was discussed with 

my supervising physician


Diagnostic Impression:  


 Primary Impression:  


 hemoptysis


 Additional Impressions:  


 pancreatitis


 UTI (urinary tract infection)


 Qualified Codes:  N39.0 - Urinary tract infection, site not specified; R31.9 - 

Hematuria, unspecified


 Hematuria


ER Course


The patient is a 51-year-old male with a history of pancreatitis and multiple 

upper GI bleeds presenting for abdominal pain and hematemesis





Differential diagnoses considered but not limited to: Acute GI bleed, 

pancreatitis, appendicitis, diverticulitis, gastric ulcer, appendicitis, 

urinary tract infection, among others





Physical exam: Vitals show hypertension.  Afebrile.


The patient is in no apparent distress


Abdomen is soft.  Normal bowel sounds.  There is tenderness to palpation across 

the right upper, epigastric, and left upper quadrants.  There is a midline 

surgical scar.


No McBurney point tenderness.


Lungs are clear to auscultation bilaterally





Lab work shows no leukocytosis and only minimally elevated amylase.  Mild 

anemia.  Lipase within normal limits





The patient is given IV fluids, Zofran, and morphine for pain.  The patient is 

feeling better.  He is also given IV antibiotics for possible urinary tract 

infection.





She will be admitted to this hospital in stable condition.





Dr. Avila has spoken with Dr. Guillermo regarding admission





Laboratory Tests








Test


  5/12/17


18:20 5/12/17


19:04


 


Urine Color Yellow   


 


Urine Appearance


  Slightly


cloudy 


 


 


Urine pH 5 (4.5-8.0)   


 


Urine Specific Gravity


  1.025


(1.005-1.035) 


 


 


Urine Protein


  2+ (NEGATIVE)


H 


 


 


Urine Glucose (UA)


  Negative


(NEGATIVE) 


 


 


Urine Ketones


  1+ (NEGATIVE)


H 


 


 


Urine Occult Blood


  5+ (NEGATIVE)


H 


 


 


Urine Nitrite


  Negative


(NEGATIVE) 


 


 


Urine Bilirubin


  Negative


(NEGATIVE) 


 


 


Urine Urobilinogen


  1 MG/DL


(0.0-1.0)  H 


 


 


Urine Leukocyte Esterase


  1+ (NEGATIVE)


H 


 


 


Urine RBC


  Tntc /HPF (0 -


0)  H 


 


 


Urine WBC


  5-10 /HPF (0 -


0)  H 


 


 


Urine Squamous Epithelial


Cells Many /LPF


(NONE/OCC)  H 


 


 


Urine Bacteria


  Few /HPF


(NONE) 


 


 


Urine Mucus


  Many /LPF


(NONE/OCC)  H 


 


 


White Blood Count


  


  3.9 K/UL


(4.8-10.8)  L


 


Red Blood Count


  


  3.74 M/UL


(4.70-6.10)  L


 


Hemoglobin


  


  11.0 G/DL


(14.2-18.0)  L


 


Hematocrit


  


  33.2 %


(42.0-52.0)  L


 


Mean Corpuscular Volume  89 FL (80-99)  


 


Mean Corpuscular Hemoglobin


  


  29.3 PG


(27.0-31.0)


 


Mean Corpuscular Hemoglobin


Concent 


  33.0 G/DL


(32.0-36.0)


 


Red Cell Distribution Width


  


  15.4 %


(11.6-14.8)  H


 


Platelet Count


  


  85 K/UL


(150-450)  L


 


Mean Platelet Volume


  


  8.4 FL


(6.5-10.1)


 


Neutrophils (%) (Auto)


  


  39.4 %


(45.0-75.0)  L


 


Lymphocytes (%) (Auto)


  


  45.5 %


(20.0-45.0)  H


 


Monocytes (%) (Auto)


  


  10.1 %


(1.0-10.0)  H


 


Eosinophils (%) (Auto)


  


  3.0 %


(0.0-3.0)


 


Basophils (%) (Auto)


  


  2.0 %


(0.0-2.0)


 


Sodium Level


  


  143 mEQ/L


(135-145)


 


Potassium Level


  


  3.7 mEQ/L


(3.4-4.9)


 


Chloride Level


  


  103 mEQ/L


()


 


Carbon Dioxide Level


  


  26 mEQ/L


(20-30)


 


Anion Gap  14 (5-15)  


 


Blood Urea Nitrogen


  


  17 mg/dL


(7-23)


 


Creatinine


  


  1.4 mg/dL


(0.7-1.2)  H


 


Estimate Glomerular


Filtration Rate 


  > 60 mL/min


(>60)


 


Glucose Level


  


  105 mg/dL


()


 


Calcium Level


  


  8.7 mg/dL


(8.6-10.2)


 


Total Bilirubin


  


  < 0.2 mg/dL


(0.0-1.2)


 


Aspartate Amino Transferase


(AST) 


  28 U/L (5-40)  


 


 


Alanine Aminotransferase (ALT)  24 U/L (3-41)  


 


Alkaline Phosphatase


  


  81 U/L


()


 


Total Protein


  


  7.4 g/dL


(6.6-8.7)


 


Albumin


  


  4.0 g/dL


(3.5-5.2)


 


Globulin  3.4 g/dL  


 


Albumin/Globulin Ratio  1.1 (1.0-2.7)  


 


Amylase Level


  


  136 U/L


()  H


 


Lipase  45 U/L (< 60)  








Lab Results Impression


CBC shows no leukocytosis.  Mild anemia.


CMP has mildly elevated creatinine.  Otherwise unremarkable.


Amylase is elevated but not consistent with acute pancreatitis.  Lipase within 

normal limits.


Urinalysis shows 5 + occult blood with red blood cells.  There are also white 

blood cells and bacteria


Chest X-Ray Diagnostic Results


EP Interpretation:  Yes


Findings:  no consolidation, no effusion, no pneumothorax, no acute 

cardiopulmonary disease


Number of Views:  1


PA Scribe Text


I am acting as scribe for my supervising physician. My supervising physician's 

interpretation of the chest xrays are there is no consolidation, no effusion, 

no acute cardiopulmonary disease, no pneumothorax





Last Vital Signs








  Date Time  Temp Pulse Resp B/P Pulse Ox O2 Delivery O2 Flow Rate FiO2


 


5/12/17 22:00 98.2 63 18 147/91 100 Room Air  








Status:  improved


Disposition:  ADMITTED AS INPATIENT


Condition:  Stable


Referrals:  


TUCKER GUILLERMO (PCP)











JUAN JOSE RAMOS May 12, 2017 22:35

## 2017-05-13 NOTE — CONSULTATION
DATE OF CONSULTATION:  05/13/2017



INFECTIOUS DISEASE CONSULTATION



CONSULTING PHYSICIAN:  Eitan Cantu M.D.



REQUESTING PHYSICIAN:  Raimundo Zambrano D.O.



REASON FOR CONSULTATION:  Possible HIV infection and UTI.

Recommendation for antibiotics therapy.



HISTORY OF PRESENT ILLNESS:  The patient is a 51-year-old male with

past medical history of atrial fibrillation, CHF, hypertension, diabetes,

and GERD, who was brought in to [facility] for upper GI

bleeding and abdominal pain for two days.  The patient said that his

abdominal pain has been localized in the lower aspect of his abdomen

mainly in the suprapubic area.  The intensity is 6/10, radiates to the

back.  He also complained of left-sided flank pain radiate all the way to

the groin area.  He noticed some blood in his urine.  He did not have any

associated factor or any exacerbating factor that he can be aware of.  The

patient had laboratory tests done in the emergency room including

urinalysis, which showed evidence of urinary tract infection.  So, he was

given Cipro admitted to the hospital and I was asked by the primary

provider for antibiotics treatment and to rule out HIV infection since his

antibody screening were positive last time when he was hospitalized in

January.



PAST MEDICAL HISTORY:  Significant for CHF, atrial fibrillation,

hypertension, diabetes, GERD, dizziness and syncope.



PAST SURGICAL HISTORY:  Negative.



ALLERGIES:  He is allergic to aspirin, tramadol, ketorolac,

doripenem, and iodinated contrast media.



MEDICATIONS:  He is on Flomax, Benadryl, Coreg, Vasotec, Protonix,

morphine sulfate, Tylenol, MiraLax, Zofran, Restoril, and Mylanta.



SOCIAL HISTORY:  He lives with his wife and kids and unemployed at

this moment.  No recent drugs, tobacco, or alcohol.



PHYSICAL EXAMINATION:

VITAL SIGNS:  Temperature 97.4 degrees, pulse 78, respirations 20,

blood pressure 136/71, and saturation 98% on room air.

GENERAL:  The patient is a middle-aged male, up in bed, awake, alert,

not in distress.

HEENT:  Normocephalic and atraumatic.  Pupils are reactive to light.

Moist oral mucosa.  No exudate.

NECK:  Supple.  No lymphadenopathy.

CARDIOVASCULAR:  Regular rate and rhythm.  No murmur or gallop.

LUNGS:  Clear bilaterally.  No wheezing or rhonchi.

ABDOMEN:  Soft.  Tender in the flank area and the suprapubic area.

No rebound.  No organomegaly.

EXTREMITIES:  No edema.  No cyanosis.

SKIN:  No rash.  No breakdown.



LABORATORY DATA:  Showed white count of 3.6 and hemoglobin of 11.3.

Creatinine is 1.4.  Urinalysis showed evidence of infection with few

bacteria and positive leukocyte esterase.



IMAGING STUDIES:  None in the system yet reported.



ASSESSMENT AND PLAN:

1. Urinary tract infection with hematuria rule out obstructive uropathy.

We will order ultrasound of the abdomen to rule out kidney stone.

Recommend urologist consultation for further evaluation.  We will start

the patient on ceftriaxone empirically and send urine culture.

2. Positive screening for human immunodeficiency virus with positive

antibody.  We will order viral load and CD4 count to confirm.  The patient

was counseled regarding safe sex practice.  He required to notify his wife

of his condition if it turns out to be really positive human

immunodeficiency virus disease.

3. Gastrointestinal bleeding.  Monitor hemoglobin and hematocrit.

Transfuse blood as needed.  Further recommendation as per

gastrointestinal.









  ______________________________________________

  Eitan Cantu M.D.





DR:  MAURO

D:  05/13/2017 14:26

T:  05/13/2017 21:52

JOB#:  [id number]

CC:

## 2017-05-13 NOTE — HISTORY AND PHYSICAL REPORT
DATE OF ADMISSION:  05/12/2017



TIME SEEN:  At 8 a.m.



ATTENDING PHYSICIAN:  Raimundo Zambrano D.O.



CONSULTANTS:

1. Sudha Light M.D.

2. Josh Cleveland M.D.

3. Behnoush Zarrini, M.D.



CHIEF COMPLAINT:  Nausea, vomiting, abdominal pain, and

hematemesis.



HISTORY OF PRESENT ILLNESS:  The patient is a 51-year-old male who

presents to [place] ER last night with a history of nausea, vomiting, and

abdominal pain with hematemesis.  He does have a history of gastroparesis

and has been in multiple times for the same problem.  The patient was

admitted to the medical floor, currently slightly anxious in bed, oriented

x3, in no acute distress.



PAST MEDICAL HISTORY:  Hypertension, CHF, DVT, and gastroparesis.



PAST SURGICAL HISTORY:  Stomach surgery.



ALLERGIES:  IV contrast, Vicodin, tramadol, and Toradol.



MEDICATIONS:

1. Flomax.

2. Coreg.

3. Vasotec.

4. Protonix.

5. Morphine.

6. Tylenol.

7. MiraLAX.

8. Zofran.

9. Restoril.

10. Benadryl.

11. Nitroglycerin.



SOCIAL HISTORY:  No smoking, no alcohol, and no intravenous drug

abuse.



FAMILY HISTORY:  Noncontributory.



PHYSICAL EXAMINATION:

GENERAL:  Anxious in bed, oriented x3, and in no acute distress.

VITAL SIGNS:  Temperature 97, pulse 85, respirations 20, and blood

pressure 117/84.

CARDIOVASCULAR:  No murmur.

LUNGS:  Distant and clear.

ABDOMEN:  Bowel sounds are positive.  Nontender and nondistended.

EXTREMITIES:  No clubbing, cyanosis, or edema.

NEUROLOGIC:  Cranial nerves II through XII are grossly intact.  Deep

tendon reflexes 2+.  Muscle strength 4/5.



LABORATORY DATA:  White count 3.9, hemoglobin and hematocrit 11 and

33, and platelets 85,000.  BMP shows creatinine of 1.4 and amylase of 136,

otherwise normal.  Urinalysis shows 1+ ketones, 5+ blood, 2+ protein, and

1+ leukocyte esterase.



ASSESSMENT:

1. Nausea.

2. Vomiting.

3. Abdominal pain.

4. Hematemesis.

5. Hypertension.

6. Congestive heart failure.

7. Urinary tract infection.

8. Pancytopenia.

9. Deep venous thrombosis.

10. Gastroparesis.



PLAN:

1. Continue premeds.

2. Antibiotics per Infectious Disease.

3. OT/PT.

4. Dietary evaluation.

5. IV fluids.

6. GI followup.

7. Pain control.

8. We will continue to follow up with the patient.



Dr. Light, Dr. Cleveland, Dr. Peralta, and  _____ to consult.









  ______________________________________________

  Raimundo Zambrano D.O.





DR:  ARBEN

D:  05/13/2017 08:33

T:  05/13/2017 19:28

JOB#:  [id number]

CC:

## 2017-05-13 NOTE — CONSULTATION
History of Present Illness


General


Chief Complaint:  Abdominal Pain





Present Illness


Allergies:  


Coded Allergies:  


     IODINATED CONTRAST MEDIA - IV DYE (Verified  Allergy, Severe, Shortness of 

Breath, 9/16/15)


     DORIPENEM (Verified  Allergy, Intermediate, Itching, 9/16/15)


     KETOROLAC TROMETHAMINE (Verified  Allergy, Intermediate, Hives, 9/16/15)


     TRAMADOL (Verified  Allergy, Intermediate, Hives, 9/16/15)


     ASPIRIN (Verified  Allergy, Mild, 12/7/10)





Medication History


Scheduled


Carvedilol (Coreg), 6.25 MG ORAL DAILY, (Reported)


Enalapril Maleate* (Vasotec*), 10 MG ORAL DAILY, (Reported)


Isosorbide Mononitrate* (Imdur*), 60 MG ORAL DAILY, (Reported)


Tamsulosin Hcl (Tamsulosin Hcl*), Unknown Dose ORAL BEDTIME, (Reported)


Terazosin HCl (Terazosin HCl), 10 MG PO QHS, (Reported)


Warfarin Sod* (Coumadin*), 5 MG ORAL DAILY, (Reported)





Scheduled PRN


Ondansetron (Zofran), 4 MG ORAL Q8H PRN, (Reported)





Discontinued Medications


Aspirin* (Aspir 81*), 81 MG ORAL DAILY, (Reported)


   Discontinued Reason: Therapy completed


Esomeprazole Magnesium (Nexium), 40 MG ORAL DAILY, (Reported)


   Discontinued Reason: Therapy completed


Ferrous Sulfate* (Ferrous Sulfate*), 325 MG ORAL DAILY, (Reported)


   Discontinued Reason: Therapy completed


Gabapentin (Neurontin), 300 MG ORAL THREE TIMES A DAY, (Reported)


   Discontinued Reason: Therapy completed


Hydrocodone/Acetaminophen (Hydrocodon-Acetaminophn ), 1 TAB ORAL Q4H PRN 

for For Pain, (Reported)


   Discontinued Reason: Therapy completed


Lactobacillus Rhamnosus Gg* (Culturelle*), 1 CAP ORAL THREE TIMES A DAY, (

Reported)


   Discontinued Reason: Therapy completed


Metoclopramide Hcl* (Reglan*), 10 MG PO TID PRN, (Reported)


   Discontinued Reason: Therapy completed


Metronidazole* (Flagyl*), 500 MG ORAL EVERY 8 HOURS, (Reported)


   Discontinued Reason: Therapy completed


Nitroglycerin (Nitrostat), 0.4 MG SL, (Reported)


   Discontinued Reason: Therapy completed


Sucralfate* (Carafate*), 1 GM ORAL FOUR TIMES A DAY, (Reported)


   Discontinued Reason: Therapy completed





Patient History


Healthcare decision maker





Resuscitation status





Advanced Directive on File








Physical Exam





Last 24 Hour Vital Signs








  Date Time  Temp Pulse Resp B/P Pulse Ox O2 Delivery O2 Flow Rate FiO2


 


5/13/17 20:00 97.9 87 20 106/74 95 Room Air  


 


5/13/17 15:55 98.0 79 20 146/75 95 Room Air  


 


5/13/17 12:07 97.4 78 20 136/71 98 Room Air  


 


5/13/17 09:27    117/84    


 


5/13/17 09:27  85  117/84    


 


5/13/17 08:25 97.4 85 20 117/84 98 Room Air  


 


5/13/17 06:05 97.9       


 


5/13/17 04:00 96.8 79 20 128/80 98 Room Air  


 


5/13/17 00:00 97.9 68 18 138/89 100 Room Air  

















Intake and Output  


 


 5/12/17 5/13/17





 19:00 07:00


 


Intake Total 0 ml 1200 ml


 


Balance 0 ml 1200 ml


 


  


 


Intake Oral 0 ml 


 


IV Total  1200 ml


 


# Voids  2











Laboratory Tests








Test


  5/13/17


00:00 5/13/17


09:00


 


HIV-1 Antibody Pending   


 


HIV-2 Antibody Pending   


 


White Blood Count


  


  3.6 K/UL


(4.8-10.8)  L


 


Red Blood Count


  


  4.09 M/UL


(4.70-6.10)  L


 


Hemoglobin


  


  11.3 G/DL


(14.2-18.0)  L


 


Hematocrit


  


  35.3 %


(42.0-52.0)  L


 


Mean Corpuscular Volume  86 FL (80-99)  


 


Mean Corpuscular Hemoglobin


  


  27.7 PG


(27.0-31.0)


 


Mean Corpuscular Hemoglobin


Concent 


  32.1 G/DL


(32.0-36.0)


 


Red Cell Distribution Width


  


  14.9 %


(11.6-14.8)  H


 


Platelet Count


  


  100 K/UL


(150-450)  L


 


Mean Platelet Volume


  


  8.3 FL


(6.5-10.1)


 


Neutrophils (%) (Auto)


  


  36.3 %


(45.0-75.0)  L


 


Lymphocytes (%) (Auto)


  


  49.2 %


(20.0-45.0)  H


 


Monocytes (%) (Auto)


  


  9.4 %


(1.0-10.0)


 


Eosinophils (%) (Auto)


  


  3.7 %


(0.0-3.0)  H


 


Basophils (%) (Auto)


  


  1.5 %


(0.0-2.0)


 


Prothrombin Time


  


  15.4 SEC


(9.30-11.50)  H


 


Prothromb Time International


Ratio 


  1.5 (0.9-1.1)


H


 


Activated Partial


Thromboplast Time 


  27 SEC (23-33)


 


 


D-Dimer


  


  1792 ng/mL


(<500)  H


 


Sodium Level


  


  142 mEQ/L


(135-145)


 


Potassium Level


  


  3.9 mEQ/L


(3.4-4.9)


 


Chloride Level


  


  102 mEQ/L


()


 


Carbon Dioxide Level


  


  25 mEQ/L


(20-30)


 


Anion Gap  15 (5-15)  


 


Blood Urea Nitrogen


  


  14 mg/dL


(7-23)


 


Creatinine


  


  1.4 mg/dL


(0.7-1.2)  H


 


Estimat Glomerular Filtration


Rate 


  > 60 mL/min


(>60)


 


Glucose Level


  


  122 mg/dL


()  H


 


Calcium Level


  


  8.8 mg/dL


(8.6-10.2)


 


Ferritin


  


  222 ng/mL


()


 


Total Bilirubin


  


  0.5 mg/dL


(0.0-1.2)


 


Aspartate Amino Transf


(AST/SGOT) 


  28 U/L (5-40)  


 


 


Alanine Aminotransferase


(ALT/SGPT) 


  22 U/L (3-41)  


 


 


Alkaline Phosphatase


  


  78 U/L


()


 


Total Protein


  


  7.5 g/dL


(6.6-8.7)


 


Albumin


  


  4.0 g/dL


(3.5-5.2)


 


Globulin  3.5 g/dL  


 


Albumin/Globulin Ratio  1.1 (1.0-2.7)  


 


Amylase Level


  


  113 U/L


()  H


 


Lipase  26 U/L (< 60)  


 


Carcinoembryonic Antigen  3.9 ng/mL  H


 


CA 19-9 Antigen


  


  0.600 U/mL (<


37)


 


Prostate Specific Antigen


  


  1.1 ng/mL (<


3.5)


 


HIV (1&2) Antibody Rapid


  


  Preliminary


positive








Height (Feet):  6


Height (Inches):  2.00


Weight (Pounds):  226


Medications





Current Medications








 Medications


  (Trade)  Dose


 Ordered  Sig/Kirsten


 Route


 PRN Reason  Start Time


 Stop Time Status Last Admin


Dose Admin


 


 Acetaminophen


  (Tylenol)  650 mg  Q4H  PRN


 ORAL


 fever  5/12/17 23:15


 6/11/17 23:14   


 


 


 Al Hydroxide/Mg


 Hydroxide


  (Mylanta II)  30 ml  Q6H  PRN


 ORAL


 dyspepsia  5/12/17 23:15


 6/11/17 23:14   


 


 


 Carvedilol


  (Coreg)  6.25 mg  DAILY


 ORAL


   5/13/17 09:00


 6/12/17 08:59  5/13/17 09:27


 


 


 Ceftriaxone


 Sodium/Dextrose


  (Rocephin/D5W)  55 ml @ 


 110 mls/hr  Q24H


 IVPB


   5/13/17 15:00


 5/20/17 14:59  5/13/17 15:42


 


 


 Dextrose     STAT  PRN


 IV


 Hypoglycemia  5/12/17 23:15


 6/11/17 23:14   


 


 


 Dextrose/Sodium


 Chloride


  (D5ns)  1,000 ml @ 


 100 mls/hr  Q10H


 IV


   5/13/17 00:00


 6/12/17 00:00  5/13/17 13:45


 


 


 Diphenhydramine


 HCl 25 mg  25 mg  Q6H  PRN


 IVP


 Itching  5/13/17 12:00


 6/12/17 11:59  5/13/17 22:15


 


 


 Enalapril Maleate


  (Vasotec)  10 mg  DAILY


 ORAL


   5/13/17 09:00


 6/12/17 08:59  5/13/17 09:27


 


 


 Morphine Sulfate


  (Morphine


 Sulfate)  4 mg  Q4H  PRN


 IVP


 For Pain  5/13/17 01:00


 5/20/17 00:59  5/13/17 22:17


 


 


 Nitroglycerin


  (Ntg)  0.4 mg  Q5M X 3 DOSES PRN


 SL


 Prn Chest Pain  5/12/17 23:15


 6/11/17 23:14   


 


 


 Ondansetron HCl


  (Zofran)  4 mg  Q6H  PRN


 IVP


 Nausea & Vomiting  5/12/17 23:15


 6/11/17 23:14  5/13/17 22:15


 


 


 Pantoprazole


  (Protonix)  40 mg  DAILY


 ORAL


   5/13/17 09:00


 6/12/17 08:59  5/13/17 09:26


 


 


 Polyethylene


 Glycol


  (Miralax)  17 gm  HSPRN  PRN


 ORAL


 Constipation  5/12/17 23:15


 6/11/17 23:14   


 


 


 Tamsulosin HCl


  (Flomax)  0.4 mg  BEDTIME


 ORAL


   5/13/17 21:00


 6/12/17 20:59   


 


 


 Temazepam


  (Restoril)  15 mg  HSPRN  PRN


 ORAL


 Insomnia  5/12/17 23:15


 5/19/17 23:14   


 

















CHAVO DUNBAR May 13, 2017 22:44

## 2017-05-13 NOTE — CONSULTATION
DATE OF CONSULTATION:  05/13/2017



CHIEF COMPLAINT:  Hematemesis and abdominal pain.



HISTORY OF PRESENT ILLNESS:  The patient is an unfortunate 

[place] male 51 years old admitted multiple time here at [facility]

and other hospitals.  He has been having chronic abdominal pain and

chronic pain medication dependency followed by Dr. Peralta for pain

management, came to the hospital with one-day worth of nausea, vomiting,

hematemesis, and abdominal pain.



PAST MEDICAL HISTORY:

1. Significant for history of DVT status post IVC filter placement.

2. Hypercoagulable state.

3. History of pancreatitis.

4. Prior history of abdominal surgeries for possibly small bowel

obstruction.

5. Diverticulosis.

6. Hiatal hernia.

7. Anemia.

8. Gastritis.

9. H. pylori negative.



PAST SURGICAL HISTORY:  Possible abdominal surgery for small bowel

obstruction.



ALLERGIES:  To aspirin, doripenem, and iodine.



MEDICATIONS:  Please see medication reconciliation list.



SOCIAL HISTORY:  The patient denies any tobacco, alcohol, or drug

abuse.



FAMILY HISTORY:  Noncontributory.



REVIEW OF SYSTEMS:  A 10-point review of systems was performed,

pertinent positives in history of present illness.



PHYSICAL EXAMINATION:

VITAL SIGNS:  Temperature is 97.9 degrees, pulse 79, respirations 20,

and blood pressure 120/80.

HEENT:  Normocephalic and atraumatic.

NECK:  Supple.  No adenopathy.

CARDIOVASCULAR:  Regular rate and rhythm.  Plus S1 and S2.

LUNGS:  Clear to auscultation bilaterally.

ABDOMEN:  Minimal tenderness to palpation in the epigastric area.  No

rebound.  No guarding. No peritoneal sign.  There is a scar in the midline

from prior abdominal surgeries.

EXTREMITIES:  No cyanosis, no clubbing, no edema.



LABORATORY DATA:  Sodium 142, potassium 3.7, BUN 17, and creatinine

1.4.  White count is 3.9, hemoglobin 11, hematocrit 33, and platelet count

85,000.



ASSESSMENT AND PLAN:  This is a 51-year-male with abdominal pain,

history of hypercoagulable state, and inferior vena cava filter placement

and multiple admissions for abdominal pain.  At this time, we are going to

advance his diet to clear liquid diet.  Start him on Protonix for

gastrointestinal prophylaxis.  Repeat CBC for tomorrow and control pain.

Repeat amylase and lipase for tomorrow.  Hold gastrointestinal procedure

at this time.



I want to thank Dr. Raimundo Zambrano for this kind referral.









  ______________________________________________

  Josh Cleevland M.D.





DR:  LLOYD

D:  05/13/2017 08:27

T:  05/13/2017 20:14

JOB#:  [id number]

CC:  Raimundo Zambrano D.O.

## 2017-05-13 NOTE — INFECTIOUS DISEASES PROG NOTE
Assessment/Plan


Problems:  


(1) UTI (urinary tract infection)


Assessment & Plan:  will send urine culture and start ceftriaxon empirically , 

and order renal US to rule out stone





(2) HIV (human immunodeficiency virus infection)


Assessment & Plan:  will order viral load and CD4 to confirm , and genotype , 

patient was counseled regarding safe sex practice, and he is required to notify 

his wife of his HIV status if confirmed positive 





(3) GI bleeding


Assessment & Plan:  monitor H/H , transfuse blood as needed, Gi is following 








Subjective


Allergies:  


Coded Allergies:  


     IODINATED CONTRAST MEDIA - IV DYE (Verified  Allergy, Severe, Shortness of 

Breath, 9/16/15)


     DORIPENEM (Verified  Allergy, Intermediate, Itching, 9/16/15)


     KETOROLAC TROMETHAMINE (Verified  Allergy, Intermediate, Hives, 9/16/15)


     TRAMADOL (Verified  Allergy, Intermediate, Hives, 9/16/15)


     ASPIRIN (Verified  Allergy, Mild, 12/7/10)





Objective


Vital Signs





Last 24 Hour Vital Signs








  Date Time  Temp Pulse Resp B/P Pulse Ox O2 Delivery O2 Flow Rate FiO2


 


5/13/17 12:07 97.4 78 20 136/71 98 Room Air  


 


5/13/17 09:27    117/84    


 


5/13/17 09:27  85  117/84    


 


5/13/17 08:25 97.4 85 20 117/84 98 Room Air  


 


5/13/17 06:05 97.9       


 


5/13/17 04:00 96.8 79 20 128/80 98 Room Air  


 


5/13/17 00:00 97.9 68 18 138/89 100 Room Air  


 


5/12/17 22:00 98.2 63 18 147/91 100 Room Air  


 


5/12/17 21:08 97.7 70 16 153/83 100 Room Air  


 


5/12/17 20:16 97.7 70 16 153/83 100 Room Air  


 


5/12/17 19:59 97.7       


 


5/12/17 18:17 97.7  22 158/81 97 Room Air  


 


5/12/17 18:07 97.7 92 22 158/81 97 Room Air  








Height (Feet):  6


Height (Inches):  2.00


Weight (Pounds):  226





Laboratory Tests








Test


  5/12/17


18:20 5/12/17


19:04 5/13/17


09:00


 


Urine Color Yellow    


 


Urine Appearance


  Slightly


cloudy 


  


 


 


Urine pH 5 (4.5-8.0)    


 


Urine Specific Gravity


  1.025


(1.005-1.035) 


  


 


 


Urine Protein


  2+ (NEGATIVE)


H 


  


 


 


Urine Glucose (UA)


  Negative


(NEGATIVE) 


  


 


 


Urine Ketones


  1+ (NEGATIVE)


H 


  


 


 


Urine Occult Blood


  5+ (NEGATIVE)


H 


  


 


 


Urine Nitrite


  Negative


(NEGATIVE) 


  


 


 


Urine Bilirubin


  Negative


(NEGATIVE) 


  


 


 


Urine Urobilinogen


  1 MG/DL


(0.0-1.0)  H 


  


 


 


Urine Leukocyte Esterase


  1+ (NEGATIVE)


H 


  


 


 


Urine RBC


  Tntc /HPF (0 -


0)  H 


  


 


 


Urine WBC


  5-10 /HPF (0 -


0)  H 


  


 


 


Urine Squamous Epithelial


Cells Many /LPF


(NONE/OCC)  H 


  


 


 


Urine Bacteria


  Few /HPF


(NONE) 


  


 


 


Urine Mucus


  Many /LPF


(NONE/OCC)  H 


  


 


 


White Blood Count


  


  3.9 K/UL


(4.8-10.8)  L 3.6 K/UL


(4.8-10.8)  L


 


Red Blood Count


  


  3.74 M/UL


(4.70-6.10)  L 4.09 M/UL


(4.70-6.10)  L


 


Hemoglobin


  


  11.0 G/DL


(14.2-18.0)  L 11.3 G/DL


(14.2-18.0)  L


 


Hematocrit


  


  33.2 %


(42.0-52.0)  L 35.3 %


(42.0-52.0)  L


 


Mean Corpuscular Volume  89 FL (80-99)   86 FL (80-99)  


 


Mean Corpuscular Hemoglobin


  


  29.3 PG


(27.0-31.0) 27.7 PG


(27.0-31.0)


 


Mean Corpuscular Hemoglobin


Concent 


  33.0 G/DL


(32.0-36.0) 32.1 G/DL


(32.0-36.0)


 


Red Cell Distribution Width


  


  15.4 %


(11.6-14.8)  H 14.9 %


(11.6-14.8)  H


 


Platelet Count


  


  85 K/UL


(150-450)  L 100 K/UL


(150-450)  L


 


Mean Platelet Volume


  


  8.4 FL


(6.5-10.1) 8.3 FL


(6.5-10.1)


 


Neutrophils (%) (Auto)


  


  39.4 %


(45.0-75.0)  L 36.3 %


(45.0-75.0)  L


 


Lymphocytes (%) (Auto)


  


  45.5 %


(20.0-45.0)  H 49.2 %


(20.0-45.0)  H


 


Monocytes (%) (Auto)


  


  10.1 %


(1.0-10.0)  H 9.4 %


(1.0-10.0)


 


Eosinophils (%) (Auto)


  


  3.0 %


(0.0-3.0) 3.7 %


(0.0-3.0)  H


 


Basophils (%) (Auto)


  


  2.0 %


(0.0-2.0) 1.5 %


(0.0-2.0)


 


Sodium Level


  


  143 mEQ/L


(135-145) 142 mEQ/L


(135-145)


 


Potassium Level


  


  3.7 mEQ/L


(3.4-4.9) 3.9 mEQ/L


(3.4-4.9)


 


Chloride Level


  


  103 mEQ/L


() 102 mEQ/L


()


 


Carbon Dioxide Level


  


  26 mEQ/L


(20-30) 25 mEQ/L


(20-30)


 


Anion Gap  14 (5-15)   15 (5-15)  


 


Blood Urea Nitrogen


  


  17 mg/dL


(7-23) 14 mg/dL


(7-23)


 


Creatinine


  


  1.4 mg/dL


(0.7-1.2)  H 1.4 mg/dL


(0.7-1.2)  H


 


Estimat Glomerular Filtration


Rate 


  > 60 mL/min


(>60) > 60 mL/min


(>60)


 


Glucose Level


  


  105 mg/dL


() 122 mg/dL


()  H


 


Calcium Level


  


  8.7 mg/dL


(8.6-10.2) 8.8 mg/dL


(8.6-10.2)


 


Total Bilirubin


  


  < 0.2 mg/dL


(0.0-1.2) 0.5 mg/dL


(0.0-1.2)


 


Aspartate Amino Transf


(AST/SGOT) 


  28 U/L (5-40)  


  28 U/L (5-40)  


 


 


Alanine Aminotransferase


(ALT/SGPT) 


  24 U/L (3-41)  


  22 U/L (3-41)  


 


 


Alkaline Phosphatase


  


  81 U/L


() 78 U/L


()


 


Total Protein


  


  7.4 g/dL


(6.6-8.7) 7.5 g/dL


(6.6-8.7)


 


Albumin


  


  4.0 g/dL


(3.5-5.2) 4.0 g/dL


(3.5-5.2)


 


Globulin  3.4 g/dL   3.5 g/dL  


 


Albumin/Globulin Ratio  1.1 (1.0-2.7)   1.1 (1.0-2.7)  


 


Amylase Level


  


  136 U/L


()  H 113 U/L


()  H


 


Lipase  45 U/L (< 60)   26 U/L (< 60)  


 


Prothrombin Time


  


  


  15.4 SEC


(9.30-11.50)  H


 


Prothromb Time International


Ratio 


  


  1.5 (0.9-1.1)


H


 


Activated Partial


Thromboplast Time 


  


  27 SEC (23-33)


 











Current Medications








 Medications


  (Trade)  Dose


 Ordered  Sig/Kirsten


 Route


 PRN Reason  Start Time


 Stop Time Status Last Admin


Dose Admin


 


 Acetaminophen


  (Tylenol)  650 mg  Q4H  PRN


 ORAL


 fever  5/12/17 23:15


 6/11/17 23:14   


 


 


 Al Hydroxide/Mg


 Hydroxide


  (Mylanta II)  30 ml  Q6H  PRN


 ORAL


 dyspepsia  5/12/17 23:15


 6/11/17 23:14   


 


 


 Carvedilol


  (Coreg)  6.25 mg  DAILY


 ORAL


   5/13/17 09:00


 6/12/17 08:59  5/13/17 09:27


 


 


 Ceftriaxone


 Sodium/Dextrose


  (Rocephin/D5W)  55 ml @ 


 110 mls/hr  Q24H


 IVPB


   5/13/17 15:00


 5/20/17 14:59   


 


 


 Dextrose     STAT  PRN


 IV


 Hypoglycemia  5/12/17 23:15


 6/11/17 23:14   


 


 


 Dextrose/Sodium


 Chloride


  (D5ns)  1,000 ml @ 


 100 mls/hr  Q10H


 IV


   5/13/17 00:00


 6/12/17 00:00  5/13/17 13:45


 


 


 Diphenhydramine


 HCl 25 mg  25 mg  Q6H  PRN


 IVP


 Itching  5/13/17 12:00


 6/12/17 11:59  5/13/17 13:43


 


 


 Enalapril Maleate


  (Vasotec)  10 mg  DAILY


 ORAL


   5/13/17 09:00


 6/12/17 08:59  5/13/17 09:27


 


 


 Morphine Sulfate


  (Morphine


 Sulfate)  4 mg  Q4H  PRN


 IVP


 For Pain  5/13/17 01:00


 5/20/17 00:59  5/13/17 13:43


 


 


 Nitroglycerin


  (Ntg)  0.4 mg  Q5M X 3 DOSES PRN


 SL


 Prn Chest Pain  5/12/17 23:15


 6/11/17 23:14   


 


 


 Ondansetron HCl


  (Zofran)  4 mg  Q6H  PRN


 IVP


 Nausea & Vomiting  5/12/17 23:15


 6/11/17 23:14  5/13/17 01:38


 


 


 Pantoprazole


  (Protonix)  40 mg  DAILY


 ORAL


   5/13/17 09:00


 6/12/17 08:59  5/13/17 09:26


 


 


 Polyethylene


 Glycol


  (Miralax)  17 gm  HSPRN  PRN


 ORAL


 Constipation  5/12/17 23:15


 6/11/17 23:14   


 


 


 Tamsulosin HCl


  (Flomax)  4 mg  BEDTIME


 ORAL


   5/13/17 21:00


 6/12/17 20:59   


 


 


 Temazepam


  (Restoril)  15 mg  HSPRN  PRN


 ORAL


 Insomnia  5/12/17 23:15


 5/19/17 23:14   


 

















Eitan Cantu M.D. May 13, 2017 14:27

## 2017-05-14 NOTE — DIAGNOSTIC IMAGING REPORT
Indication: Abdominal pain



Technique: Ultrasound of the abdomen.



Comparison: CT abdomen and pelvis 1/20/17



Findings:



The pancreas is incompletely visualized. Visualized pancreatic head is 

grossly

unremarkable.



The liver is normal in size and echogenicity. No focal liver lesions are 

identified.

Visualized portions of the main portal vein and the hepatic veins are 

grossly

unremarkable although incompletely evaluated.



The gallbladder is unremarkable without evidence of stones. Gallbladder wall

thickness is within normal limits. Sonographic Devi's is negative. Common bile

duct measures 6 mm.



Bilateral kidneys demonstrate normal echogenicity. No focal renal lesions are seen.

There is no hydronephrosis. There is a nonobstructive calculus of the left kidney.



The spleen is normal in size and echogenicity.



The visualized aorta is normal in caliber. Visualized portions of the inferior vena

cava are unremarkable.



Impression:



No evidence of gallstones.



Nonobstructive left renal calculus.

## 2017-05-14 NOTE — GENERAL PROGRESS NOTE
Assessment/Plan


Problem List:  


(1) HIV (human immunodeficiency virus infection)


ICD Codes:  Z21 - Asymptomatic human immunodeficiency virus [HIV] infection 

status


SNOMED:  53525978


(2) Abdominal pain


ICD Codes:  R10.9 - Unspecified abdominal pain


SNOMED:  17044687


(3) Anemia, chronic disease


(4) Gastritis


ICD Codes:  K29.70 - Gastritis


SNOMED:  2723594


(5) Presence of IVC filter


ICD Codes:  Z95.828 - Presence of other vascular implants and grafts


SNOMED:  781292505, [id number]


Assessment/Plan


ppi


monitor H&H


stool ob


advance diet


gi procedures on hold





Subjective


ROS Limited/Unobtainable:  Yes


Constitutional:  Reports: no symptoms


HEENT:  Reports: no symptoms


Cardiovascular:  Reports: no symptoms


Respiratory:  Reports: no symptoms


Gastrointestinal/Abdominal:  Reports: no symptoms


Genitourinary:  Reports: no symptoms


Neurologic/Psychiatric:  Reports: no symptoms


Endocrine:  Reports: no symptoms


Hematologic/Lymphatic:  Reports: no symptoms


Allergies:  


Coded Allergies:  


     IODINATED CONTRAST MEDIA - IV DYE (Verified  Allergy, Severe, Shortness of 

Breath, 9/16/15)


     DORIPENEM (Verified  Allergy, Intermediate, Itching, 9/16/15)


     KETOROLAC TROMETHAMINE (Verified  Allergy, Intermediate, Hives, 9/16/15)


     TRAMADOL (Verified  Allergy, Intermediate, Hives, 9/16/15)


     ASPIRIN (Verified  Allergy, Mild, 12/7/10)





Objective





Last 24 Hour Vital Signs








  Date Time  Temp Pulse Resp B/P Pulse Ox O2 Delivery O2 Flow Rate FiO2


 


5/14/17 04:00 97.5 71 20 135/82 94 Room Air  


 


5/14/17 00:00 97.9 81 18 128/65 96 Room Air  


 


5/13/17 20:00 97.9 87 20 106/74 95 Room Air  


 


5/13/17 15:55 98.0 79 20 146/75 95 Room Air  


 


5/13/17 12:07 97.4 78 20 136/71 98 Room Air  


 


5/13/17 09:27    117/84    


 


5/13/17 09:27  85  117/84    


 


5/13/17 08:25 97.4 85 20 117/84 98 Room Air  

















Intake and Output  


 


 5/13/17 5/14/17





 19:00 07:00


 


Intake Total 1280 ml 1440 ml


 


Balance 1280 ml 1440 ml


 


  


 


Intake Oral 480 ml 240 ml


 


IV Total 800 ml 1200 ml


 


# Voids  2








Laboratory Tests


5/13/17 09:00: 


White Blood Count 3.6L, Red Blood Count 4.09L, Hemoglobin 11.3L, Hematocrit 

35.3L, Mean Corpuscular Volume 86, Mean Corpuscular Hemoglobin 27.7, Mean 

Corpuscular Hemoglobin Concent 32.1, Red Cell Distribution Width 14.9H, 

Platelet Count 100L, Mean Platelet Volume 8.3, Neutrophils (%) (Auto) 36.3L, 

Lymphocytes (%) (Auto) 49.2H, Monocytes (%) (Auto) 9.4, Eosinophils (%) (Auto) 

3.7H, Basophils (%) (Auto) 1.5, Prothrombin Time 15.4H, Prothromb Time 

International Ratio 1.5H, Activated Partial Thromboplast Time 27, D-Dimer 1792H

, Sodium Level 142, Potassium Level 3.9, Chloride Level 102, Carbon Dioxide 

Level 25, Anion Gap 15, Blood Urea Nitrogen 14, Creatinine 1.4H, Estimat 

Glomerular Filtration Rate > 60, Glucose Level 122H, Calcium Level 8.8, 

Ferritin 222, Total Bilirubin 0.5, Aspartate Amino Transf (AST/SGOT) 28, 

Alanine Aminotransferase (ALT/SGPT) 22, Alkaline Phosphatase 78, Total Protein 

7.5, Albumin 4.0, Globulin 3.5, Albumin/Globulin Ratio 1.1, Amylase Level 113H, 

Lipase 26, Carcinoembryonic Antigen 3.9H, CA 19-9 Antigen 0.600, Prostate 

Specific Antigen 1.1, HIV (1&2) Antibody Rapid Preliminary positiveH


Height (Feet):  6


Height (Inches):  2.00


Weight (Pounds):  226


General Appearance:  alert


EENT:  normal ENT inspection


Neck:  supple


Cardiovascular:  normal rate


Respiratory/Chest:  lungs clear


Abdomen:  normal bowel sounds, non tender, soft


Extremities:  non-tender











LAMAR ESPINO May 14, 2017 07:36

## 2017-05-14 NOTE — CONSULTATION
DATE OF CONSULTATION:  05/13/2017



HEMATOLOGY/ONCOLOGY CONSULTATION



CONSULTING PHYSICIAN:  Salomón Parmar M.D.



REFERRING PHYSICIAN:  Raimundo Zambrano D.O.



REASON FOR CONSULTATION:  Anemia, hypercoagulable state, and a

history of DVT and history of PE.



CURRENT COMPLAINT AND HISTORY OF PRESENT ILLNESS:  Dear Dr. Raimundo Zambrano,



Today, I had an opportunity to see one of your patients.  As you well

aware, this is a 51-year-old delightful gentleman with a past medical

history remarkable for hypercoagulable state, anticoagulation with

Coumadin, history of PE, history of IVC filter, and history of DVT.  The

patient claims that he got multiple upper GI bleeds over the last couple

of days.  The patient states that he has abdominal pain, nausea, and

vomiting.  The patient denies any chest pain or palpitation.



During the evaluation, it was found the patient developed some anemia.

My service was called for evaluation of anemia.



PAST MEDICAL HISTORY:  ______



1. Hypercoagulable state.

2. History of DVT.

3. History of PE.

4. Status post IVC filter placement.

5. Anticoagulation with Coumadin.

6. Diabetes mellitus.

7. HIV.

8. Dizziness.

9. Syncope.

10. Weakness.

11. Difficulty with concentration.

12. CHF.

13. Atrial fibrillation.

14. Hypertension.



MEDICATIONS:

1. Flomax.

2. Ceftriaxone.

3. Benadryl.

4. Coreg.

5. Vasotec.

6. Protonix.

7. Morphine.

8. Dextrose.

9. Zofran.

10. Temazepam.

11. Nitroglycerin.



ALLERGIES:  NKDA.



SOCIAL HISTORY:  History of _____.



FAMILY HISTORY:  Noncontributory.



REVIEW OF SYSTEMS:  General Description:  The patient is not in any

significant distress, but looks chronically ill.  Respiratory:  Mild

shortness of breath on exertion.  Gastrointestinal:  The patient claims

constipation.  Neuromuscular:  The patient claims muscle aches.



PHYSICAL EXAMINATION:

VITAL SIGNS:  T-max 97, respiratory rate 20, heart rate 80, and blood

pressure 130/80.

HEENT:  Head, normocephalic and atraumatic.

NECK:  Neck is supple.  No thyroid enlargement.  No

lymphadenopathy.

LUNGS:  Decreased breath sounds bilaterally with a few rhonchi in the

bases.

HEART:  S1 and S2 regular.

ABDOMEN:  Soft and benign.  No organomegaly present.  Bowel sounds

are present.

EXTREMITIES:  No cyanosis, clubbing, or edema.



LABORATORY DATA:  Labs today _____ pancytopenia.



IMPRESSION:

1. Hypercoagulable state.

2. History of DVT.

3. History of pulmonary emboli.

4. Status post IVC filter placement.

5. Anticoagulation with Coumadin.

6. History of GI bleed.

7. History of hematuria.

8. Diabetes mellitus.

9. Drug-seeking behavior.

10. Chronic pain syndrome.

11. Increased _____ CVA.

12. Rule out occult malignancy.

13. Anemia of chronic disease.

14. Anemia of kidney disease.

15. History of anemia of iron deficiency.

16. Failure to thrive.



RECOMMENDATIONS:

1. Watch counts.

2. Watch coagulopathy.

3. Edema of bilateral lower extremities, rule out DVT.

4. Gastroenterologist evaluation.

5. PRBC transfusion on a p.r.n. basis.

6. Platelets transfusion on a p.r.n. basis.

7. Neupogen on a p.r.n. basis if WBC less than 2.0.

8. Urology evaluation.

9. _____.

10. Skin care.

11. Close followup.









  ______________________________________________

  Salomón Parmar MD





DR:  TRA

D:  05/13/2017 16:15

T:  05/14/2017 03:27

JOB#:  [id number]

CC:

## 2017-05-14 NOTE — CONSULTATION
DATE OF CONSULTATION:  05/14/2017



PAIN MANAGEMENT CONSULTATION



CONSULTING PHYSICIAN:  Behnoush Zarrini, M.D.



REFERRING PHYSICIAN:  Raimundo Zambrano D.O.



PHYSICIAN ASSISTANT:  NIYAH Suarez



CHIEF COMPLAINT:  Abdominal pain.



HISTORY OF PRESENT ILLNESS:  This is a 51-year-old male, who is being

seen on the Med/Surg floor of [facility] for initial

comprehensive pain management consultation.  He has been having abdominal

pain, which is chronic for many years and now, he has been having, as per

patient, bloody vomiting.  He has been seen by gastroenterologist and

found to have gastritis and has continued pain.  He is on morphine 4 mg IV

every 4 hours as needed for pain, which has been helping to relieve his

pain.  At this time, the patient is comfortable and he has no other

complaints.



REVIEW OF SYSTEMS:  He denies rash, fever, chills,  sweating,

dizziness, drowsiness, blurred vision, sore throat, or change in weight.

He is complaining of vomiting.



PHYSICAL EXAMINATION:

GENERAL:  Alert, awake, and oriented.

VITAL SIGNS:  Blood pressure 128/74, heart rate 76, oxygen saturation

respirations 18, and temperature is 97.5 degrees Fahrenheit.

Height 6 feet 2 inches and weight is 226 pounds.

HEENT:  PERRLA.

NECK:  Range of motion is full in all directions.  No tenderness to

paracervical muscles.  No adenopathy.

LUNGS:  Clear.

HEART:  Regular.

ABDOMEN:  Tenderness to palpation.

EXTREMITIES:  No cyanosis.  No clubbing.

NEUROLOGICAL:  No focal deficit.



ASSESSMENT:  This is a 51-year-old male with chronic abdominal pain,

gastroparesis, and gastritis.  The patient will be continued on the

morphine 4 mg IV every four hours as needed for severe pain.  The patient

was discussed with Dr. Peralta and Dr. Peralta concurred.  We will follow

the patient.



Thank you very much for the courtesy of this consultation.







  ______________________________________________

  Behnoush Zarrini, M.D.



  ______________________________________________

  AME Suarez





DR:  EDWARD

D:  05/14/2017 12:34

T:  05/14/2017 20:26

JOB#:  [id number]

CC:



ARLIN

## 2017-05-14 NOTE — PULMONOLOGY PROGRESS NOTE
Subjective


Allergies:  


Coded Allergies:  


     IODINATED CONTRAST MEDIA - IV DYE (Verified  Allergy, Severe, Shortness of 

Breath, 9/16/15)


     DORIPENEM (Verified  Allergy, Intermediate, Itching, 9/16/15)


     KETOROLAC TROMETHAMINE (Verified  Allergy, Intermediate, Hives, 9/16/15)


     TRAMADOL (Verified  Allergy, Intermediate, Hives, 9/16/15)


     ASPIRIN (Verified  Allergy, Mild, 12/7/10)





Objective





Last 24 Hour Vital Signs








  Date Time  Temp Pulse Resp B/P Pulse Ox O2 Delivery O2 Flow Rate FiO2


 


5/14/17 20:00 97.9 82 18 123/75 96 Room Air  


 


5/14/17 16:51 97.5       


 


5/14/17 16:00 98.1 78 16 142/80 98 Room Air  


 


5/14/17 12:00 96.1 58 16 140/92 98 Room Air  


 


5/14/17 08:24    128/92    


 


5/14/17 08:24  70  128/92    


 


5/14/17 08:00 97.5 76 18 128/74 94 Room Air  


 


5/14/17 04:00 97.5 71 20 135/82 94 Room Air  


 


5/14/17 00:00 97.9 81 18 128/65 96 Room Air  

















Intake and Output  


 


 5/13/17 5/14/17





 19:00 07:00


 


Intake Total 1280 ml 1440 ml


 


Balance 1280 ml 1440 ml


 


  


 


Intake Oral 480 ml 240 ml


 


IV Total 800 ml 1200 ml


 


# Voids  2








Laboratory Tests


5/14/17 11:20: 


White Blood Count [Pending], Red Blood Count 3.97L, Hemoglobin 10.9L, 

Hematocrit 34.1L, Mean Corpuscular Volume 86, Mean Corpuscular Hemoglobin 27.5, 

Mean Corpuscular Hemoglobin Concent 32.0, Red Cell Distribution Width 15.1H, 

Platelet Count 135L, Mean Platelet Volume 8.6, Neutrophils (%) (Auto) , 

Lymphocytes (%) (Auto) , Monocytes (%) (Auto) , Eosinophils (%) (Auto) , 

Basophils (%) (Auto) , Differential Total Cells Counted 100, Neutrophils % (

Manual) 30L, Lymphocytes % (Manual) 58H, Monocytes % (Manual) 6, Eosinophils % (

Manual) 6H, Basophils % (Manual) 0, Band Neutrophils 0, Lymphocytes [Pending], 

Platelet Estimate DecreasedL, Platelet Morphology Normal, Hypochromasia 1+, 

Anisocytosis 1+, Lupus Anticoagulant [Pending], Lupus Anticoagulant PTT 

Baseline [Pending], Lupus Anticoag DRVVT Screen Ratio [Pending], DRVVT 

Confirmation Interpretation [Pending], Hexagonal Phase Comment [Pending], 

Protein C Activity [Pending], Protein S Activity [Pending], Anti-Thrombin III 

Activity [Pending], Factor V Mutation [Pending], Sodium Level 140, Potassium 

Level 3.7, Chloride Level 101, Carbon Dioxide Level 25, Anion Gap 14, Blood 

Urea Nitrogen 11, Creatinine 1.2, Estimat Glomerular Filtration Rate > 60, 

Glucose Level 87, Calcium Level 7.8L, Ammonia 50, Alpha Fetoprotein [Pending], 

Percent CD3 Cells [Pending], Absolute CD3 Count [Pending], Percent CD4 Cells [

Pending], Absolute CD4 Count [Pending], T-Lymphocyte CD4/CD8 Ratio [Pending], 

Percent CD8 Cells [Pending], Absolute CD8 Count [Pending], Hepatitis A IgM 

Antibody [Pending], Hepatitis B Surface Antigen [Pending], Hepatitis B Core IgM 

Antibody [Pending], Hepatitis C Antibody [Pending], HIV-1 RNA, Quantitative 

copies/mL [Pending], HIV-1 RNA (PCR) log10 Value [Pending], HIV-1 RNA 

Ultraquantitative (PCR) [Pending], HIV Genotype [Pending]


5/14/17 12:30: Stool Occult Blood [Pending]





Current Medications








 Medications


  (Trade)  Dose


 Ordered  Sig/Kirsten


 Route


 PRN Reason  Start Time


 Stop Time Status Last Admin


Dose Admin


 


 Acetaminophen


  (Tylenol)  650 mg  Q4H  PRN


 ORAL


 fever  5/12/17 23:15


 6/11/17 23:14   


 


 


 Al Hydroxide/Mg


 Hydroxide


  (Mylanta II)  30 ml  Q6H  PRN


 ORAL


 dyspepsia  5/12/17 23:15


 6/11/17 23:14   


 


 


 Carvedilol


  (Coreg)  6.25 mg  DAILY


 ORAL


   5/13/17 09:00


 6/12/17 08:59  5/14/17 08:24


 


 


 Ceftriaxone


 Sodium/Dextrose


  (Rocephin/D5W)  55 ml @ 


 110 mls/hr  Q24H


 IVPB


   5/13/17 15:00


 5/20/17 14:59  5/14/17 15:18


 


 


 Dextrose     STAT  PRN


 IV


 Hypoglycemia  5/12/17 23:15


 6/11/17 23:14   


 


 


 Dextrose/Sodium


 Chloride


  (D5ns)  1,000 ml @ 


 100 mls/hr  Q10H


 IV


   5/13/17 00:00


 6/12/17 00:00  5/14/17 12:13


 


 


 Diphenhydramine


 HCl 25 mg  25 mg  Q6H  PRN


 IVP


 Itching  5/13/17 12:00


 6/12/17 11:59  5/14/17 20:41


 


 


 Enalapril Maleate


  (Vasotec)  10 mg  DAILY


 ORAL


   5/13/17 09:00


 6/12/17 08:59  5/14/17 08:24


 


 


 Morphine Sulfate


  (Morphine


 Sulfate)  4 mg  Q4H  PRN


 IVP


 For Pain  5/13/17 01:00


 5/20/17 00:59  5/14/17 20:42


 


 


 Nitroglycerin


  (Ntg)  0.4 mg  Q5M X 3 DOSES PRN


 SL


 Prn Chest Pain  5/12/17 23:15


 6/11/17 23:14   


 


 


 Ondansetron HCl


  (Zofran)  4 mg  Q6H  PRN


 IVP


 Nausea & Vomiting  5/12/17 23:15


 6/11/17 23:14  5/14/17 20:41


 


 


 Pantoprazole


  (Protonix)  40 mg  DAILY


 ORAL


   5/13/17 09:00


 6/12/17 08:59  5/14/17 08:24


 


 


 Polyethylene


 Glycol


  (Miralax)  17 gm  HSPRN  PRN


 ORAL


 Constipation  5/12/17 23:15


 6/11/17 23:14   


 


 


 Tamsulosin HCl


  (Flomax)  0.4 mg  BEDTIME


 ORAL


   5/13/17 21:00


 6/12/17 20:59  5/14/17 20:41


 


 


 Temazepam


  (Restoril)  15 mg  HSPRN  PRN


 ORAL


 Insomnia  5/12/17 23:15


 5/19/17 23:14   


 

















CHAVO DUNBAR May 14, 2017 22:17

## 2017-05-14 NOTE — GENERAL PROGRESS NOTE
Assessment/Plan


Assessment/Plan


IMPRESSION:


1. Hypercoagulable state.


2. History of DVT.


3. History of pulmonary emboli.


4. Status post IVC filter placement.


5. Anticoagulation with Coumadin.


6. History of GI bleed.


7. History of hematuria.


8. Diabetes mellitus.


9. Drug-seeking behavior.


10. Chronic pain syndrome.


11. Increased _____ CVA.


12. Rule out occult malignancy.


13. Anemia of chronic disease.


14. Anemia of kidney disease.


15. History of anemia of iron deficiency.


16. Failure to thrive.





RECOMMENDATIONS:


1. Watch counts.


2. Watch coagulopathy.


3. Edema of bilateral lower extremities, rule out DVT.


4. Gastroenterologist evaluation.


5. PRBC transfusion on a p.r.n. basis.


6. Platelets transfusion on a p.r.n. basis.


7. Neupogen on a p.r.n. basis if WBC less than 2.0.


8. Urology evaluation.


9. Skin care.


10. Close followup.





Subjective


Constitutional:  Reports: no symptoms


HEENT:  Reports: no symptoms


Cardiovascular:  Reports: no symptoms


Respiratory:  Reports: no symptoms


Gastrointestinal/Abdominal:  Reports: no symptoms


Genitourinary:  Reports: no symptoms


Neurologic/Psychiatric:  Reports: no symptoms


Endocrine:  Reports: no symptoms


Hematologic/Lymphatic:  Reports: anemia


Allergies:  


Coded Allergies:  


     IODINATED CONTRAST MEDIA - IV DYE (Verified  Allergy, Severe, Shortness of 

Breath, 9/16/15)


     DORIPENEM (Verified  Allergy, Intermediate, Itching, 9/16/15)


     KETOROLAC TROMETHAMINE (Verified  Allergy, Intermediate, Hives, 9/16/15)


     TRAMADOL (Verified  Allergy, Intermediate, Hives, 9/16/15)


     ASPIRIN (Verified  Allergy, Mild, 12/7/10)


Subjective


stable, no events, no fevers or chills





Objective





Last 24 Hour Vital Signs








  Date Time  Temp Pulse Resp B/P Pulse Ox O2 Delivery O2 Flow Rate FiO2


 


5/14/17 12:00 96.1 58 16 140/92 98 Room Air  


 


5/14/17 08:24    128/92    


 


5/14/17 08:24  70  128/92    


 


5/14/17 08:00 97.5 76 18 128/74 94 Room Air  


 


5/14/17 04:00 97.5 71 20 135/82 94 Room Air  


 


5/14/17 00:00 97.9 81 18 128/65 96 Room Air  


 


5/13/17 20:00 97.9 87 20 106/74 95 Room Air  


 


5/13/17 15:55 98.0 79 20 146/75 95 Room Air  

















Intake and Output  


 


 5/13/17 5/14/17





 19:00 07:00


 


Intake Total 1280 ml 1440 ml


 


Balance 1280 ml 1440 ml


 


  


 


Intake Oral 480 ml 240 ml


 


IV Total 800 ml 1200 ml


 


# Voids  2








Laboratory Tests


5/14/17 11:20: 


White Blood Count [Pending], Red Blood Count 3.97L, Hemoglobin 10.9L, 

Hematocrit 34.1L, Mean Corpuscular Volume 86, Mean Corpuscular Hemoglobin 27.5, 

Mean Corpuscular Hemoglobin Concent 32.0, Red Cell Distribution Width 15.1H, 

Platelet Count 135L, Mean Platelet Volume 8.6, Neutrophils (%) (Auto) , 

Lymphocytes (%) (Auto) , Monocytes (%) (Auto) , Eosinophils (%) (Auto) , 

Basophils (%) (Auto) , Neutrophils % (Manual) [Pending], Lymphocytes % (Manual) 

[Pending], Lymphocytes [Pending], Platelet Estimate [Pending], Platelet 

Morphology [Pending], Lupus Anticoagulant [Pending], Lupus Anticoagulant PTT 

Baseline [Pending], Lupus Anticoag DRVVT Screen Ratio [Pending], DRVVT 

Confirmation Interpretation [Pending], Hexagonal Phase Comment [Pending], 

Protein C Activity [Pending], Protein S Activity [Pending], Anti-Thrombin III 

Activity [Pending], Factor V Mutation [Pending], Sodium Level [Pending], 

Potassium Level [Pending], Chloride Level [Pending], Carbon Dioxide Level [

Pending], Blood Urea Nitrogen [Pending], Creatinine [Pending], Estimat 

Glomerular Filtration Rate [Pending], Glucose Level [Pending], Calcium Level [

Pending], Ammonia [Pending], Alpha Fetoprotein [Pending], Percent CD3 Cells [

Pending], Absolute CD3 Count [Pending], Percent CD4 Cells [Pending], Absolute 

CD4 Count [Pending], T-Lymphocyte CD4/CD8 Ratio [Pending], Percent CD8 Cells [

Pending], Absolute CD8 Count [Pending], Hepatitis A IgM Antibody [Pending], 

Hepatitis B Surface Antigen [Pending], Hepatitis B Core IgM Antibody [Pending], 

Hepatitis C Antibody [Pending], HIV-1 RNA, Quantitative copies/mL [Pending], HIV

-1 RNA (PCR) log10 Value [Pending], HIV-1 RNA Ultraquantitative (PCR) [Pending]

, HIV Genotype [Pending]


Height (Feet):  6


Height (Inches):  2.00


Weight (Pounds):  226


General Appearance:  alert


EENT:  normal ENT inspection


Neck:  supple


Cardiovascular:  regular rhythm


Respiratory/Chest:  lungs clear


Extremities:  non-tender











Jb Parmar May 14, 2017 13:41

## 2017-05-14 NOTE — CONSULTATION
History of Present Illness


General


Date patient seen:  May 14, 2017





Present Illness


Allergies:  


Coded Allergies:  


     IODINATED CONTRAST MEDIA - IV DYE (Verified  Allergy, Severe, Shortness of 

Breath, 9/16/15)


     DORIPENEM (Verified  Allergy, Intermediate, Itching, 9/16/15)


     KETOROLAC TROMETHAMINE (Verified  Allergy, Intermediate, Hives, 9/16/15)


     TRAMADOL (Verified  Allergy, Intermediate, Hives, 9/16/15)


     ASPIRIN (Verified  Allergy, Mild, 12/7/10)





Medication History


Scheduled


Carvedilol (Coreg), 6.25 MG ORAL DAILY, (Reported)


Enalapril Maleate* (Vasotec*), 10 MG ORAL DAILY, (Reported)


Isosorbide Mononitrate* (Imdur*), 60 MG ORAL DAILY, (Reported)


Tamsulosin Hcl (Tamsulosin Hcl*), Unknown Dose ORAL BEDTIME, (Reported)


Terazosin HCl (Terazosin HCl), 10 MG PO QHS, (Reported)


Warfarin Sod* (Coumadin*), 5 MG ORAL DAILY, (Reported)





Scheduled PRN


Ondansetron (Zofran), 4 MG ORAL Q8H PRN, (Reported)





Discontinued Medications


Aspirin* (Aspir 81*), 81 MG ORAL DAILY, (Reported)


   Discontinued Reason: Therapy completed


Esomeprazole Magnesium (Nexium), 40 MG ORAL DAILY, (Reported)


   Discontinued Reason: Therapy completed


Ferrous Sulfate* (Ferrous Sulfate*), 325 MG ORAL DAILY, (Reported)


   Discontinued Reason: Therapy completed


Gabapentin (Neurontin), 300 MG ORAL THREE TIMES A DAY, (Reported)


   Discontinued Reason: Therapy completed


Hydrocodone/Acetaminophen (Hydrocodon-Acetaminophn ), 1 TAB ORAL Q4H PRN 

for For Pain, (Reported)


   Discontinued Reason: Therapy completed


Lactobacillus Rhamnosus Gg* (Culturelle*), 1 CAP ORAL THREE TIMES A DAY, (

Reported)


   Discontinued Reason: Therapy completed


Metoclopramide Hcl* (Reglan*), 10 MG PO TID PRN, (Reported)


   Discontinued Reason: Therapy completed


Metronidazole* (Flagyl*), 500 MG ORAL EVERY 8 HOURS, (Reported)


   Discontinued Reason: Therapy completed


Nitroglycerin (Nitrostat), 0.4 MG SL, (Reported)


   Discontinued Reason: Therapy completed


Sucralfate* (Carafate*), 1 GM ORAL FOUR TIMES A DAY, (Reported)


   Discontinued Reason: Therapy completed





Patient History


Healthcare decision maker





Resuscitation status





Advanced Directive on File








Physical Exam





Last 24 Hour Vital Signs








  Date Time  Temp Pulse Resp B/P Pulse Ox O2 Delivery O2 Flow Rate FiO2


 


5/14/17 08:24    128/92    


 


5/14/17 08:24  70  128/92    


 


5/14/17 08:00 97.5 76 18 128/74 94 Room Air  


 


5/14/17 04:00 97.5 71 20 135/82 94 Room Air  


 


5/14/17 00:00 97.9 81 18 128/65 96 Room Air  


 


5/13/17 20:00 97.9 87 20 106/74 95 Room Air  


 


5/13/17 15:55 98.0 79 20 146/75 95 Room Air  

















Intake and Output  


 


 5/13/17 5/14/17





 19:00 07:00


 


Intake Total 1280 ml 1440 ml


 


Balance 1280 ml 1440 ml


 


  


 


Intake Oral 480 ml 240 ml


 


IV Total 800 ml 1200 ml


 


# Voids  2








Height (Feet):  6


Height (Inches):  2.00


Weight (Pounds):  226


Medications





Current Medications








 Medications


  (Trade)  Dose


 Ordered  Sig/Kirsten


 Route


 PRN Reason  Start Time


 Stop Time Status Last Admin


Dose Admin


 


 Acetaminophen


  (Tylenol)  650 mg  Q4H  PRN


 ORAL


 fever  5/12/17 23:15


 6/11/17 23:14   


 


 


 Al Hydroxide/Mg


 Hydroxide


  (Mylanta II)  30 ml  Q6H  PRN


 ORAL


 dyspepsia  5/12/17 23:15


 6/11/17 23:14   


 


 


 Carvedilol


  (Coreg)  6.25 mg  DAILY


 ORAL


   5/13/17 09:00


 6/12/17 08:59  5/14/17 08:24


 


 


 Ceftriaxone


 Sodium/Dextrose


  (Rocephin/D5W)  55 ml @ 


 110 mls/hr  Q24H


 IVPB


   5/13/17 15:00


 5/20/17 14:59  5/13/17 15:42


 


 


 Dextrose     STAT  PRN


 IV


 Hypoglycemia  5/12/17 23:15


 6/11/17 23:14   


 


 


 Dextrose/Sodium


 Chloride


  (D5ns)  1,000 ml @ 


 100 mls/hr  Q10H


 IV


   5/13/17 00:00


 6/12/17 00:00  5/14/17 12:13


 


 


 Diphenhydramine


 HCl 25 mg  25 mg  Q6H  PRN


 IVP


 Itching  5/13/17 12:00


 6/12/17 11:59  5/14/17 12:13


 


 


 Enalapril Maleate


  (Vasotec)  10 mg  DAILY


 ORAL


   5/13/17 09:00


 6/12/17 08:59  5/14/17 08:24


 


 


 Morphine Sulfate


  (Morphine


 Sulfate)  4 mg  Q4H  PRN


 IVP


 For Pain  5/13/17 01:00


 5/20/17 00:59  5/14/17 12:13


 


 


 Nitroglycerin


  (Ntg)  0.4 mg  Q5M X 3 DOSES PRN


 SL


 Prn Chest Pain  5/12/17 23:15


 6/11/17 23:14   


 


 


 Ondansetron HCl


  (Zofran)  4 mg  Q6H  PRN


 IVP


 Nausea & Vomiting  5/12/17 23:15


 6/11/17 23:14  5/14/17 12:13


 


 


 Pantoprazole


  (Protonix)  40 mg  DAILY


 ORAL


   5/13/17 09:00


 6/12/17 08:59  5/14/17 08:24


 


 


 Polyethylene


 Glycol


  (Miralax)  17 gm  HSPRN  PRN


 ORAL


 Constipation  5/12/17 23:15


 6/11/17 23:14   


 


 


 Tamsulosin HCl


  (Flomax)  0.4 mg  BEDTIME


 ORAL


   5/13/17 21:00


 6/12/17 20:59  5/13/17 23:01


 


 


 Temazepam


  (Restoril)  15 mg  HSPRN  PRN


 ORAL


 Insomnia  5/12/17 23:15


 5/19/17 23:14   


 











Assessment/Plan


Assessment/Plan


(1) Chronic Abdominal pain 


(2) Gastritis


(3) Gastroparesis 


seen dictated











TEDDY JORGE May 14, 2017 12:22

## 2017-05-15 NOTE — GENERAL PROGRESS NOTE
Assessment/Plan


Problem List:  


(1) Acute GI bleeding


ICD Codes:  K92.2 - Gastrointestinal hemorrhage, unspecified


SNOMED:  75014665


(2) HTN (hypertension)


ICD Codes:  I10 - HTN (hypertension)


SNOMED:  07451666


(3) Gastroparesis


ICD Codes:  K31.84 - Gastroparesis


SNOMED:  603126469


(4) Intractable abdominal pain


ICD Codes:  R10.9 - Intractable abdominal pain


SNOMED:  02013323


(5) DVT (deep venous thrombosis)


ICD Codes:  I82.409 - Acute embolism and thombos unsp deep vn unsp lower 

extremity


SNOMED:  863277951


(6) CHF (congestive heart failure)


ICD Codes:  I50.9 - Heart failure, unspecified


SNOMED:  70932526


Status:  stable, progressing, tolerating diet


Assessment/Plan


gi pain f/u cbc bmp am dc plan





Subjective


Constitutional:  Reports: weakness


Allergies:  


Coded Allergies:  


     IODINATED CONTRAST MEDIA - IV DYE (Verified  Allergy, Severe, Shortness of 

Breath, 9/16/15)


     DORIPENEM (Verified  Allergy, Intermediate, Itching, 9/16/15)


     KETOROLAC TROMETHAMINE (Verified  Allergy, Intermediate, Hives, 9/16/15)


     TRAMADOL (Verified  Allergy, Intermediate, Hives, 9/16/15)


     ASPIRIN (Verified  Allergy, Mild, 12/7/10)


All Systems:  reviewed and negative except above


Subjective


anxious





Objective





Last 24 Hour Vital Signs








  Date Time  Temp Pulse Resp B/P Pulse Ox O2 Delivery O2 Flow Rate FiO2


 


5/15/17 12:22 97.7 78 19 145/71 100 Room Air  


 


5/15/17 09:35 97.7       


 


5/15/17 09:05    134/78    


 


5/15/17 09:04  90  134/78    


 


5/15/17 08:00 97.7 90 18 134/78 100 Room Air  


 


5/15/17 04:00 97.6 70 18 126/78 95 Room Air  


 


5/14/17 23:40 97.7 72 18 109/54 94 Room Air  


 


5/14/17 20:00 97.9 82 18 123/75 96 Room Air  


 


5/14/17 16:00 98.1 78 16 142/80 98 Room Air  

















Intake and Output  


 


 5/14/17 5/15/17





 19:00 07:00


 


Intake Total 1100 ml 1695 ml


 


Balance 1100 ml 1695 ml


 


  


 


Intake Oral  420 ml


 


IV Total 1100 ml 1275 ml


 


# Voids  3


 


# Bowel Movements 1 








Laboratory Tests


5/15/17 03:30: 


White Blood Count 4.2L, Red Blood Count 3.92L, Hemoglobin 11.1L, Hematocrit 

34.0L, Mean Corpuscular Volume 87, Mean Corpuscular Hemoglobin 28.3, Mean 

Corpuscular Hemoglobin Concent 32.6, Red Cell Distribution Width 15.2H, 

Platelet Count 137L, Mean Platelet Volume 7.5, Neutrophils (%) (Auto) 43.3L, 

Lymphocytes (%) (Auto) 42.7, Monocytes (%) (Auto) 7.8, Eosinophils (%) (Auto) 

4.9H, Basophils (%) (Auto) 1.2


Height (Feet):  6


Height (Inches):  2.00


Weight (Pounds):  226


General Appearance:  alert


EENT:  normal ENT inspection


Neck:  normal alignment


Cardiovascular:  normal peripheral pulses, normal rate, regular rhythm


Respiratory/Chest:  chest wall non-tender, lungs clear, normal breath sounds


Abdomen:  normal bowel sounds, non tender, soft


Extremities:  normal inspection


Edema:  no edema noted Arm (L), no edema noted Arm (R), no edema noted Leg (L), 

no edema noted Leg (R), no edema noted Pedal (L), no edema noted Pedal (R), no 

edema noted Generalized


Neurologic:  responsive, motor weakness


Skin:  normal pigmentation, warm/dry











TUCKER GUILLERMO May 15, 2017 13:37

## 2017-05-15 NOTE — GENERAL PROGRESS NOTE
Assessment/Plan


Assessment/Plan


IMPRESSION:


1. Hypercoagulable state. Currently off coumadin given hemoptysis, has a IVC 

filter in place


2. History of DVT.


3. History of pulmonary emboli.


4. Status post IVC filter placement.


5. Anticoagulation with Coumadin.


6. History of GI bleed.


7. History of hematuria.


8. Diabetes mellitus.


9. Drug-seeking behavior.


10. Chronic pain syndrome.


11. Increased _____ CVA.


12. Rule out occult malignancy.


13. Anemia of chronic disease.


14. Anemia of kidney disease.


15. History of anemia of iron deficiency.





RECOMMENDATIONS:


1. Watch counts.


2. Watch coagulopathy.


3. No clots noted on lower extremities


4. Gastroenterologist evaluation.


5. PRBC transfusion on a p.r.n. basis.


6. Platelets transfusion on a p.r.n. basis.


7. Off coumadin


8. Urology eval


9. Skin care.


10. Close followup.





Subjective


Constitutional:  Reports: no symptoms


HEENT:  Reports: no symptoms


Cardiovascular:  Reports: no symptoms


Gastrointestinal/Abdominal:  Reports: poor fluid intake


Genitourinary:  Reports: no symptoms


Neurologic/Psychiatric:  Reports: no symptoms


Endocrine:  Reports: no symptoms


Hematologic/Lymphatic:  Reports: anemia


Allergies:  


Coded Allergies:  


     IODINATED CONTRAST MEDIA - IV DYE (Verified  Allergy, Severe, Shortness of 

Breath, 9/16/15)


     DORIPENEM (Verified  Allergy, Intermediate, Itching, 9/16/15)


     KETOROLAC TROMETHAMINE (Verified  Allergy, Intermediate, Hives, 9/16/15)


     TRAMADOL (Verified  Allergy, Intermediate, Hives, 9/16/15)


     ASPIRIN (Verified  Allergy, Mild, 12/7/10)


Subjective


stable, no events, no fevers or chills reported





Objective





Last 24 Hour Vital Signs








  Date Time  Temp Pulse Resp B/P Pulse Ox O2 Delivery O2 Flow Rate FiO2


 


5/15/17 12:22 97.7 78 19 145/71 100 Room Air  


 


5/15/17 09:35 97.7       


 


5/15/17 09:05    134/78    


 


5/15/17 09:04  90  134/78    


 


5/15/17 08:00 97.7 90 18 134/78 100 Room Air  


 


5/15/17 04:00 97.6 70 18 126/78 95 Room Air  


 


5/14/17 23:40 97.7 72 18 109/54 94 Room Air  


 


5/14/17 20:00 97.9 82 18 123/75 96 Room Air  


 


5/14/17 16:00 98.1 78 16 142/80 98 Room Air  

















Intake and Output  


 


 5/14/17 5/15/17





 19:00 07:00


 


Intake Total 1100 ml 1695 ml


 


Balance 1100 ml 1695 ml


 


  


 


Intake Oral  420 ml


 


IV Total 1100 ml 1275 ml


 


# Voids  3


 


# Bowel Movements 1 








Laboratory Tests


5/15/17 03:30: 


White Blood Count 4.2L, Red Blood Count 3.92L, Hemoglobin 11.1L, Hematocrit 

34.0L, Mean Corpuscular Volume 87, Mean Corpuscular Hemoglobin 28.3, Mean 

Corpuscular Hemoglobin Concent 32.6, Red Cell Distribution Width 15.2H, 

Platelet Count 137L, Mean Platelet Volume 7.5, Neutrophils (%) (Auto) 43.3L, 

Lymphocytes (%) (Auto) 42.7, Monocytes (%) (Auto) 7.8, Eosinophils (%) (Auto) 

4.9H, Basophils (%) (Auto) 1.2


Height (Feet):  6


Height (Inches):  2.00


Weight (Pounds):  226


General Appearance:  alert


EENT:  normal ENT inspection


Neck:  supple


Cardiovascular:  regular rhythm


Respiratory/Chest:  no respiratory distress


Abdomen:  no mass


Pelvis:  no cerv. motion tender


Extremities:  non-tender


Edema:  1+ Leg (L), 1+ Leg (R)


Edema:  mild edema


Neurologic:  alert


Skin:  warm/dry











Jb Parmar May 15, 2017 12:47

## 2017-05-15 NOTE — GI PROGRESS NOTE
Assessment/Plan


Problems:  


(1) Denies alcohol consumption


ICD Codes:  Z78.9 - Other specified health status


SNOMED:  049971906


(2) Acute GI bleeding


ICD Codes:  K92.2 - Gastrointestinal hemorrhage, unspecified


SNOMED:  04773935


(3) Abdominal pain


ICD Codes:  R10.9 - Unspecified abdominal pain


SNOMED:  36360848


(4) Gastritis


ICD Codes:  K29.70 - Gastritis


SNOMED:  0089212


(5) Hemoptysis


ICD Codes:  R04.2 - Hemoptysis


SNOMED:  57167135


(6) Iron deficiency


ICD Codes:  E61.1 - Iron deficiency


SNOMED:  62163234


(7) Abdominal pain of unknown etiology


ICD Codes:  R10.9 - Unspecified abdominal pain


SNOMED:  558118077


Status:  stable


Status Narrative


Discussed with Dr. Cleveland.


Assessment/Plan


s/p colonoscopy with unremarkable results per patient.


OB stool negative


abdominal U/S >> unremarkable


stable H&H





defer GI procedures for now


ppi


monitor H&H


advance diet


fu hep panel


fu labs





Subjective


Subjective


abdominal pain


hemoptysis +





Objective





Last 24 Hour Vital Signs








  Date Time  Temp Pulse Resp B/P Pulse Ox O2 Delivery O2 Flow Rate FiO2


 


5/15/17 12:22 97.7 78 19 145/71 100 Room Air  


 


5/15/17 09:35 97.7       


 


5/15/17 09:05    134/78    


 


5/15/17 09:04  90  134/78    


 


5/15/17 08:00 97.7 90 18 134/78 100 Room Air  


 


5/15/17 04:00 97.6 70 18 126/78 95 Room Air  


 


5/14/17 23:40 97.7 72 18 109/54 94 Room Air  


 


5/14/17 20:00 97.9 82 18 123/75 96 Room Air  


 


5/14/17 16:00 98.1 78 16 142/80 98 Room Air  

















Intake and Output  


 


 5/14/17 5/15/17





 19:00 07:00


 


Intake Total 1100 ml 1695 ml


 


Balance 1100 ml 1695 ml


 


  


 


Intake Oral  420 ml


 


IV Total 1100 ml 1275 ml


 


# Voids  3


 


# Bowel Movements 1 











Laboratory Tests








Test


  5/15/17


03:30


 


White Blood Count


  4.2 K/UL


(4.8-10.8)  L


 


Red Blood Count


  3.92 M/UL


(4.70-6.10)  L


 


Hemoglobin


  11.1 G/DL


(14.2-18.0)  L


 


Hematocrit


  34.0 %


(42.0-52.0)  L


 


Mean Corpuscular Volume 87 FL (80-99)  


 


Mean Corpuscular Hemoglobin


  28.3 PG


(27.0-31.0)


 


Mean Corpuscular Hemoglobin


Concent 32.6 G/DL


(32.0-36.0)


 


Red Cell Distribution Width


  15.2 %


(11.6-14.8)  H


 


Platelet Count


  137 K/UL


(150-450)  L


 


Mean Platelet Volume


  7.5 FL


(6.5-10.1)


 


Neutrophils (%) (Auto)


  43.3 %


(45.0-75.0)  L


 


Lymphocytes (%) (Auto)


  42.7 %


(20.0-45.0)


 


Monocytes (%) (Auto)


  7.8 %


(1.0-10.0)


 


Eosinophils (%) (Auto)


  4.9 %


(0.0-3.0)  H


 


Basophils (%) (Auto)


  1.2 %


(0.0-2.0)








Height (Feet):  6


Height (Inches):  2.00


Weight (Pounds):  226


General Appearance:  no apparent distress, alert


Cardiovascular:  normal rate


Respiratory/Chest:  normal breath sounds, no respiratory distress


Abdominal Exam:  normal bowel sounds, non tender, soft


Extremities:  normal range of motion











Yashira Locke N.P. May 15, 2017 13:37

## 2017-05-15 NOTE — PULMONOLOGY PROGRESS NOTE
Subjective


Allergies:  


Coded Allergies:  


     IODINATED CONTRAST MEDIA - IV DYE (Verified  Allergy, Severe, Shortness of 

Breath, 9/16/15)


     DORIPENEM (Verified  Allergy, Intermediate, Itching, 9/16/15)


     KETOROLAC TROMETHAMINE (Verified  Allergy, Intermediate, Hives, 9/16/15)


     TRAMADOL (Verified  Allergy, Intermediate, Hives, 9/16/15)


     ASPIRIN (Verified  Allergy, Mild, 12/7/10)





Objective





Last 24 Hour Vital Signs








  Date Time  Temp Pulse Resp B/P Pulse Ox O2 Delivery O2 Flow Rate FiO2


 


5/15/17 20:00 96.8 64 20 134/74 98 Room Air  


 


5/15/17 15:52 97.7 76 19 132/67 100 Room Air  


 


5/15/17 15:32 97.7       


 


5/15/17 12:22 97.7 78 19 145/71 100 Room Air  


 


5/15/17 09:05    134/78    


 


5/15/17 09:04  90  134/78    


 


5/15/17 08:00 97.7 90 18 134/78 100 Room Air  


 


5/15/17 04:00 97.6 70 18 126/78 95 Room Air  


 


5/14/17 23:40 97.7 72 18 109/54 94 Room Air  

















Intake and Output  


 


 5/14/17 5/15/17





 19:00 07:00


 


Intake Total 1100 ml 1695 ml


 


Balance 1100 ml 1695 ml


 


  


 


Intake Oral  420 ml


 


IV Total 1100 ml 1275 ml


 


# Voids  3


 


# Bowel Movements 1 








Laboratory Tests


5/15/17 03:30: 


White Blood Count 4.2L, Red Blood Count 3.92L, Hemoglobin 11.1L, Hematocrit 

34.0L, Mean Corpuscular Volume 87, Mean Corpuscular Hemoglobin 28.3, Mean 

Corpuscular Hemoglobin Concent 32.6, Red Cell Distribution Width 15.2H, 

Platelet Count 137L, Mean Platelet Volume 7.5, Neutrophils (%) (Auto) 43.3L, 

Lymphocytes (%) (Auto) 42.7, Monocytes (%) (Auto) 7.8, Eosinophils (%) (Auto) 

4.9H, Basophils (%) (Auto) 1.2





Current Medications








 Medications


  (Trade)  Dose


 Ordered  Sig/Kirsten


 Route


 PRN Reason  Start Time


 Stop Time Status Last Admin


Dose Admin


 


 Acetaminophen


  (Tylenol)  650 mg  Q4H  PRN


 ORAL


 fever  5/12/17 23:15


 6/11/17 23:14   


 


 


 Al Hydroxide/Mg


 Hydroxide


  (Mylanta II)  30 ml  Q6H  PRN


 ORAL


 dyspepsia  5/12/17 23:15


 6/11/17 23:14   


 


 


 Carvedilol


  (Coreg)  6.25 mg  DAILY


 ORAL


   5/13/17 09:00


 6/12/17 08:59  5/15/17 09:04


 


 


 Ceftriaxone


 Sodium/Dextrose


  (Rocephin/D5W)  55 ml @ 


 110 mls/hr  Q24H


 IVPB


   5/13/17 15:00


 5/20/17 14:59  5/15/17 13:57


 


 


 Dextrose     STAT  PRN


 IV


 Hypoglycemia  5/12/17 23:15


 6/11/17 23:14   


 


 


 Dextrose/Sodium


 Chloride


  (D5ns)  1,000 ml @ 


 100 mls/hr  Q10H


 IV


   5/13/17 00:00


 6/12/17 00:00  5/15/17 20:57


 


 


 Diphenhydramine


 HCl 25 mg  25 mg  Q6H  PRN


 IVP


 Itching  5/13/17 12:00


 6/12/17 11:59  5/15/17 20:57


 


 


 Enalapril Maleate


  (Vasotec)  10 mg  DAILY


 ORAL


   5/13/17 09:00


 6/12/17 08:59  5/15/17 09:05


 


 


 Morphine Sulfate


  (Morphine


 Sulfate)  4 mg  Q4H  PRN


 IVP


 For Pain  5/13/17 01:00


 5/20/17 00:59  5/15/17 20:57


 


 


 Nitroglycerin


  (Ntg)  0.4 mg  Q5M X 3 DOSES PRN


 SL


 Prn Chest Pain  5/12/17 23:15


 6/11/17 23:14   


 


 


 Ondansetron HCl


  (Zofran)  4 mg  Q6H  PRN


 IVP


 Nausea & Vomiting  5/12/17 23:15


 6/11/17 23:14  5/15/17 20:57


 


 


 Pantoprazole


  (Protonix)  40 mg  DAILY


 ORAL


   5/13/17 09:00


 6/12/17 08:59  5/15/17 09:04


 


 


 Polyethylene


 Glycol


  (Miralax)  17 gm  HSPRN  PRN


 ORAL


 Constipation  5/12/17 23:15


 6/11/17 23:14   


 


 


 Tamsulosin HCl


  (Flomax)  0.4 mg  BEDTIME


 ORAL


   5/13/17 21:00


 6/12/17 20:59  5/15/17 20:57


 


 


 Temazepam


  (Restoril)  15 mg  HSPRN  PRN


 ORAL


 Insomnia  5/12/17 23:15


 5/19/17 23:14   


 

















CHAVO DUNBAR May 15, 2017 23:27

## 2017-05-15 NOTE — INFECTIOUS DISEASES PROG NOTE
Assessment/Plan


Problems:  


(1) UTI (urinary tract infection)


Assessment & Plan:  continue  ceftriaxon empirically for now , renal US showed 

nonobstructing stone, recommend urology follow up





(2) HIV (human immunodeficiency virus infection)


Assessment & Plan:  await  viral load and CD4 to confirm , and genotype , 

patient was counseled regarding safe sex practice, and he is required to notify 

his wife of his HIV status if confirmed positive.


recommend follow up with HIV provider as an outpatient to start ART if HIV 

status is confirmed , D/W patient  





(3) GI bleeding


Assessment & Plan:  monitor H/H , transfuse blood as needed, Gi is following 








Subjective


Constitutional:  Reports: no symptoms


HEENT:  Reports: no symptoms


Respiratory:  Reports: other - hemoptysis


Cardiovascular:  Reports: no symptoms


Gastrointestinal/Abdominal:  Reports: vomiting


Genitourinary:  Reports: hematuria


Neurologic:  Reports: no symptoms


Psychiatric:  Reports: no symptoms


Skin:  Reports: no symptoms


Endocrine:  Reports: no symptoms


Hematologic:  Reports: no symptoms


Allergies:  


Coded Allergies:  


     IODINATED CONTRAST MEDIA - IV DYE (Verified  Allergy, Severe, Shortness of 

Breath, 9/16/15)


     DORIPENEM (Verified  Allergy, Intermediate, Itching, 9/16/15)


     KETOROLAC TROMETHAMINE (Verified  Allergy, Intermediate, Hives, 9/16/15)


     TRAMADOL (Verified  Allergy, Intermediate, Hives, 9/16/15)


     ASPIRIN (Verified  Allergy, Mild, 12/7/10)





Objective


Vital Signs





Last 24 Hour Vital Signs








  Date Time  Temp Pulse Resp B/P Pulse Ox O2 Delivery O2 Flow Rate FiO2


 


5/15/17 15:52 97.7 76 19 132/67 100 Room Air  


 


5/15/17 15:32 97.7       


 


5/15/17 12:22 97.7 78 19 145/71 100 Room Air  


 


5/15/17 09:05    134/78    


 


5/15/17 09:04  90  134/78    


 


5/15/17 08:00 97.7 90 18 134/78 100 Room Air  


 


5/15/17 04:00 97.6 70 18 126/78 95 Room Air  


 


5/14/17 23:40 97.7 72 18 109/54 94 Room Air  


 


5/14/17 20:00 97.9 82 18 123/75 96 Room Air  








Height (Feet):  6


Height (Inches):  2.00


Weight (Pounds):  226


General Appearance:  WD/WN, no acute distress


HEENT:  normocephalic, atraumatic, anicteric, mucous membranes moist


Respiratory/Chest:  chest wall non-tender, lungs clear, normal breath sounds, 

no respiratory distress, no accessory muscle use


Cardiovascular:  normal peripheral pulses, normal rate, regular rhythm, 

regularly irregular, no gallop/murmur


Abdomen:  normal bowel sounds, soft, non tender, no organomegaly, non distended

, no mass


Extremities:  no cyanosis, no clubbing


Skin:  no rash, no lesions





Laboratory Tests








Test


  5/15/17


03:30


 


White Blood Count


  4.2 K/UL


(4.8-10.8)  L


 


Red Blood Count


  3.92 M/UL


(4.70-6.10)  L


 


Hemoglobin


  11.1 G/DL


(14.2-18.0)  L


 


Hematocrit


  34.0 %


(42.0-52.0)  L


 


Mean Corpuscular Volume 87 FL (80-99)  


 


Mean Corpuscular Hemoglobin


  28.3 PG


(27.0-31.0)


 


Mean Corpuscular Hemoglobin


Concent 32.6 G/DL


(32.0-36.0)


 


Red Cell Distribution Width


  15.2 %


(11.6-14.8)  H


 


Platelet Count


  137 K/UL


(150-450)  L


 


Mean Platelet Volume


  7.5 FL


(6.5-10.1)


 


Neutrophils (%) (Auto)


  43.3 %


(45.0-75.0)  L


 


Lymphocytes (%) (Auto)


  42.7 %


(20.0-45.0)


 


Monocytes (%) (Auto)


  7.8 %


(1.0-10.0)


 


Eosinophils (%) (Auto)


  4.9 %


(0.0-3.0)  H


 


Basophils (%) (Auto)


  1.2 %


(0.0-2.0)











Current Medications








 Medications


  (Trade)  Dose


 Ordered  Sig/Kirsten


 Route


 PRN Reason  Start Time


 Stop Time Status Last Admin


Dose Admin


 


 Acetaminophen


  (Tylenol)  650 mg  Q4H  PRN


 ORAL


 fever  5/12/17 23:15


 6/11/17 23:14   


 


 


 Al Hydroxide/Mg


 Hydroxide


  (Mylanta II)  30 ml  Q6H  PRN


 ORAL


 dyspepsia  5/12/17 23:15


 6/11/17 23:14   


 


 


 Carvedilol


  (Coreg)  6.25 mg  DAILY


 ORAL


   5/13/17 09:00


 6/12/17 08:59  5/15/17 09:04


 


 


 Ceftriaxone


 Sodium/Dextrose


  (Rocephin/D5W)  55 ml @ 


 110 mls/hr  Q24H


 IVPB


   5/13/17 15:00


 5/20/17 14:59  5/15/17 13:57


 


 


 Dextrose     STAT  PRN


 IV


 Hypoglycemia  5/12/17 23:15


 6/11/17 23:14   


 


 


 Dextrose/Sodium


 Chloride


  (D5ns)  1,000 ml @ 


 100 mls/hr  Q10H


 IV


   5/13/17 00:00


 6/12/17 00:00  5/15/17 11:20


 


 


 Diphenhydramine


 HCl 25 mg  25 mg  Q6H  PRN


 IVP


 Itching  5/13/17 12:00


 6/12/17 11:59  5/15/17 15:03


 


 


 Enalapril Maleate


  (Vasotec)  10 mg  DAILY


 ORAL


   5/13/17 09:00


 6/12/17 08:59  5/15/17 09:05


 


 


 Morphine Sulfate


  (Morphine


 Sulfate)  4 mg  Q4H  PRN


 IVP


 For Pain  5/13/17 01:00


 5/20/17 00:59  5/15/17 15:02


 


 


 Nitroglycerin


  (Ntg)  0.4 mg  Q5M X 3 DOSES PRN


 SL


 Prn Chest Pain  5/12/17 23:15


 6/11/17 23:14   


 


 


 Ondansetron HCl


  (Zofran)  4 mg  Q6H  PRN


 IVP


 Nausea & Vomiting  5/12/17 23:15


 6/11/17 23:14  5/15/17 15:03


 


 


 Pantoprazole


  (Protonix)  40 mg  DAILY


 ORAL


   5/13/17 09:00


 6/12/17 08:59  5/15/17 09:04


 


 


 Polyethylene


 Glycol


  (Miralax)  17 gm  HSPRN  PRN


 ORAL


 Constipation  5/12/17 23:15


 6/11/17 23:14   


 


 


 Tamsulosin HCl


  (Flomax)  0.4 mg  BEDTIME


 ORAL


   5/13/17 21:00


 6/12/17 20:59  5/14/17 20:41


 


 


 Temazepam


  (Restoril)  15 mg  HSPRN  PRN


 ORAL


 Insomnia  5/12/17 23:15


 5/19/17 23:14   


 

















Eitan Cantu M.D. May 15, 2017 16:43

## 2017-05-16 NOTE — GENERAL PROGRESS NOTE
Assessment/Plan


Assessment/Plan


IMPRESSION:


1. Hypercoagulable state. Currently off coumadin given hemoptysis, has a IVC 

filter in place. Okay to d/c from heme perspective, restart coumadin after seen 

in office


2. History of DVT.


3. History of pulmonary emboli.


4. Status post IVC filter placement.


5. Anticoagulation with Coumadin.


6. History of GI bleed.


7. History of hematuria.


8. Diabetes mellitus.


9. Drug-seeking behavior.


10. Chronic pain syndrome.





RECOMMENDATIONS:


1. Watch counts.


2. Watch coagulopathy.


3. No clots noted on lower extremities


4. Gastroenterologist evaluation.


5. PRBC transfusion on a p.r.n. basis.


6. Platelets transfusion on a p.r.n. basis.


7. Off coumadin


8. Urology eval


9. Skin care.


10. Close followup.





Subjective


Constitutional:  Reports: no symptoms


HEENT:  Reports: no symptoms


Cardiovascular:  Reports: no symptoms


Gastrointestinal/Abdominal:  Reports: poor fluid intake


Genitourinary:  Reports: no symptoms


Neurologic/Psychiatric:  Reports: no symptoms


Endocrine:  Reports: no symptoms


Hematologic/Lymphatic:  Reports: anemia


Allergies:  


Coded Allergies:  


     IODINATED CONTRAST MEDIA - IV DYE (Verified  Allergy, Severe, Shortness of 

Breath, 9/16/15)


     DORIPENEM (Verified  Allergy, Intermediate, Itching, 9/16/15)


     KETOROLAC TROMETHAMINE (Verified  Allergy, Intermediate, Hives, 9/16/15)


     TRAMADOL (Verified  Allergy, Intermediate, Hives, 9/16/15)


     ASPIRIN (Verified  Allergy, Mild, 12/7/10)


Subjective


stable, no events, no fevers or chills reported, on coumadin





Objective





Last 24 Hour Vital Signs








  Date Time  Temp Pulse Resp B/P Pulse Ox O2 Delivery O2 Flow Rate FiO2


 


5/16/17 16:00 97.7 81 18 128/78 99 Room Air  


 


5/16/17 12:19 97.7 62 16 139/74 98 Room Air  


 


5/16/17 09:07    123/78    


 


5/16/17 09:07  79  123/78    


 


5/16/17 07:41 97.3 79 24 123/78 98   


 


5/16/17 00:00 98.1 71 20 131/69 99 Room Air  


 


5/15/17 20:00 96.8 64 20 134/74 98 Room Air  

















Intake and Output  


 


 5/15/17 5/16/17





 19:00 07:00


 


Intake Total 1840 ml 900 ml


 


Balance 1840 ml 900 ml


 


  


 


Intake Oral 840 ml 


 


IV Total 1000 ml 900 ml








Laboratory Tests


5/16/17 10:40: 


White Blood Count 3.6L, Red Blood Count 4.25L, Hemoglobin 11.4L, Hematocrit 

37.5L, Mean Corpuscular Volume 88, Mean Corpuscular Hemoglobin 26.8L, Mean 

Corpuscular Hemoglobin Concent 30.4L, Red Cell Distribution Width 15.3H, 

Platelet Count 139L, Mean Platelet Volume 7.9, Neutrophils (%) (Auto) 34.4L, 

Lymphocytes (%) (Auto) 46.6H, Monocytes (%) (Auto) 11.6H, Eosinophils (%) (Auto

) 5.2H, Basophils (%) (Auto) 2.3H, Sodium Level 138, Potassium Level 4.0, 

Chloride Level 101, Carbon Dioxide Level 26, Anion Gap 11, Blood Urea Nitrogen 

13, Creatinine 1.3H, Estimat Glomerular Filtration Rate > 60, Glucose Level 88, 

Calcium Level 9.0


Height (Feet):  6


Height (Inches):  2.00


Weight (Pounds):  226


General Appearance:  alert


EENT:  normal ENT inspection


Neck:  supple


Cardiovascular:  regular rhythm


Respiratory/Chest:  normal breath sounds


Abdomen:  normal bowel sounds


Extremities:  non-tender


Edema:  no edema noted Leg (L), no edema noted Leg (R)


Neurologic:  alert


Skin:  warm/dry











Jb Parmar May 16, 2017 18:22

## 2017-05-16 NOTE — PULMONOLOGY PROGRESS NOTE
Subjective


Allergies:  


Coded Allergies:  


     IODINATED CONTRAST MEDIA - IV DYE (Verified  Allergy, Severe, Shortness of 

Breath, 9/16/15)


     DORIPENEM (Verified  Allergy, Intermediate, Itching, 9/16/15)


     KETOROLAC TROMETHAMINE (Verified  Allergy, Intermediate, Hives, 9/16/15)


     TRAMADOL (Verified  Allergy, Intermediate, Hives, 9/16/15)


     ASPIRIN (Verified  Allergy, Mild, 12/7/10)





Objective





Last 24 Hour Vital Signs








  Date Time  Temp Pulse Resp B/P Pulse Ox O2 Delivery O2 Flow Rate FiO2


 


5/16/17 20:28 97.9 72 20 146/90 99 Room Air  


 


5/16/17 16:00 97.7 81 18 128/78 99 Room Air  


 


5/16/17 12:19 97.7 62 16 139/74 98 Room Air  


 


5/16/17 09:07    123/78    


 


5/16/17 09:07  79  123/78    


 


5/16/17 07:41 97.3 79 24 123/78 98   


 


5/16/17 00:00 98.1 71 20 131/69 99 Room Air  

















Intake and Output  


 


 5/15/17 5/16/17





 19:00 07:00


 


Intake Total 1840 ml 900 ml


 


Balance 1840 ml 900 ml


 


  


 


Intake Oral 840 ml 


 


IV Total 1000 ml 900 ml








Laboratory Tests


5/16/17 10:40: 


White Blood Count 3.6L, Red Blood Count 4.25L, Hemoglobin 11.4L, Hematocrit 

37.5L, Mean Corpuscular Volume 88, Mean Corpuscular Hemoglobin 26.8L, Mean 

Corpuscular Hemoglobin Concent 30.4L, Red Cell Distribution Width 15.3H, 

Platelet Count 139L, Mean Platelet Volume 7.9, Neutrophils (%) (Auto) 34.4L, 

Lymphocytes (%) (Auto) 46.6H, Monocytes (%) (Auto) 11.6H, Eosinophils (%) (Auto

) 5.2H, Basophils (%) (Auto) 2.3H, Sodium Level 138, Potassium Level 4.0, 

Chloride Level 101, Carbon Dioxide Level 26, Anion Gap 11, Blood Urea Nitrogen 

13, Creatinine 1.3H, Estimat Glomerular Filtration Rate > 60, Glucose Level 88, 

Calcium Level 9.0





Current Medications








 Medications


  (Trade)  Dose


 Ordered  Sig/Kirsten


 Route


 PRN Reason  Start Time


 Stop Time Status Last Admin


Dose Admin


 


 Acetaminophen


  (Tylenol)  650 mg  Q4H  PRN


 ORAL


 fever  5/12/17 23:15


 6/11/17 23:14   


 


 


 Al Hydroxide/Mg


 Hydroxide


  (Mylanta II)  30 ml  Q6H  PRN


 ORAL


 dyspepsia  5/12/17 23:15


 6/11/17 23:14   


 


 


 Carvedilol


  (Coreg)  6.25 mg  DAILY


 ORAL


   5/13/17 09:00


 6/12/17 08:59  5/16/17 09:07


 


 


 Ceftriaxone


 Sodium/Dextrose


  (Rocephin/D5W)  55 ml @ 


 110 mls/hr  Q24H


 IVPB


   5/13/17 15:00


 5/20/17 14:59  5/16/17 15:00


 


 


 Dextrose     STAT  PRN


 IV


 Hypoglycemia  5/12/17 23:15


 6/11/17 23:14   


 


 


 Dextrose/Sodium


 Chloride


  (D5ns)  1,000 ml @ 


 100 mls/hr  Q10H


 IV


   5/13/17 00:00


 6/12/17 00:00  5/16/17 17:58


 


 


 Diphenhydramine


 HCl 25 mg  25 mg  Q6H  PRN


 IVP


 Itching  5/13/17 12:00


 6/12/17 11:59  5/16/17 20:59


 


 


 Enalapril Maleate


  (Vasotec)  10 mg  DAILY


 ORAL


   5/13/17 09:00


 6/12/17 08:59  5/16/17 09:07


 


 


 Morphine Sulfate


  (Morphine


 Sulfate)  4 mg  Q4H  PRN


 IVP


 For Pain  5/13/17 01:00


 5/20/17 00:59  5/16/17 21:00


 


 


 Nitroglycerin


  (Ntg)  0.4 mg  Q5M X 3 DOSES PRN


 SL


 Prn Chest Pain  5/12/17 23:15


 6/11/17 23:14   


 


 


 Ondansetron HCl


  (Zofran)  4 mg  Q6H  PRN


 IVP


 Nausea & Vomiting  5/12/17 23:15


 6/11/17 23:14  5/16/17 20:59


 


 


 Pantoprazole


  (Protonix)  40 mg  DAILY


 ORAL


   5/13/17 09:00


 6/12/17 08:59  5/16/17 09:07


 


 


 Polyethylene


 Glycol


  (Miralax)  17 gm  HSPRN  PRN


 ORAL


 Constipation  5/12/17 23:15


 6/11/17 23:14   


 


 


 Tamsulosin HCl


  (Flomax)  0.4 mg  BEDTIME


 ORAL


   5/13/17 21:00


 6/12/17 20:59  5/16/17 20:58


 


 


 Temazepam


  (Restoril)  15 mg  HSPRN  PRN


 ORAL


 Insomnia  5/12/17 23:15


 5/19/17 23:14   


 

















CHAVO DUNBAR May 16, 2017 22:26

## 2017-05-16 NOTE — GENERAL PROGRESS NOTE
Assessment/Plan


Problem List:  


(1) Acute GI bleeding


ICD Codes:  K92.2 - Gastrointestinal hemorrhage, unspecified


SNOMED:  09784218


(2) HTN (hypertension)


ICD Codes:  I10 - HTN (hypertension)


SNOMED:  47625160


(3) Gastroparesis


ICD Codes:  K31.84 - Gastroparesis


SNOMED:  592227582


(4) Intractable abdominal pain


ICD Codes:  R10.9 - Intractable abdominal pain


SNOMED:  60516799


(5) DVT (deep venous thrombosis)


ICD Codes:  I82.409 - Acute embolism and thombos unsp deep vn unsp lower 

extremity


SNOMED:  953484475


(6) CHF (congestive heart failure)


ICD Codes:  I50.9 - Heart failure, unspecified


SNOMED:  74964213


Status:  stable, progressing, tolerating diet


Assessment/Plan


gi pain f/u cbc bmp am dc plan





Subjective


Constitutional:  Reports: weakness


Gastrointestinal/Abdominal:  Reports: nausea


Allergies:  


Coded Allergies:  


     IODINATED CONTRAST MEDIA - IV DYE (Verified  Allergy, Severe, Shortness of 

Breath, 9/16/15)


     DORIPENEM (Verified  Allergy, Intermediate, Itching, 9/16/15)


     KETOROLAC TROMETHAMINE (Verified  Allergy, Intermediate, Hives, 9/16/15)


     TRAMADOL (Verified  Allergy, Intermediate, Hives, 9/16/15)


     ASPIRIN (Verified  Allergy, Mild, 12/7/10)


All Systems:  reviewed and negative except above


Subjective


anxious





Objective





Last 24 Hour Vital Signs








  Date Time  Temp Pulse Resp B/P Pulse Ox O2 Delivery O2 Flow Rate FiO2


 


5/16/17 12:19 97.7 62 16 139/74 98 Room Air  


 


5/16/17 09:07    123/78    


 


5/16/17 09:07  79  123/78    


 


5/16/17 07:41 97.3 79 24 123/78 98   


 


5/16/17 00:00 98.1 71 20 131/69 99 Room Air  


 


5/15/17 20:00 96.8 64 20 134/74 98 Room Air  


 


5/15/17 15:52 97.7 76 19 132/67 100 Room Air  


 


5/15/17 15:32 97.7       

















Intake and Output  


 


 5/15/17 5/16/17





 19:00 07:00


 


Intake Total 1840 ml 900 ml


 


Balance 1840 ml 900 ml


 


  


 


Intake Oral 840 ml 


 


IV Total 1000 ml 900 ml








Laboratory Tests


5/16/17 10:40: 


White Blood Count 3.6L, Red Blood Count 4.25L, Hemoglobin 11.4L, Hematocrit 

37.5L, Mean Corpuscular Volume 88, Mean Corpuscular Hemoglobin 26.8L, Mean 

Corpuscular Hemoglobin Concent 30.4L, Red Cell Distribution Width 15.3H, 

Platelet Count 139L, Mean Platelet Volume 7.9, Neutrophils (%) (Auto) 34.4L, 

Lymphocytes (%) (Auto) 46.6H, Monocytes (%) (Auto) 11.6H, Eosinophils (%) (Auto

) 5.2H, Basophils (%) (Auto) 2.3H, Sodium Level 138, Potassium Level 4.0, 

Chloride Level 101, Carbon Dioxide Level 26, Anion Gap 11, Blood Urea Nitrogen 

13, Creatinine 1.3H, Estimat Glomerular Filtration Rate > 60, Glucose Level 88, 

Calcium Level 9.0


Height (Feet):  6


Height (Inches):  2.00


Weight (Pounds):  226


General Appearance:  alert


EENT:  normal ENT inspection


Neck:  non-tender, normal alignment, supple


Cardiovascular:  normal peripheral pulses, normal rate, regular rhythm


Respiratory/Chest:  chest wall non-tender, lungs clear, normal breath sounds


Abdomen:  normal bowel sounds, non tender, soft


Extremities:  normal inspection


Edema:  no edema noted Arm (L), no edema noted Arm (R), no edema noted Leg (L), 

no edema noted Leg (R), no edema noted Pedal (L), no edema noted Pedal (R), no 

edema noted Generalized


Neurologic:  responsive, motor weakness


Skin:  normal pigmentation, warm/dry











TUCKER GUILLERMO May 16, 2017 14:56

## 2017-05-16 NOTE — GI PROGRESS NOTE
Assessment/Plan


Problems:  


(1) Denies alcohol consumption


ICD Codes:  Z78.9 - Other specified health status


SNOMED:  641498336


(2) Acute GI bleeding


ICD Codes:  K92.2 - Gastrointestinal hemorrhage, unspecified


SNOMED:  72159414


(3) Abdominal pain


ICD Codes:  R10.9 - Unspecified abdominal pain


SNOMED:  45463430


(4) Gastritis


ICD Codes:  K29.70 - Gastritis


SNOMED:  2790797


(5) Hemoptysis


ICD Codes:  R04.2 - Hemoptysis


SNOMED:  37104336


(6) Iron deficiency


ICD Codes:  E61.1 - Iron deficiency


SNOMED:  38784485


(7) Abdominal pain of unknown etiology


ICD Codes:  R10.9 - Unspecified abdominal pain


SNOMED:  509341290


Status:  stable


Status Narrative


Discussed with Dr. Cleveland.


Assessment/Plan


s/p colonoscopy with unremarkable results per patient.


OB stool negative


abdominal U/S >> unremarkable


AFP WNL


stable H&H


HIV+





ok for DC per GI standpoint


cardiac diet, tolerating


ppi


monitor H&H


fu hep panel


fu labs





Subjective


Subjective


hemoptysis +





Objective





Last 24 Hour Vital Signs








  Date Time  Temp Pulse Resp B/P Pulse Ox O2 Delivery O2 Flow Rate FiO2


 


5/16/17 09:07    123/78    


 


5/16/17 09:07  79  123/78    


 


5/16/17 07:41 97.3 79 24 123/78 98   


 


5/16/17 00:00 98.1 71 20 131/69 99 Room Air  


 


5/15/17 20:00 96.8 64 20 134/74 98 Room Air  


 


5/15/17 15:52 97.7 76 19 132/67 100 Room Air  


 


5/15/17 15:32 97.7       


 


5/15/17 12:22 97.7 78 19 145/71 100 Room Air  

















Intake and Output  


 


 5/15/17 5/16/17





 19:00 07:00


 


Intake Total 1840 ml 900 ml


 


Balance 1840 ml 900 ml


 


  


 


Intake Oral 840 ml 


 


IV Total 1000 ml 900 ml








Height (Feet):  6


Height (Inches):  2.00


Weight (Pounds):  226


General Appearance:  no apparent distress, alert


Cardiovascular:  normal rate


Respiratory/Chest:  normal breath sounds, no respiratory distress


Abdominal Exam:  normal bowel sounds, non tender, soft


Extremities:  normal range of motion - ambulates around unit











Yashira Locke N.P. May 16, 2017 09:46

## 2017-05-17 NOTE — GENERAL PROGRESS NOTE
Assessment/Plan


Problem List:  


(1) Acute GI bleeding


ICD Codes:  K92.2 - Gastrointestinal hemorrhage, unspecified


SNOMED:  07178248


(2) HTN (hypertension)


ICD Codes:  I10 - HTN (hypertension)


SNOMED:  97159420


(3) Gastroparesis


ICD Codes:  K31.84 - Gastroparesis


SNOMED:  317557798


(4) Intractable abdominal pain


ICD Codes:  R10.9 - Intractable abdominal pain


SNOMED:  05808255


(5) DVT (deep venous thrombosis)


ICD Codes:  I82.409 - Acute embolism and thombos unsp deep vn unsp lower 

extremity


SNOMED:  082271659


(6) CHF (congestive heart failure)


ICD Codes:  I50.9 - Heart failure, unspecified


SNOMED:  13178973


Status:  stable, progressing, tolerating diet


Assessment/Plan


gi pain f/u dc home





Subjective


Constitutional:  Reports: weakness


Allergies:  


Coded Allergies:  


     IODINATED CONTRAST MEDIA - IV DYE (Verified  Allergy, Severe, Shortness of 

Breath, 9/16/15)


     DORIPENEM (Verified  Allergy, Intermediate, Itching, 9/16/15)


     KETOROLAC TROMETHAMINE (Verified  Allergy, Intermediate, Hives, 9/16/15)


     TRAMADOL (Verified  Allergy, Intermediate, Hives, 9/16/15)


     ASPIRIN (Verified  Allergy, Mild, 12/7/10)


All Systems:  reviewed and negative except above


Subjective


anxious





Objective





Last 24 Hour Vital Signs








  Date Time  Temp Pulse Resp B/P Pulse Ox O2 Delivery O2 Flow Rate FiO2


 


5/17/17 04:45 97.3 64 20 125/67 97 Room Air  


 


5/17/17 00:22 96.3 75 20 129/86 97 Room Air  


 


5/16/17 20:28 97.9 72 20 146/90 99 Room Air  


 


5/16/17 16:00 97.7 81 18 128/78 99 Room Air  


 


5/16/17 12:19 97.7 62 16 139/74 98 Room Air  


 


5/16/17 09:07    123/78    


 


5/16/17 09:07  79  123/78    


 


5/16/17 07:41 97.3 79 24 123/78 98   

















Intake and Output  


 


 5/16/17 5/17/17





 19:00 07:00


 


Intake Total 1465 ml 1100 ml


 


Balance 1465 ml 1100 ml


 


  


 


Intake Oral 360 ml 


 


IV Total 1105 ml 1100 ml


 


# Voids 2 3


 


# Bowel Movements  1








Laboratory Tests


5/16/17 10:40: 


White Blood Count 3.6L, Red Blood Count 4.25L, Hemoglobin 11.4L, Hematocrit 

37.5L, Mean Corpuscular Volume 88, Mean Corpuscular Hemoglobin 26.8L, Mean 

Corpuscular Hemoglobin Concent 30.4L, Red Cell Distribution Width 15.3H, 

Platelet Count 139L, Mean Platelet Volume 7.9, Neutrophils (%) (Auto) 34.4L, 

Lymphocytes (%) (Auto) 46.6H, Monocytes (%) (Auto) 11.6H, Eosinophils (%) (Auto

) 5.2H, Basophils (%) (Auto) 2.3H, Sodium Level 138, Potassium Level 4.0, 

Chloride Level 101, Carbon Dioxide Level 26, Anion Gap 11, Blood Urea Nitrogen 

13, Creatinine 1.3H, Estimat Glomerular Filtration Rate > 60, Glucose Level 88, 

Calcium Level 9.0


Height (Feet):  6


Height (Inches):  2.00


Weight (Pounds):  226


General Appearance:  alert


EENT:  normal ENT inspection


Neck:  normal alignment


Cardiovascular:  normal peripheral pulses, normal rate, regular rhythm


Respiratory/Chest:  chest wall non-tender, lungs clear, normal breath sounds


Abdomen:  normal bowel sounds, non tender, soft


Extremities:  normal inspection


Edema:  no edema noted Arm (L), no edema noted Arm (R), no edema noted Leg (L), 

no edema noted Leg (R), no edema noted Pedal (L), no edema noted Pedal (R), no 

edema noted Generalized


Neurologic:  responsive, motor weakness


Skin:  normal pigmentation, warm/dry











TUCKER GUILLERMO May 17, 2017 06:39

## 2017-05-17 NOTE — INFECTIOUS DISEASES PROG NOTE
Assessment/Plan


Problems:  


(1) UTI (urinary tract infection)


Assessment & Plan:  improved with ceftriaxon , renal US showed nonobstructing 

stone, will D/C ceftriaxon, recommend urology follow up





(2) HIV (human immunodeficiency virus infection)


Assessment & Plan:  with low CD4 , await viral load to confirm , and genotype . 

patient was counseled regarding safe sex practice, and he is required to notify 

his wife of his HIV status .infection control is aware of the HIV status. 

recommend follow up with HIV provider as an outpatient to start ART , patient 

understands the benefits of starting ART.  





(3) GI bleeding


Assessment & Plan:  resolved, had abdominal US , which showed no significant 

pathology, Gi is following 








Subjective


Allergies:  


Coded Allergies:  


     IODINATED CONTRAST MEDIA - IV DYE (Verified  Allergy, Severe, Shortness of 

Breath, 9/16/15)


     DORIPENEM (Verified  Allergy, Intermediate, Itching, 9/16/15)


     KETOROLAC TROMETHAMINE (Verified  Allergy, Intermediate, Hives, 9/16/15)


     TRAMADOL (Verified  Allergy, Intermediate, Hives, 9/16/15)


     ASPIRIN (Verified  Allergy, Mild, 12/7/10)





Objective


Vital Signs





Last 24 Hour Vital Signs








  Date Time  Temp Pulse Resp B/P Pulse Ox O2 Delivery O2 Flow Rate FiO2


 


5/17/17 11:37 97.2 68 14 133/72 98 Room Air  


 


5/17/17 09:10    126/82    


 


5/17/17 09:10  69  126/82    


 


5/17/17 07:56 97.7 69 14 126/82 97 Room Air 97.0 


 


5/17/17 04:45 97.3 64 20 125/67 97 Room Air  


 


5/17/17 00:22 96.3 75 20 129/86 97 Room Air  


 


5/16/17 20:28 97.9 72 20 146/90 99 Room Air  


 


5/16/17 16:00 97.7 81 18 128/78 99 Room Air  








Height (Feet):  6


Height (Inches):  2.00


Weight (Pounds):  226


General Appearance:  WD/WN, no acute distress


HEENT:  normocephalic, atraumatic, anicteric, mucous membranes moist


Respiratory/Chest:  chest wall non-tender, lungs clear, normal breath sounds, 

no respiratory distress, no accessory muscle use


Cardiovascular:  normal peripheral pulses, normal rate, regular rhythm, no 

gallop/murmur, no JVD


Abdomen:  normal bowel sounds, soft, non tender, no organomegaly, non distended

, no mass


Extremities:  no cyanosis, no clubbing


Skin:  no rash, no lesions





Laboratory Tests








Test


  5/17/17


07:10


 


White Blood Count


  4.2 K/UL


(4.8-10.8)  L


 


Red Blood Count


  4.20 M/UL


(4.70-6.10)  L


 


Hemoglobin


  11.5 G/DL


(14.2-18.0)  L


 


Hematocrit


  37.2 %


(42.0-52.0)  L


 


Mean Corpuscular Volume 88 FL (80-99)  


 


Mean Corpuscular Hemoglobin


  27.3 PG


(27.0-31.0)


 


Mean Corpuscular Hemoglobin


Concent 30.8 G/DL


(32.0-36.0)  L


 


Red Cell Distribution Width


  15.3 %


(11.6-14.8)  H


 


Platelet Count


  149 K/UL


(150-450)  L


 


Mean Platelet Volume


  8.1 FL


(6.5-10.1)


 


Neutrophils (%) (Auto)


  33.8 %


(45.0-75.0)  L


 


Lymphocytes (%) (Auto)


  46.1 %


(20.0-45.0)  H


 


Monocytes (%) (Auto)


  12.4 %


(1.0-10.0)  H


 


Eosinophils (%) (Auto)


  5.9 %


(0.0-3.0)  H


 


Basophils (%) (Auto)


  1.8 %


(0.0-2.0)


 


Sodium Level


  141 mEQ/L


(135-145)


 


Potassium Level


  4.0 mEQ/L


(3.4-4.9)


 


Chloride Level


  103 mEQ/L


()


 


Carbon Dioxide Level


  26 mEQ/L


(20-30)


 


Anion Gap 12 (5-15)  


 


Blood Urea Nitrogen


  14 mg/dL


(7-23)


 


Creatinine


  1.4 mg/dL


(0.7-1.2)  H


 


Estimat Glomerular Filtration


Rate > 60 mL/min


(>60)


 


Glucose Level


  112 mg/dL


()  H


 


Calcium Level


  9.1 mg/dL


(8.6-10.2)











Current Medications








 Medications


  (Trade)  Dose


 Ordered  Sig/Kirsten


 Route


 PRN Reason  Start Time


 Stop Time Status Last Admin


Dose Admin


 


 Acetaminophen


  (Tylenol)  650 mg  Q4H  PRN


 ORAL


 fever  5/12/17 23:15


 6/11/17 23:14   


 


 


 Al Hydroxide/Mg


 Hydroxide


  (Mylanta II)  30 ml  Q6H  PRN


 ORAL


 dyspepsia  5/12/17 23:15


 6/11/17 23:14   


 


 


 Carvedilol


  (Coreg)  6.25 mg  DAILY


 ORAL


   5/13/17 09:00


 6/12/17 08:59  5/17/17 09:10


 


 


 Ceftriaxone


 Sodium/Dextrose


  (Rocephin/D5W)  55 ml @ 


 110 mls/hr  Q24H


 IVPB


   5/13/17 15:00


 5/20/17 14:59  5/16/17 15:00


 


 


 Dextrose     STAT  PRN


 IV


 Hypoglycemia  5/12/17 23:15


 6/11/17 23:14   


 


 


 Dextrose/Sodium


 Chloride


  (D5ns)  1,000 ml @ 


 100 mls/hr  Q10H


 IV


   5/13/17 00:00


 6/12/17 00:00  5/17/17 03:01


 


 


 Diphenhydramine


 HCl 25 mg  25 mg  Q6H  PRN


 IVP


 Itching  5/13/17 12:00


 6/12/17 11:59  5/17/17 09:11


 


 


 Enalapril Maleate


  (Vasotec)  10 mg  DAILY


 ORAL


   5/13/17 09:00


 6/12/17 08:59  5/17/17 09:10


 


 


 Morphine Sulfate


  (Morphine


 Sulfate)  4 mg  Q4H  PRN


 IVP


 For Pain  5/13/17 01:00


 5/20/17 00:59  5/17/17 09:11


 


 


 Nitroglycerin


  (Ntg)  0.4 mg  Q5M X 3 DOSES PRN


 SL


 Prn Chest Pain  5/12/17 23:15


 6/11/17 23:14   


 


 


 Ondansetron HCl


  (Zofran)  4 mg  Q6H  PRN


 IVP


 Nausea & Vomiting  5/12/17 23:15


 6/11/17 23:14  5/17/17 09:11


 


 


 Pantoprazole


  (Protonix)  40 mg  DAILY


 ORAL


   5/13/17 09:00


 6/12/17 08:59  5/17/17 09:10


 


 


 Polyethylene


 Glycol


  (Miralax)  17 gm  HSPRN  PRN


 ORAL


 Constipation  5/12/17 23:15


 6/11/17 23:14   


 


 


 Tamsulosin HCl


  (Flomax)  0.4 mg  BEDTIME


 ORAL


   5/13/17 21:00


 6/12/17 20:59  5/16/17 20:58


 


 


 Temazepam


  (Restoril)  15 mg  HSPRN  PRN


 ORAL


 Insomnia  5/12/17 23:15


 5/19/17 23:14   


 

















Eitan Cantu M.D. May 17, 2017 14:56

## 2017-05-17 NOTE — PULMONOLOGY PROGRESS NOTE
Subjective


Allergies:  


Coded Allergies:  


     IODINATED CONTRAST MEDIA - IV DYE (Verified  Allergy, Severe, Shortness of 

Breath, 9/16/15)


     DORIPENEM (Verified  Allergy, Intermediate, Itching, 9/16/15)


     KETOROLAC TROMETHAMINE (Verified  Allergy, Intermediate, Hives, 9/16/15)


     TRAMADOL (Verified  Allergy, Intermediate, Hives, 9/16/15)


     ASPIRIN (Verified  Allergy, Mild, 12/7/10)





Objective





Last 24 Hour Vital Signs








  Date Time  Temp Pulse Resp B/P Pulse Ox O2 Delivery O2 Flow Rate FiO2


 


5/17/17 16:00 97.5 74 18 141/98 96 Room Air  


 


5/17/17 11:37 97.2 68 14 133/72 98 Room Air  


 


5/17/17 09:10    126/82    


 


5/17/17 09:10  69  126/82    


 


5/17/17 07:56 97.7 69 14 126/82 97 Room Air 97.0 


 


5/17/17 04:45 97.3 64 20 125/67 97 Room Air  


 


5/17/17 00:22 96.3 75 20 129/86 97 Room Air  

















Intake and Output  


 


 5/16/17 5/17/17





 19:00 07:00


 


Intake Total 1465 ml 1200 ml


 


Balance 1465 ml 1200 ml


 


  


 


Intake Oral 360 ml 


 


IV Total 1105 ml 1200 ml


 


# Voids 2 3


 


# Bowel Movements  1








Laboratory Tests


5/17/17 07:10: 


White Blood Count 4.2L, Red Blood Count 4.20L, Hemoglobin 11.5L, Hematocrit 

37.2L, Mean Corpuscular Volume 88, Mean Corpuscular Hemoglobin 27.3, Mean 

Corpuscular Hemoglobin Concent 30.8L, Red Cell Distribution Width 15.3H, 

Platelet Count 149L, Mean Platelet Volume 8.1, Neutrophils (%) (Auto) 33.8L, 

Lymphocytes (%) (Auto) 46.1H, Monocytes (%) (Auto) 12.4H, Eosinophils (%) (Auto

) 5.9H, Basophils (%) (Auto) 1.8, Sodium Level 141, Potassium Level 4.0, 

Chloride Level 103, Carbon Dioxide Level 26, Anion Gap 12, Blood Urea Nitrogen 

14, Creatinine 1.4H, Estimat Glomerular Filtration Rate > 60, Glucose Level 112H

, Calcium Level 9.1











CHAVO DUNBAR May 17, 2017 22:28

## 2017-05-17 NOTE — GI PROGRESS NOTE
Assessment/Plan


Problems:  


(1) Denies alcohol consumption


ICD Codes:  Z78.9 - Other specified health status


SNOMED:  923444307


(2) Acute GI bleeding


ICD Codes:  K92.2 - Gastrointestinal hemorrhage, unspecified


SNOMED:  64479389


(3) Abdominal pain


ICD Codes:  R10.9 - Unspecified abdominal pain


SNOMED:  77376860


(4) Gastritis


ICD Codes:  K29.70 - Gastritis


SNOMED:  8868273


(5) Hemoptysis


ICD Codes:  R04.2 - Hemoptysis


SNOMED:  59061605


(6) Iron deficiency


ICD Codes:  E61.1 - Iron deficiency


SNOMED:  49563861


(7) Abdominal pain of unknown etiology


ICD Codes:  R10.9 - Unspecified abdominal pain


SNOMED:  633328375


Status:  stable


Status Narrative


Discussed with Dr. Cleveland.


Assessment/Plan


s/p colonoscopy with unremarkable results per patient.


OB stool negative


abdominal U/S >> unremarkable


AFP WNL


stable H&H


HIV+





ok for DC per GI standpoint


cardiac diet, tolerating


ppi


monitor H&H


fu hep panel


fu labs





Subjective


Gastrointestinal/Abdominal:  Reports: no symptoms


Subjective


hemoptysis +





Objective





Last 24 Hour Vital Signs








  Date Time  Temp Pulse Resp B/P Pulse Ox O2 Delivery O2 Flow Rate FiO2


 


5/17/17 09:10    126/82    


 


5/17/17 09:10  69  126/82    


 


5/17/17 07:56 97.7 69 14 126/82 97 Room Air 97.0 


 


5/17/17 04:45 97.3 64 20 125/67 97 Room Air  


 


5/17/17 00:22 96.3 75 20 129/86 97 Room Air  


 


5/16/17 20:28 97.9 72 20 146/90 99 Room Air  


 


5/16/17 16:00 97.7 81 18 128/78 99 Room Air  


 


5/16/17 12:19 97.7 62 16 139/74 98 Room Air  

















Intake and Output  


 


 5/16/17 5/17/17





 19:00 07:00


 


Intake Total 1465 ml 1200 ml


 


Balance 1465 ml 1200 ml


 


  


 


Intake Oral 360 ml 


 


IV Total 1105 ml 1200 ml


 


# Voids 2 3


 


# Bowel Movements  1











Laboratory Tests








Test


  5/17/17


07:10


 


White Blood Count


  4.2 K/UL


(4.8-10.8)  L


 


Red Blood Count


  4.20 M/UL


(4.70-6.10)  L


 


Hemoglobin


  11.5 G/DL


(14.2-18.0)  L


 


Hematocrit


  37.2 %


(42.0-52.0)  L


 


Mean Corpuscular Volume 88 FL (80-99)  


 


Mean Corpuscular Hemoglobin


  27.3 PG


(27.0-31.0)


 


Mean Corpuscular Hemoglobin


Concent 30.8 G/DL


(32.0-36.0)  L


 


Red Cell Distribution Width


  15.3 %


(11.6-14.8)  H


 


Platelet Count


  149 K/UL


(150-450)  L


 


Mean Platelet Volume


  8.1 FL


(6.5-10.1)


 


Neutrophils (%) (Auto)


  33.8 %


(45.0-75.0)  L


 


Lymphocytes (%) (Auto)


  46.1 %


(20.0-45.0)  H


 


Monocytes (%) (Auto)


  12.4 %


(1.0-10.0)  H


 


Eosinophils (%) (Auto)


  5.9 %


(0.0-3.0)  H


 


Basophils (%) (Auto)


  1.8 %


(0.0-2.0)


 


Sodium Level


  141 mEQ/L


(135-145)


 


Potassium Level


  4.0 mEQ/L


(3.4-4.9)


 


Chloride Level


  103 mEQ/L


()


 


Carbon Dioxide Level


  26 mEQ/L


(20-30)


 


Anion Gap 12 (5-15)  


 


Blood Urea Nitrogen


  14 mg/dL


(7-23)


 


Creatinine


  1.4 mg/dL


(0.7-1.2)  H


 


Estimat Glomerular Filtration


Rate > 60 mL/min


(>60)


 


Glucose Level


  112 mg/dL


()  H


 


Calcium Level


  9.1 mg/dL


(8.6-10.2)








Height (Feet):  6


Height (Inches):  2.00


Weight (Pounds):  226


General Appearance:  no apparent distress, alert


Cardiovascular:  normal rate


Respiratory/Chest:  normal breath sounds, no respiratory distress


Abdominal Exam:  normal bowel sounds, non tender, soft


Extremities:  normal range of motion











Yashira Locke N.P. May 17, 2017 11:32

## 2017-05-17 NOTE — GENERAL PROGRESS NOTE
Assessment/Plan


Assessment/Plan


IMPRESSION:


1. Hypercoagulable state. Currently off coumadin given hemoptysis, has a IVC 

filter in place. Okay to d/c from heme perspective, restart coumadin after seen 

in office


2. History of DVT.


3. History of pulmonary emboli.


4. Status post IVC filter placement.


5. Anticoagulation with Coumadin.


6. History of GI bleed.


7. History of hematuria.


8. Diabetes mellitus.


9. Drug-seeking behavior.


10. Chronic pain syndrome.





RECOMMENDATIONS:


1. Watch counts.


2. Watch coagulopathy.


3. No clots noted on lower extremities


4. Gastroenterologist evaluation.


5. PRBC transfusion on a p.r.n. basis.


6. Platelets transfusion on a p.r.n. basis.


7. Off coumadin


8. Urology eval


9. Skin care.


10. Close followup.





Subjective


Constitutional:  Reports: no symptoms


HEENT:  Reports: no symptoms


Cardiovascular:  Reports: no symptoms


Respiratory:  Reports: no symptoms


Gastrointestinal/Abdominal:  Reports: no symptoms


Genitourinary:  Reports: no symptoms


Neurologic/Psychiatric:  Reports: no symptoms


Endocrine:  Reports: no symptoms


Allergies:  


Coded Allergies:  


     IODINATED CONTRAST MEDIA - IV DYE (Verified  Allergy, Severe, Shortness of 

Breath, 9/16/15)


     DORIPENEM (Verified  Allergy, Intermediate, Itching, 9/16/15)


     KETOROLAC TROMETHAMINE (Verified  Allergy, Intermediate, Hives, 9/16/15)


     TRAMADOL (Verified  Allergy, Intermediate, Hives, 9/16/15)


     ASPIRIN (Verified  Allergy, Mild, 12/7/10)


Subjective


stable for d/c off coumadin





Objective





Last 24 Hour Vital Signs








  Date Time  Temp Pulse Resp B/P Pulse Ox O2 Delivery O2 Flow Rate FiO2


 


5/17/17 16:00 97.5 74 18 141/98 96 Room Air  


 


5/17/17 11:37 97.2 68 14 133/72 98 Room Air  


 


5/17/17 09:10    126/82    


 


5/17/17 09:10  69  126/82    


 


5/17/17 07:56 97.7 69 14 126/82 97 Room Air 97.0 


 


5/17/17 04:45 97.3 64 20 125/67 97 Room Air  


 


5/17/17 00:22 96.3 75 20 129/86 97 Room Air  

















Intake and Output  


 


 5/16/17 5/17/17





 19:00 07:00


 


Intake Total 1465 ml 1200 ml


 


Balance 1465 ml 1200 ml


 


  


 


Intake Oral 360 ml 


 


IV Total 1105 ml 1200 ml


 


# Voids 2 3


 


# Bowel Movements  1








Laboratory Tests


5/17/17 07:10: 


White Blood Count 4.2L, Red Blood Count 4.20L, Hemoglobin 11.5L, Hematocrit 

37.2L, Mean Corpuscular Volume 88, Mean Corpuscular Hemoglobin 27.3, Mean 

Corpuscular Hemoglobin Concent 30.8L, Red Cell Distribution Width 15.3H, 

Platelet Count 149L, Mean Platelet Volume 8.1, Neutrophils (%) (Auto) 33.8L, 

Lymphocytes (%) (Auto) 46.1H, Monocytes (%) (Auto) 12.4H, Eosinophils (%) (Auto

) 5.9H, Basophils (%) (Auto) 1.8, Sodium Level 141, Potassium Level 4.0, 

Chloride Level 103, Carbon Dioxide Level 26, Anion Gap 12, Blood Urea Nitrogen 

14, Creatinine 1.4H, Estimat Glomerular Filtration Rate > 60, Glucose Level 112H

, Calcium Level 9.1


Height (Feet):  6


Height (Inches):  2.00


Weight (Pounds):  226


General Appearance:  alert


EENT:  TMs normal


Neck:  abnormal alignment


Cardiovascular:  regular rhythm


Respiratory/Chest:  normal breath sounds


Pelvis:  speculum exam normal


Genitourinary/Rectal:  normal prostate exam


Extremities:  normal inspection











Jb Parmar May 17, 2017 23:08

## 2017-05-18 NOTE — DISCHARGE SUMMARY
Discharge Summary


Hospital Course


Date of Admission


May 12, 2017 at 18:58


Date of Discharge


May 17, 2017 at 17:45


Admitting Diagnosis


hemoptysis/pancreatitis


HPI


Jonny Graves is a 51 year old male who was admitted on May 12, 2017 at 18:58 

for Hemoptysis/Pacreatitis


Hospital Course


[id number]





Discharge


Discharge Disposition


Patient was discharged to Home (01)


Discharge Diagnoses:  











Roberta Alston NP May 18, 2017 16:05

## 2017-05-19 NOTE — DISCHARGE SUMMARY 2 SIG
DATE OF ADMISSION:  05/12/2017



DATE OF DISCHARGE:  05/17/2017



CONSULTANTS:

1. Jb Parmar M.D.

2. Sudha Light M.D.

3. Eitan Cantu M.D.

4. Josh Cleveland M.D.



BRIEF HOSPITAL COURSE:  The patient is a 51-year-old male who

presented to [facility] with history of nausea, vomiting, and abdominal pain

with hematemesis.  On evaluation at ED,  amylase was minimally elevated

and was given IV antibiotics for possible urinary tract infection.  The

patient was admitted for further evaluation.  Chest x-ray showed no acute

cardiopulmonary disease.  CBC showed mild anemia and mildly elevated

creatinine.  Dr. Cleveland was consulted.  He was initially placed on NPO

and diet was advanced.  He was given proton pump inhibitor.  Abdominal

ultrasound done was unremarkable.  Stool OB  was also negative.

Alpha-fetoprotein was within normal limits.  He was followed by Dr. Parmar as the patient has anemia and history of hypercoagulable state

on anticoagulation with Coumadin.  He has a history of PE and DVT with IVC

filter placement.  The patient has been off Coumadin given hemoptysis and

he has an IVC filter in place.  He was followed by Infectious Diseases

specialist for possible HIV infection and UTI.  He had a positive

screening for HIV with positive antibody.  He was counseled regarding safe

sex practice.  Viral load was checked.  He was required to notify his wife

of his HIV status and advised to follow up with HIV provider as an

outpatient to be started on anti-retroviral therapy.  He was given

ceftriaxone for urine infection.  Renal ultrasound done showed a

nonobstructive stone.  At the time of discharge, antibiotic was

discontinued and was recommended Urology follow up.  He was eventually

discharged home.  Referrals for HIV clinics was given.



FINAL DIAGNOSES:

1. Urinary tract infection.

2. Human immunodeficiency virus.

3. Gastrointestinal bleed, resolved.

4. Hypercoagulable state currently off of Coumadin.

5. History of deep venous thrombosis and pulmonary embolism with

inferior vena cava filter placement.

6. Chronic pain syndrome .

7. Drug-seeking behavior.

8. Iron-deficiency.

9. Gastritis.







  ______________________________________________

  Raimundo Zmabrano D.O.



I have been assigned to dictate discharge summary on this account and I

was not involved in the patient's management.



  ______________________________________________

  Roberta Alston N.P.





DR:  Amirah

D:  05/18/2017 16:05

T:  05/19/2017 03:21

JOB#:  [id number]

CC:

## 2018-12-15 ENCOUNTER — HOSPITAL ENCOUNTER (EMERGENCY)
Dept: HOSPITAL 72 - EMR | Age: 53
Discharge: ANOTHER HOSPITAL | End: 2018-12-15
Payer: MEDICAID

## 2018-12-15 VITALS — SYSTOLIC BLOOD PRESSURE: 132 MMHG | DIASTOLIC BLOOD PRESSURE: 87 MMHG

## 2018-12-15 VITALS — SYSTOLIC BLOOD PRESSURE: 144 MMHG | DIASTOLIC BLOOD PRESSURE: 81 MMHG

## 2018-12-15 VITALS — BODY MASS INDEX: 30.75 KG/M2 | WEIGHT: 232 LBS | HEIGHT: 73 IN

## 2018-12-15 VITALS — DIASTOLIC BLOOD PRESSURE: 87 MMHG | SYSTOLIC BLOOD PRESSURE: 132 MMHG

## 2018-12-15 DIAGNOSIS — R31.9: ICD-10-CM

## 2018-12-15 DIAGNOSIS — Z79.01: ICD-10-CM

## 2018-12-15 DIAGNOSIS — K29.70: Primary | ICD-10-CM

## 2018-12-15 DIAGNOSIS — Z86.718: ICD-10-CM

## 2018-12-15 DIAGNOSIS — R79.1: ICD-10-CM

## 2018-12-15 DIAGNOSIS — G89.29: ICD-10-CM

## 2018-12-15 DIAGNOSIS — R11.10: ICD-10-CM

## 2018-12-15 DIAGNOSIS — Z91.14: ICD-10-CM

## 2018-12-15 LAB
ADD MANUAL DIFF: NO
ALBUMIN SERPL-MCNC: 3.7 G/DL (ref 3.4–5)
ALBUMIN/GLOB SERPL: 0.8 {RATIO} (ref 1–2.7)
ALP SERPL-CCNC: 89 U/L (ref 46–116)
ALT SERPL-CCNC: 18 U/L (ref 12–78)
ANION GAP SERPL CALC-SCNC: 10 MMOL/L (ref 5–15)
APPEARANCE UR: (no result)
APTT BLD: 86 SEC (ref 23–33)
APTT PPP: (no result) S
AST SERPL-CCNC: 20 U/L (ref 15–37)
BASOPHILS NFR BLD AUTO: 1.7 % (ref 0–2)
BILIRUB SERPL-MCNC: 0.2 MG/DL (ref 0.2–1)
BUN SERPL-MCNC: 14 MG/DL (ref 7–18)
CALCIUM SERPL-MCNC: 8.4 MG/DL (ref 8.5–10.1)
CHLORIDE SERPL-SCNC: 105 MMOL/L (ref 98–107)
CK SERPL-CCNC: 175 U/L (ref 26–308)
CO2 SERPL-SCNC: 25 MMOL/L (ref 21–32)
CREAT SERPL-MCNC: 1.6 MG/DL (ref 0.55–1.3)
EOSINOPHIL NFR BLD AUTO: 5.7 % (ref 0–3)
ERYTHROCYTE [DISTWIDTH] IN BLOOD BY AUTOMATED COUNT: 13.5 % (ref 11.6–14.8)
GLOBULIN SER-MCNC: 4.4 G/DL
GLUCOSE UR STRIP-MCNC: NEGATIVE MG/DL
HCT VFR BLD CALC: 35.2 % (ref 42–52)
HGB BLD-MCNC: 11.2 G/DL (ref 14.2–18)
INR PPP: 4.3 (ref 0.9–1.1)
KETONES UR QL STRIP: (no result)
LEUKOCYTE ESTERASE UR QL STRIP: (no result)
LYMPHOCYTES NFR BLD AUTO: 38.5 % (ref 20–45)
MCV RBC AUTO: 82 FL (ref 80–99)
MONOCYTES NFR BLD AUTO: 8.7 % (ref 1–10)
NEUTROPHILS NFR BLD AUTO: 45.4 % (ref 45–75)
NITRITE UR QL STRIP: NEGATIVE
PH UR STRIP: 5 [PH] (ref 4.5–8)
PLATELET # BLD: 159 K/UL (ref 150–450)
POTASSIUM SERPL-SCNC: 3.6 MMOL/L (ref 3.5–5.1)
PROT UR QL STRIP: (no result)
RBC # BLD AUTO: 4.27 M/UL (ref 4.7–6.1)
SODIUM SERPL-SCNC: 140 MMOL/L (ref 136–145)
SP GR UR STRIP: 1.02 (ref 1–1.03)
UROBILINOGEN UR-MCNC: NORMAL MG/DL (ref 0–1)
WBC # BLD AUTO: 4.4 K/UL (ref 4.8–10.8)

## 2018-12-15 PROCEDURE — 85025 COMPLETE CBC W/AUTO DIFF WBC: CPT

## 2018-12-15 PROCEDURE — 80053 COMPREHEN METABOLIC PANEL: CPT

## 2018-12-15 PROCEDURE — 85610 PROTHROMBIN TIME: CPT

## 2018-12-15 PROCEDURE — 81003 URINALYSIS AUTO W/O SCOPE: CPT

## 2018-12-15 PROCEDURE — 96361 HYDRATE IV INFUSION ADD-ON: CPT

## 2018-12-15 PROCEDURE — 96376 TX/PRO/DX INJ SAME DRUG ADON: CPT

## 2018-12-15 PROCEDURE — 84484 ASSAY OF TROPONIN QUANT: CPT

## 2018-12-15 PROCEDURE — 96375 TX/PRO/DX INJ NEW DRUG ADDON: CPT

## 2018-12-15 PROCEDURE — 86901 BLOOD TYPING SEROLOGIC RH(D): CPT

## 2018-12-15 PROCEDURE — 82550 ASSAY OF CK (CPK): CPT

## 2018-12-15 PROCEDURE — 86850 RBC ANTIBODY SCREEN: CPT

## 2018-12-15 PROCEDURE — 83690 ASSAY OF LIPASE: CPT

## 2018-12-15 PROCEDURE — 99285 EMERGENCY DEPT VISIT HI MDM: CPT

## 2018-12-15 PROCEDURE — 93005 ELECTROCARDIOGRAM TRACING: CPT

## 2018-12-15 PROCEDURE — 36415 COLL VENOUS BLD VENIPUNCTURE: CPT

## 2018-12-15 PROCEDURE — 74018 RADEX ABDOMEN 1 VIEW: CPT

## 2018-12-15 PROCEDURE — 85730 THROMBOPLASTIN TIME PARTIAL: CPT

## 2018-12-15 PROCEDURE — 86900 BLOOD TYPING SEROLOGIC ABO: CPT

## 2018-12-15 PROCEDURE — 71045 X-RAY EXAM CHEST 1 VIEW: CPT

## 2018-12-15 PROCEDURE — 96374 THER/PROPH/DIAG INJ IV PUSH: CPT

## 2018-12-15 NOTE — DIAGNOSTIC IMAGING REPORT
EXAM:

  XR Chest, 1 View

 

CLINICAL HISTORY:

  Abdominal pain

 

TECHNIQUE:

  Frontal view of the chest.

 

COMPARISON:

  5/12/2017

 

FINDINGS:

  Lungs:  Unremarkable.  No consolidation.

  Pleural space:  Unremarkable.  No pneumothorax.

  Heart:  Unremarkable.  No cardiomegaly.

  Mediastinum:  Unremarkable.

  Bones/joints:  No acute osseous abnormality.

 

IMPRESSION:     

  No acute cardiopulmonary process.

## 2018-12-15 NOTE — EMERGENCY ROOM REPORT
History of Present Illness


General


Chief Complaint:  Male Urogenital Problems


Source:  Patient





Present Illness


HPI


Patient has been ill for several days.  For one week he's had swelling and 

increased tenderness in his calf on the right-hand side.  He's on Coumadin and 

has an IVC filter.  He's had problems DVT in the past.  The pain is in his calf 

and radiates to his thigh.  Rated 8/10, aching pressure and worse when leg is 

dependent.





Over the last 2 days he's been passing blood in his urine and also vomiting 

some blood and passing dark stools.  He has a history of gastritis in the past.

  He takes omeprazole intermittently.  Denies dysuria.





The patient has felt feverish but not documented.  He has abdominal pain that's 

8/10 in the epigastric area.  It doesn't radiate to his back.





The patient is supposed be taking anti-vitals but has not been compliant 

recently.  





No cough, sore throat, chest pain, hemoptysis.  He has a rash on the R leg (and 

chronic venous stasis changes on L).  





Admitted 2017:


1. Urinary tract infection.


2. Human immunodeficiency virus.


3. Gastrointestinal bleed, resolved.


4. Hypercoagulable state currently off of Coumadin.


5. History of deep venous thrombosis and pulmonary embolism with


inferior vena cava filter placement.


6. Chronic pain syndrome .


7. Drug-seeking behavior.


8. Iron-deficiency.


9. Gastritis.


Allergies:  


Coded Allergies:  


     IODINATED CONTRAST MEDIA - IV DYE (Verified  Allergy, Severe, Shortness of 

Breath, 9/16/15)


     DORIPENEM (Verified  Allergy, Intermediate, Itching, 9/16/15)


     KETOROLAC TROMETHAMINE (Verified  Allergy, Intermediate, Hives, 9/16/15)


     TRAMADOL (Verified  Allergy, Intermediate, Hives, 9/16/15)


     ASPIRIN (Verified  Allergy, Mild, 12/7/10)





Patient History


Past Medical History:  see triage record


Social History:  Denies: smoking, alcohol use, drug use


Social History Narrative


disabled


Reviewed Nursing Documentation:  PMH: Agreed; PSxH: Agreed





Nursing Documentation-PMH


Hx Cardiac Problems:  Yes - CHF, ANEMIC, CHRONIC DVT L leg.


Hx Hypertension:  Yes


Hx Pacemaker:  No - PE


Hx COPD:  No - PE, IVC filter


Hx Diabetes:  Yes - BORDERLINE


Hx Cancer:  No


Hx Gastrointestinal Problems:  Yes


Hx Dialysis:  No - DIVERTICULITIS


Hx Neurological Problems:  No


Hx Concentration Difficulty:  Yes


Hx Dizziness:  Yes


Hx Syncope:  Yes


Hx Weakness:  Yes


Hx Neurologic Surgery:  No





Review of Systems


All Other Systems:  negative except mentioned in HPI





Physical Exam





Vital Signs








  Date Time  Temp Pulse Resp B/P (MAP) Pulse Ox O2 Delivery O2 Flow Rate FiO2


 


12/15/18 20:13 98.1 82 18 155/72 99 Room Air  








Sp02 EP Interpretation:  reviewed, normal


General Appearance:  well appearing, no apparent distress, GCS 15


Head:  normocephalic


Eyes:  bilateral eye normal inspection, bilateral eye PERRL


ENT:  moist mucus membranes


Neck:  supple


Respiratory:  lungs clear, normal breath sounds


Cardiovascular #1:  regular rate, rhythm, edema - R >> L


Cardiovascular #2:  2+ radial (R)


Gastrointestinal:  normal inspection, normal bowel sounds, non tender, no mass, 

non-distended


Musculoskeletal:  back normal, gait/station normal, normal range of motion, 

swelling - R LE


Neurologic:  alert, oriented x3, grossly normal


Psychiatric:  depressed affect


Skin:  normal inspection, warm/dry





Medical Decision Making


Diagnostic Impression:  


 Primary Impression:  


 Gastritis


 Qualified Codes:  K29.01 - Acute gastritis with bleeding


 Additional Impressions:  


 Anticoagulation excessive


 Non compliance w medication regimen


 Persistent vomiting


 Presence of IVC filter


 HIV (human immunodeficiency virus infection)


 Hematuria


 Qualified Codes:  R31.9 - Hematuria, unspecified


ER Course


Patient presents with multiple complaints including vomiting and passing blood 

in stool and right leg swelling and pain.  Differential includes DVT, 

coagulopathy, gastritis, gastroenteritis amongst others.  Evaluation will be 

with EKG, chest x-ray and labs.  Also an ultrasound will be obtained of the 

right leg to exclude DVT.  The patient's been noncompliant with his antivirals 

and therefore occult infection could be occurring.  The patient will be treated 

with Protonix, Benadryl, morphine and Zofran.  Will also receive IV hydration.





EKG normal sinus rhythm rate 77 normal EKG.  Labs with normal WBC, low H/H.  

INR 4.3.  CXR neg.  Abd with IVC filters.  Slightly elevated glucose.





Still with pain.  Morphine repeated.





Discussed with Dr. Ryan Zambrano who accepts at [facility].





As patient hemodynamically stable, not reverse elevated INR.  Will follow H/H.





Patient threaten to sign out AMA but after discussion, agrees to go.





Laboratory Tests








Test


  12/15/18


20:28 12/15/18


20:50


 


Urine Color Pale yellow   


 


Urine Appearance


  Slightly


cloudy 


 


 


Urine pH 5 (4.5-8.0)   


 


Urine Specific Gravity


  1.020


(1.005-1.035) 


 


 


Urine Protein


  2+ (NEGATIVE)


H 


 


 


Urine Glucose (UA)


  Negative


(NEGATIVE) 


 


 


Urine Ketones


  1+ (NEGATIVE)


H 


 


 


Urine Blood


  5+ (NEGATIVE)


H 


 


 


Urine Nitrite


  Negative


(NEGATIVE) 


 


 


Urine Bilirubin


  Negative


(NEGATIVE) 


 


 


Urine Urobilinogen


  Normal MG/DL


(0.0-1.0) 


 


 


Urine Leukocyte Esterase


  2+ (NEGATIVE)


H 


 


 


Urine RBC


  15-20 /HPF (0


- 0)  H 


 


 


Urine WBC


  2-4 /HPF (0 -


0) 


 


 


Urine Squamous Epithelial


Cells None /LPF


(NONE/OCC) 


 


 


Urine Bacteria


  Few /HPF


(NONE) 


 


 


White Blood Count


  


  4.4 K/UL


(4.8-10.8)  L


 


Red Blood Count


  


  4.27 M/UL


(4.70-6.10)  L


 


Hemoglobin


  


  11.2 G/DL


(14.2-18.0)  L


 


Hematocrit


  


  35.2 %


(42.0-52.0)  L


 


Mean Corpuscular Volume  82 FL (80-99)  


 


Mean Corpuscular Hemoglobin


  


  26.2 PG


(27.0-31.0)  L


 


Mean Corpuscular Hemoglobin


Concent 


  31.8 G/DL


(32.0-36.0)  L


 


Red Cell Distribution Width


  


  13.5 %


(11.6-14.8)


 


Platelet Count


  


  159 K/UL


(150-450)


 


Mean Platelet Volume


  


  8.5 FL


(6.5-10.1)


 


Neutrophils (%) (Auto)


  


  45.4 %


(45.0-75.0)


 


Lymphocytes (%) (Auto)


  


  38.5 %


(20.0-45.0)


 


Monocytes (%) (Auto)


  


  8.7 %


(1.0-10.0)


 


Eosinophils (%) (Auto)


  


  5.7 %


(0.0-3.0)  H


 


Basophils (%) (Auto)


  


  1.7 %


(0.0-2.0)


 


Prothrombin Time


  


  42.2 SEC


(9.30-11.50)  H


 


Prothrombin Time INR


  


  4.3 (0.9-1.1)


H


 


PTT


  


  86 SEC (23-33)


H


 


Sodium Level


  


  140 MMOL/L


(136-145)


 


Potassium Level


  


  3.6 MMOL/L


(3.5-5.1)


 


Chloride Level


  


  105 MMOL/L


()


 


Carbon Dioxide Level


  


  25 MMOL/L


(21-32)


 


Anion Gap


  


  10 mmol/L


(5-15)


 


Blood Urea Nitrogen


  


  14 mg/dL


(7-18)


 


Creatinine


  


  1.6 MG/DL


(0.55-1.30)  H


 


Estimate Glomerular


Filtration Rate 


  54.9 mL/min


(>60)


 


Glucose Level


  


  191 MG/DL


()  H


 


Calcium Level


  


  8.4 MG/DL


(8.5-10.1)  L


 


Total Bilirubin


  


  0.2 MG/DL


(0.2-1.0)


 


Aspartate Amino Transferase


(AST) 


  20 U/L (15-37)


 


 


Alanine Aminotransferase (ALT)


  


  18 U/L (12-78)


 


 


Alkaline Phosphatase


  


  89 U/L


()


 


Total Creatine Kinase


  


  175 U/L


()


 


Troponin I


  


  0.000 ng/mL


(0.000-0.056)


 


Total Protein


  


  8.1 G/DL


(6.4-8.2)


 


Albumin


  


  3.7 G/DL


(3.4-5.0)


 


Globulin  4.4 g/dL  


 


Albumin/Globulin Ratio


  


  0.8 (1.0-2.7)


L


 


Lipase


  


  284 U/L


()








EKG Diagnostic Results


Rate:  normal


Rhythm:  NSR


ST Segments:  no acute changes





Rhythm Strip Diag. Results


EP Interpretation:  yes


Rhythm:  NSR, no PVC's, no ectopy





Chest X-Ray Diagnostic Results


Chest X-Ray Diagnostic Results :  


   Chest X-Ray Ordered:  Yes


   Indication:  Other


   EP Interpretation:  Yes


   Interpretation:  no consolidation, no effusion, no pneumothorax


   Impression:  No acute disease


   Electronically Signed by:  Electronically signed by Michele Welsh MD





Other X-Ray Diagnostic Results


Other X-Ray Diagnostic Results :  


   X-Ray ordered:  abd


   # of Views/Limited Vs Complete:  2 View


   Indication:  Pain


   EP Interpretation:  Yes


   Interpretation:  nonspecific bowel gas, no sbo, other - IVC filters


   Impression:  Other


   Electronically Signed by:  Electronically signed by Michele Welsh MD





Last Vital Signs








  Date Time  Temp Pulse Resp B/P (MAP) Pulse Ox O2 Delivery O2 Flow Rate FiO2


 


12/15/18 22:15 98.4 90 16 144/81 99   


 


12/15/18 20:13      Room Air  








Status:  improved


Disposition:  XFER SHT-TRM HOSP


Condition:  Serious


Referrals:  


[facility],REFERRING (PCP)











Michele Welsh MD Dec 15, 2018 21:05

## 2018-12-15 NOTE — DIAGNOSTIC IMAGING REPORT
EXAM:

  XR Abdomen, 2 Views

 

CLINICAL HISTORY:

  ABD PAIN

 

TECHNIQUE:

  Frontal view of the abdomen/pelvis with upright view of the abdomen.

 

COMPARISON:

  CT 1/20/2017

 

FINDINGS:

  Lower thorax:  Bilateral pulmonary opacities are favored to be 

artifactual.

  Intraperitoneal space:  No pneumoperitoneum.

  Gastrointestinal tract:  Paucity of bowel gas.  Stool and gas are seen 

in the nondilated colon.

  Bones/joints:  Unremarkable.

 

IMPRESSION:     

  No acute findings.

## 2020-04-15 ENCOUNTER — HOSPITAL ENCOUNTER (EMERGENCY)
Dept: HOSPITAL 72 - EMR | Age: 55
Discharge: HOME | End: 2020-04-15
Payer: COMMERCIAL

## 2020-04-15 VITALS — BODY MASS INDEX: 31.01 KG/M2 | HEIGHT: 73 IN | WEIGHT: 234 LBS

## 2020-04-15 VITALS — DIASTOLIC BLOOD PRESSURE: 76 MMHG | SYSTOLIC BLOOD PRESSURE: 134 MMHG

## 2020-04-15 VITALS — DIASTOLIC BLOOD PRESSURE: 82 MMHG | SYSTOLIC BLOOD PRESSURE: 130 MMHG

## 2020-04-15 DIAGNOSIS — K29.01: Primary | ICD-10-CM

## 2020-04-15 DIAGNOSIS — I10: ICD-10-CM

## 2020-04-15 DIAGNOSIS — Z88.6: ICD-10-CM

## 2020-04-15 DIAGNOSIS — K62.5: ICD-10-CM

## 2020-04-15 DIAGNOSIS — Z86.711: ICD-10-CM

## 2020-04-15 DIAGNOSIS — Z91.041: ICD-10-CM

## 2020-04-15 DIAGNOSIS — D64.9: ICD-10-CM

## 2020-04-15 DIAGNOSIS — J44.9: ICD-10-CM

## 2020-04-15 DIAGNOSIS — Z86.718: ICD-10-CM

## 2020-04-15 LAB
ADD MANUAL DIFF: NO
ALBUMIN SERPL-MCNC: 3.8 G/DL (ref 3.4–5)
ALBUMIN/GLOB SERPL: 0.9 {RATIO} (ref 1–2.7)
ALP SERPL-CCNC: 98 U/L (ref 46–116)
ALT SERPL-CCNC: 24 U/L (ref 12–78)
ANION GAP SERPL CALC-SCNC: 6 MMOL/L (ref 5–15)
APPEARANCE UR: (no result)
APTT BLD: 36 SEC (ref 23–33)
APTT PPP: (no result) S
AST SERPL-CCNC: 22 U/L (ref 15–37)
BASOPHILS NFR BLD AUTO: 2 % (ref 0–2)
BILIRUB SERPL-MCNC: 0.2 MG/DL (ref 0.2–1)
BUN SERPL-MCNC: 20 MG/DL (ref 7–18)
CALCIUM SERPL-MCNC: 8.8 MG/DL (ref 8.5–10.1)
CHLORIDE SERPL-SCNC: 103 MMOL/L (ref 98–107)
CO2 SERPL-SCNC: 26 MMOL/L (ref 21–32)
CREAT SERPL-MCNC: 2 MG/DL (ref 0.55–1.3)
EOSINOPHIL NFR BLD AUTO: 5.4 % (ref 0–3)
ERYTHROCYTE [DISTWIDTH] IN BLOOD BY AUTOMATED COUNT: 17.5 % (ref 11.6–14.8)
GLOBULIN SER-MCNC: 4.1 G/DL
GLUCOSE UR STRIP-MCNC: NEGATIVE MG/DL
HCT VFR BLD CALC: 36 % (ref 42–52)
HGB BLD-MCNC: 10.8 G/DL (ref 14.2–18)
INR PPP: 1.2 (ref 0.9–1.1)
KETONES UR QL STRIP: (no result)
LEUKOCYTE ESTERASE UR QL STRIP: (no result)
LYMPHOCYTES NFR BLD AUTO: 36.2 % (ref 20–45)
MCV RBC AUTO: 83 FL (ref 80–99)
MONOCYTES NFR BLD AUTO: 11.5 % (ref 1–10)
NEUTROPHILS NFR BLD AUTO: 44.9 % (ref 45–75)
NITRITE UR QL STRIP: NEGATIVE
PH UR STRIP: 5 [PH] (ref 4.5–8)
PLATELET # BLD: 153 K/UL (ref 150–450)
POTASSIUM SERPL-SCNC: 3.4 MMOL/L (ref 3.5–5.1)
PROT UR QL STRIP: (no result)
RBC # BLD AUTO: 4.36 M/UL (ref 4.7–6.1)
SODIUM SERPL-SCNC: 135 MMOL/L (ref 136–145)
SP GR UR STRIP: 1.02 (ref 1–1.03)
UROBILINOGEN UR-MCNC: NORMAL MG/DL (ref 0–1)
WBC # BLD AUTO: 6 K/UL (ref 4.8–10.8)

## 2020-04-15 PROCEDURE — 96375 TX/PRO/DX INJ NEW DRUG ADDON: CPT

## 2020-04-15 PROCEDURE — 96374 THER/PROPH/DIAG INJ IV PUSH: CPT

## 2020-04-15 PROCEDURE — 81003 URINALYSIS AUTO W/O SCOPE: CPT

## 2020-04-15 PROCEDURE — 85610 PROTHROMBIN TIME: CPT

## 2020-04-15 PROCEDURE — 85730 THROMBOPLASTIN TIME PARTIAL: CPT

## 2020-04-15 PROCEDURE — 83690 ASSAY OF LIPASE: CPT

## 2020-04-15 PROCEDURE — 85025 COMPLETE CBC W/AUTO DIFF WBC: CPT

## 2020-04-15 PROCEDURE — 36415 COLL VENOUS BLD VENIPUNCTURE: CPT

## 2020-04-15 PROCEDURE — 99284 EMERGENCY DEPT VISIT MOD MDM: CPT

## 2020-04-15 PROCEDURE — 80053 COMPREHEN METABOLIC PANEL: CPT

## 2020-04-15 NOTE — EMERGENCY ROOM REPORT
History of Present Illness


General


Chief Complaint:  Abdominal Pain


Source:  Patient





Present Illness


HPI


This is a 54-year-old male with history of atrial fibrillation.  He also has a 

history of DVT and PE.  He has an IVC filter and and currently taking Coumadin.

  He presents with chief plaint of epigastric pain and nausea and vomiting with 

specks of blood.  He saw his doctor today.  His INR was elevated and Coumadin 

was held.  He is still having pain.  Pain is 8 out of 10.  Nothing made it 

better.  Nothing made it worse.  He is has chronic right leg pain from his DVT.

  He denies any fever or chills.  Pain has no radiation.  No diarrhea.  He has 

a endoscopy done 9 months ago which showed severe gastritis.  He is currently 

taking Prilosec and Carafate.


Allergies:  


Coded Allergies:  


     IODINATED CONTRAST MEDIA - IV DYE (Verified  Allergy, Severe, Shortness of 

Breath, 9/16/15)


     DORIPENEM (Verified  Allergy, Intermediate, Itching, 9/16/15)


     KETOROLAC TROMETHAMINE (Verified  Allergy, Intermediate, Hives, 9/16/15)


     TRAMADOL (Verified  Allergy, Intermediate, Hives, 9/16/15)


     ASPIRIN (Verified  Allergy, Mild, 12/7/10)





COVID-19 Screening


Contact w/high risk pt:  No


Recent Travel to affected area:  No


Experienced COVID-19 symptoms?:  No





Patient History


Past Medical History:  see triage record, old chart reviewed, HTN, AFib


Past Surgical History:  other


Pertinent Family History:  none


Social History:  Denies: smoking


Immunizations:  other


Reviewed Nursing Documentation:  PMH: Agreed; PSxH: Agreed





Nursing Documentation-PMH


Hx Cardiac Problems:  Yes - AFIB; CHF


Hx Hypertension:  Yes


Hx Pacemaker:  No - PE


Hx COPD:  Yes


Hx Diabetes:  Yes - BORDERLINE


Hx Cancer:  No


Hx Gastrointestinal Problems:  Yes - Gastritis; hernia


Hx Dialysis:  No - DIVERTICULITIS


Hx Neurological Problems:  No


Hx Cerebrovascular Accident:  Yes - PE; DVT


Hx Concentration Difficulty:  Yes


Hx Dizziness:  Yes


Hx Syncope:  Yes


Hx Weakness:  Yes


Hx Neurologic Surgery:  No





Review of Systems


Eye:  Denies: eye pain, blurred vision


ENT:  Denies: ear pain, nose congestion, throat swelling


Respiratory:  Denies: cough, shortness of breath


Cardiovascular:  Denies: chest pain, palpitations


Gastrointestinal:  Reports: abdominal pain; Denies: diarrhea, nausea, vomiting


Musculoskeletal:  Denies: back pain, joint pain


Skin:  Denies: rash


Neurological:  Denies: headache, numbness


Endocrine:  Denies: increased thirst, increased urine


Hematologic/Lymphatic:  Denies: easy bruising


All Other Systems:  negative except mentioned in HPI





Physical Exam





Vital Signs








  Date Time  Temp Pulse Resp B/P (MAP) Pulse Ox O2 Delivery O2 Flow Rate FiO2


 


4/15/20 21:46 98.1 93 16 132/75 (94) 96 Room Air  





Vitals normal


Sp02 EP Interpretation:  reviewed, normal


General Appearance:  well appearing, no apparent distress, alert


Head:  normocephalic, atraumatic


Eyes:  bilateral eye PERRL, bilateral eye EOMI


ENT:  hearing grossly normal, normal pharynx


Neck:  full range of motion, supple, no meningismus


Respiratory:  chest non-tender, lungs clear, normal breath sounds


Cardiovascular #1:  regular rate, rhythm, no murmur


Gastrointestinal:  normal bowel sounds, non tender, no mass, no organomegaly, 

no bruit, non-distended


Musculoskeletal:  back normal, normal range of motion, gait/station normal


Psychiatric:  mood/affect normal





Medical Decision Making


Diagnostic Impression:  


 Primary Impression:  


 Acute GI bleeding


 Additional Impressions:  


 Anemia


 Qualified Codes:  D64.9 - Anemia, unspecified


 Gastritis


 Qualified Codes:  K29.01 - Acute gastritis with bleeding


ER Course


Patient presents with gastritis with some minor bleeding.  No active bleeding 

here.  No acute abdomen.  Slightly anemic but not much change from baseline.  

Will discharge home.


Rhythm Strip Diag. Results


EP Interpretation:  yes


Rate:  81


Rhythm:  NSR, no PVC's, no ectopy





Last Vital Signs








  Date Time  Temp Pulse Resp B/P (MAP) Pulse Ox O2 Delivery O2 Flow Rate FiO2


 


4/15/20 21:46 98.1 93 16 132/75 (94) 96 Room Air  








Status:  improved


Disposition:  HOME, SELF-CARE


Condition:  Stable


Scripts


Hydrocodone/Acetaminophen 5-325* (HYDROCODONE/ACETAMINOPHEN 5-325*) 1 Each 

Tablet


1 TAB ORAL Q6H PRN for For Pain, #15 TAB 0 Refills


   Prov: Roney Locke MD         4/15/20





Additional Instructions:  


Increase your Prilosec to twice a day for a week.  Discuss with your doctor 

regarding restarting Coumadin.  Follow-up with your doctor in a week.  Return 

if symptoms worsen.











Roney Locke MD Apr 15, 2020 22:06

## 2020-04-15 NOTE — NUR
ED Nurse Note:



Patient has a port-a-cath on right subclavian, placed a 20g port-a-cath 
infusion set, patent and asymptomatic.

## 2020-04-15 NOTE — NUR
ED Nurse Note:



Recieved report from CHULA Draper. Patient walked into ED from home d/t c/o right 
leg swelling and throbbing pain d/t suspected DVT and abdominal pain 7/10. 
Patient also reports hematuria and coughing up blood. Patient aao x 4 and 
ambulatory with steady gait. Patient placed in gown and cardiac monitor. No 
acute distress noted.

## 2020-04-15 NOTE — NUR
ER DISCHARGE NOTE:



Patient is cleared to be discharged per ERMD, pt is aox4, on room air, with 
stable vital signs. pt was given dc and prescription instructions, pt was able 
to verbalize understanding, pt id band and port-a-cath infusion tubing removed 
intact without complications. pt is able to ambulate with steady gait. pt took 
all belongings. pt stable upon discharge.

## 2020-06-24 ENCOUNTER — HOSPITAL ENCOUNTER (EMERGENCY)
Dept: HOSPITAL 72 - EMR | Age: 55
LOS: 1 days | Discharge: ANOTHER HOSPITAL | End: 2020-06-25
Payer: MEDICAID

## 2020-06-24 VITALS — WEIGHT: 234 LBS | HEIGHT: 73 IN | BODY MASS INDEX: 31.01 KG/M2

## 2020-06-24 VITALS — SYSTOLIC BLOOD PRESSURE: 127 MMHG | DIASTOLIC BLOOD PRESSURE: 82 MMHG

## 2020-06-24 DIAGNOSIS — I11.0: ICD-10-CM

## 2020-06-24 DIAGNOSIS — Z91.041: ICD-10-CM

## 2020-06-24 DIAGNOSIS — I82.401: ICD-10-CM

## 2020-06-24 DIAGNOSIS — Z86.718: ICD-10-CM

## 2020-06-24 DIAGNOSIS — R79.1: ICD-10-CM

## 2020-06-24 DIAGNOSIS — Z88.5: ICD-10-CM

## 2020-06-24 DIAGNOSIS — K92.0: Primary | ICD-10-CM

## 2020-06-24 DIAGNOSIS — Z79.01: ICD-10-CM

## 2020-06-24 DIAGNOSIS — I48.91: ICD-10-CM

## 2020-06-24 DIAGNOSIS — Z88.6: ICD-10-CM

## 2020-06-24 DIAGNOSIS — I50.9: ICD-10-CM

## 2020-06-24 DIAGNOSIS — Z88.8: ICD-10-CM

## 2020-06-24 DIAGNOSIS — J44.9: ICD-10-CM

## 2020-06-24 DIAGNOSIS — Z86.73: ICD-10-CM

## 2020-06-24 LAB
ADD MANUAL DIFF: NO
APPEARANCE UR: (no result)
BASOPHILS NFR BLD AUTO: 1.2 % (ref 0–2)
EOSINOPHIL NFR BLD AUTO: 4.1 % (ref 0–3)
ERYTHROCYTE [DISTWIDTH] IN BLOOD BY AUTOMATED COUNT: 17.5 % (ref 11.6–14.8)
GLUCOSE UR STRIP-MCNC: NEGATIVE MG/DL
HCT VFR BLD CALC: 34.8 % (ref 42–52)
HGB BLD-MCNC: 10.3 G/DL (ref 14.2–18)
KETONES UR QL STRIP: (no result)
LEUKOCYTE ESTERASE UR QL STRIP: (no result)
LYMPHOCYTES NFR BLD AUTO: 36.6 % (ref 20–45)
MCV RBC AUTO: 83 FL (ref 80–99)
MONOCYTES NFR BLD AUTO: 9.9 % (ref 1–10)
NEUTROPHILS NFR BLD AUTO: 48.2 % (ref 45–75)
NITRITE UR QL STRIP: POSITIVE
PH UR STRIP: 5 [PH] (ref 4.5–8)
PLATELET # BLD: 174 K/UL (ref 150–450)
PROT UR QL STRIP: (no result)
RBC # BLD AUTO: 4.22 M/UL (ref 4.7–6.1)
SP GR UR STRIP: 1.02 (ref 1–1.03)
UROBILINOGEN UR-MCNC: 1 MG/DL (ref 0–1)
WBC # BLD AUTO: 6.4 K/UL (ref 4.8–10.8)

## 2020-06-24 PROCEDURE — 82550 ASSAY OF CK (CPK): CPT

## 2020-06-24 PROCEDURE — 36415 COLL VENOUS BLD VENIPUNCTURE: CPT

## 2020-06-24 PROCEDURE — 99284 EMERGENCY DEPT VISIT MOD MDM: CPT

## 2020-06-24 PROCEDURE — 80053 COMPREHEN METABOLIC PANEL: CPT

## 2020-06-24 PROCEDURE — 74018 RADEX ABDOMEN 1 VIEW: CPT

## 2020-06-24 PROCEDURE — 71045 X-RAY EXAM CHEST 1 VIEW: CPT

## 2020-06-24 PROCEDURE — 93970 EXTREMITY STUDY: CPT

## 2020-06-24 PROCEDURE — 84484 ASSAY OF TROPONIN QUANT: CPT

## 2020-06-24 PROCEDURE — 83690 ASSAY OF LIPASE: CPT

## 2020-06-24 PROCEDURE — 96375 TX/PRO/DX INJ NEW DRUG ADDON: CPT

## 2020-06-24 PROCEDURE — 93005 ELECTROCARDIOGRAM TRACING: CPT

## 2020-06-24 PROCEDURE — 85610 PROTHROMBIN TIME: CPT

## 2020-06-24 PROCEDURE — 96374 THER/PROPH/DIAG INJ IV PUSH: CPT

## 2020-06-24 PROCEDURE — 96376 TX/PRO/DX INJ SAME DRUG ADON: CPT

## 2020-06-24 PROCEDURE — 85025 COMPLETE CBC W/AUTO DIFF WBC: CPT

## 2020-06-24 PROCEDURE — 87086 URINE CULTURE/COLONY COUNT: CPT

## 2020-06-24 PROCEDURE — 81003 URINALYSIS AUTO W/O SCOPE: CPT

## 2020-06-25 VITALS — SYSTOLIC BLOOD PRESSURE: 123 MMHG | DIASTOLIC BLOOD PRESSURE: 76 MMHG

## 2020-06-25 VITALS — SYSTOLIC BLOOD PRESSURE: 132 MMHG | DIASTOLIC BLOOD PRESSURE: 77 MMHG

## 2020-06-25 LAB
ALBUMIN SERPL-MCNC: 3.8 G/DL (ref 3.4–5)
ALBUMIN/GLOB SERPL: 0.9 {RATIO} (ref 1–2.7)
ALP SERPL-CCNC: 93 U/L (ref 46–116)
ALT SERPL-CCNC: 18 U/L (ref 12–78)
ANION GAP SERPL CALC-SCNC: 11 MMOL/L (ref 5–15)
APTT PPP: (no result) S
AST SERPL-CCNC: 25 U/L (ref 15–37)
BILIRUB SERPL-MCNC: 0.3 MG/DL (ref 0.2–1)
BUN SERPL-MCNC: 16 MG/DL (ref 7–18)
CALCIUM SERPL-MCNC: 8.5 MG/DL (ref 8.5–10.1)
CHLORIDE SERPL-SCNC: 104 MMOL/L (ref 98–107)
CK SERPL-CCNC: 140 U/L (ref 26–308)
CO2 SERPL-SCNC: 25 MMOL/L (ref 21–32)
CREAT SERPL-MCNC: 1.8 MG/DL (ref 0.55–1.3)
GLOBULIN SER-MCNC: 4.2 G/DL
INR PPP: 1.1 (ref 0.9–1.1)
POTASSIUM SERPL-SCNC: 3.5 MMOL/L (ref 3.5–5.1)
SODIUM SERPL-SCNC: 140 MMOL/L (ref 136–145)

## 2020-06-25 NOTE — DIAGNOSTIC IMAGING REPORT
Procedure: XRAY Chest 1v

Reason for study: Chest pain

 

Comparison films:  12/15/2018.

 

FINDINGS:

 

There is a right subclavian central venous catheter in place. Vascularity is normal.

The lung fields are clear bilaterally. Cardiac and mediastinal silhouette are within

normal limits. CP angles are sharp.  The bony thorax appear unremarkable.

 

IMPRESSION:  

 

NO ACUTE CARDIOPULMONARY DISEASE.

## 2020-06-25 NOTE — EMERGENCY ROOM REPORT
History of Present Illness


General


Chief Complaint:  Nausea, Vomiting, and Diarrhea


Source:  Patient





Present Illness


HPI


Patient presents with 3 days of abdominal pain.  He points to his lower 

abdomen.  He is also been vomiting.  He is on Coumadin and states he was in 

coffee grounds and also blood.  He is not passing any melena but he has 

diarrhea that is been a clear color.  The patient has had this problem before.  

He is taking Coumadin for a DVT in his leg.  He says he is not able to keep 

down his Coumadin at this time and has had increased pain in his leg over the 

course of the last 3 days.  He is also complaining about increased swelling and 

edema along with pain in his calf also.  He is complaining about 8/10 pain in 

his leg.  There is aching and throbbing.  It radiates up towards his body.





His doctor told him to come to the emergency department here.  Patient has a 

Port-A-Cath.





The patient was last seen here April 15 with and diagnosed with gastritis.  He 

was able to be discharged home.





No fevers, chills, sore throat, chest pain, palpitations, dysuria, abdominal 

pain, shortness of breath, rashes, anxiety, visual changes, dizziness, headache.


Allergies:  


Coded Allergies:  


     IODINATED CONTRAST MEDIA - IV DYE (Verified  Allergy, Severe, Shortness of 

Breath, 9/16/15)


     DORIPENEM (Verified  Allergy, Intermediate, Itching, 9/16/15)


     KETOROLAC TROMETHAMINE (Verified  Allergy, Intermediate, Hives, 9/16/15)


     TRAMADOL (Verified  Allergy, Intermediate, Hives, 9/16/15)


     ASPIRIN (Verified  Allergy, Mild, 12/7/10)





COVID-19 Screening


Contact w/high risk pt:  No


Recent Travel to affected area:  No


Experienced COVID-19 symptoms?:  No


COVID-19 Testing performed PTA:  No





Patient History


Past Medical History:  see triage record


Social History:  Denies: smoking


Social History Narrative


from home


Reviewed Nursing Documentation:  PMH: Agreed; PSxH: Agreed





Nursing Documentation-PM


Past Medical History:  No History, Except For


Hx Cardiac Problems:  Yes - AFIB; CHF


Hx Hypertension:  Yes


Hx Pacemaker:  No - PE


Hx COPD:  Yes


Hx Diabetes:  Yes - BORDERLINE


Hx Cancer:  No


Hx Gastrointestinal Problems:  Yes - Gastritis; hernia


Hx Dialysis:  No - DIVERTICULITIS


Hx Neurological Problems:  No


Hx Cerebrovascular Accident:  Yes - PE; DVT


Hx Concentration Difficulty:  Yes


Hx Dizziness:  Yes


Hx Syncope:  Yes


Hx Weakness:  Yes


Hx Neurologic Surgery:  No





Review of Systems


All Other Systems:  negative except mentioned in HPI





Physical Exam





Vital Signs








  Date Time  Temp Pulse Resp B/P (MAP) Pulse Ox O2 Delivery O2 Flow Rate FiO2


 


6/24/20 23:12 98.1 76 18 153/84 (107) 95 Room Air  








Sp02 EP Interpretation:  reviewed, normal


General Appearance:  no apparent distress, alert, GCS 15, non-toxic, 

Chronically Ill


Head:  normocephalic


Eyes:  bilateral eye normal inspection, bilateral eye PERRL, bilateral eye EOMI


ENT:  moist mucus membranes


Neck:  supple


Respiratory:  lungs clear, normal breath sounds


Cardiovascular #1:  regular rate, rhythm, edema - Bilateral lower extremities


Cardiovascular #2:  2+ radial (R), 2+ dorsalis pedis (R), 2+ dorsalis pedis (L)


Gastrointestinal:  normal inspection, normal bowel sounds, non tender, no mass, 

non-distended


Musculoskeletal:  back normal, normal range of motion, gait/station normal


Neurologic:  alert, oriented x3, grossly normal


Psychiatric:  depressed affect


Skin:  other - Venous disease lower extremities





Medical Decision Making


Diagnostic Impression:  


 Primary Impression:  


 Hematemesis


 Qualified Codes:  K92.0 - Hematemesis


 Additional Impressions:  


 Right leg DVT


 Qualified Codes:  I82.401 - Acute embolism and thrombosis of unspecified deep 

veins of right lower extremity


 Inadequate anticoagulation


ER Course


Patient with history of gastritis presents with abdominal pain that is lower 

and vomiting of blood and coffee grounds while taking Coumadin.  Differential 

includes upper GI bleed, gastritis, excessive anticoagulation, pancreatitis 

amongst others.  Additionally is complaining about increased pain in his right 

leg with swelling.  Differential here is DVT, increasing chronic pain amongst 

others.  Hydration with EKG, chest x-ray noninvasive vascular study of his legs 

and labs.  Patient treated with IV hydration, Protonix, Reglan, Benadryl and 

Dilaudid.





EKG with sinus bradycardia rate 58 nonspecific ST-T wave changes.  Chest x-ray 

no infiltrates and Port-A-Cath is present.  INR is 1.1 which is subtherapeutic.

  White count is normal.  Patient has anemia but the hemoglobin is 10.3 which 

is similar to April the lower.





Ultrasound does not identify definitive DVT however clinically the patient 

appears to have one.





No more vomiting but patient continues with abdominal pain that is in his lower 

abdomen.  Abdomen is soft and this is a nonsurgical belly at this time.  

Consideration of CT scan in the future.





Is extremely complicated patient with comorbidities.  The fact that he is GI 

bleeding and supposed be on Coumadin but anti-coagulation is inadequate is 

probably advantageous at the moment.  Patient needs to be admitted to the 

hospital for further evaluation and consideration for restarting 

anticoagulation.  As there is no evidence of threatening DVT further 

anticoagulation will be held in light of the GI bleeding.





Discussed with Dr. Shearer who accepts patient





Laboratory Tests








Test


  6/24/20


23:35


 


White Blood Count


  6.4 K/UL


(4.8-10.8)


 


Red Blood Count


  4.22 M/UL


(4.70-6.10)  L


 


Hemoglobin


  10.3 G/DL


(14.2-18.0)  L


 


Hematocrit


  34.8 %


(42.0-52.0)  L


 


Mean Corpuscular Volume 83 FL (80-99)  


 


Mean Corpuscular Hemoglobin


  24.4 PG


(27.0-31.0)  L


 


Mean Corpuscular Hemoglobin


Concent 29.6 G/DL


(32.0-36.0)  L


 


Red Cell Distribution Width


  17.5 %


(11.6-14.8)  H


 


Platelet Count


  174 K/UL


(150-450)


 


Mean Platelet Volume


  7.0 FL


(6.5-10.1)


 


Neutrophils (%) (Auto)


  48.2 %


(45.0-75.0)


 


Lymphocytes (%) (Auto)


  36.6 %


(20.0-45.0)


 


Monocytes (%) (Auto)


  9.9 %


(1.0-10.0)


 


Eosinophils (%) (Auto)


  4.1 %


(0.0-3.0)  H


 


Basophils (%) (Auto)


  1.2 %


(0.0-2.0)


 


Prothrombin Time


  11.7 SEC


(9.30-11.50)  H


 


Prothrombin Time INR 1.1 (0.9-1.1)  


 


Urine Color Arlene  


 


Urine Appearance Cloudy  


 


Urine pH 5 (4.5-8.0)  


 


Urine Specific Gravity


  1.025


(1.005-1.035)


 


Urine Protein


  2+ (NEGATIVE)


H


 


Urine Glucose (UA)


  Negative


(NEGATIVE)


 


Urine Ketones


  1+ (NEGATIVE)


H


 


Urine Blood


  5+ (NEGATIVE)


H


 


Urine Nitrite


  Positive


(NEGATIVE)  H


 


Urine Bilirubin


  1+ (NEGATIVE)


H


 


Urine Ictotest


  Positive


(NEGATIVE)


 


Urine Urobilinogen


  1 MG/DL


(0.0-1.0)  H


 


Urine Leukocyte Esterase


  2+ (NEGATIVE)


H


 


Urine RBC


  Tntc /HPF (0 -


0)  H


 


Urine WBC


  10-15 /HPF (0


- 0)  H


 


Urine Squamous Epithelial


Cells None /LPF


(NONE/OCC)


 


Urine Bacteria


  Many /HPF


(NONE)  H


 


Sodium Level


  140 MMOL/L


(136-145)


 


Potassium Level


  3.5 MMOL/L


(3.5-5.1)


 


Chloride Level


  104 MMOL/L


()


 


Carbon Dioxide Level


  25 MMOL/L


(21-32)


 


Anion Gap


  11 mmol/L


(5-15)


 


Blood Urea Nitrogen


  16 mg/dL


(7-18)


 


Creatinine


  1.8 MG/DL


(0.55-1.30)  H


 


Estimated Glomerular


Filtration Rate 47.5 mL/min


(>60)


 


Glucose Level


  103 MG/DL


()


 


Calcium Level


  8.5 MG/DL


(8.5-10.1)


 


Total Bilirubin


  0.3 MG/DL


(0.2-1.0)


 


Aspartate Amino Transferase


(AST) 25 U/L (15-37)


 


 


Alanine Aminotransferase (ALT)


  18 U/L (12-78)


 


 


Alkaline Phosphatase


  93 U/L


()


 


Total Creatine Kinase


  140 U/L


()


 


Troponin I


  0.000 ng/mL


(0.000-0.056)


 


Total Protein


  8.0 G/DL


(6.4-8.2)


 


Albumin


  3.8 G/DL


(3.4-5.0)


 


Globulin 4.2 g/dL  


 


Albumin/Globulin Ratio


  0.9 (1.0-2.7)


L


 


Lipase


  315 U/L


()








EKG Diagnostic Results


Rate:  bradycardiac


Rhythm:  NSR


ST Segments:  no acute changes





Rhythm Strip Diag. Results


EP Interpretation:  yes


Rhythm:  NSR, no PVC's, no ectopy





Chest X-Ray Diagnostic Results


Chest X-Ray Diagnostic Results :  


   Chest X-Ray Ordered:  Yes


   # of Views/Limited/Complete:  1 View


   Indication:  Other


   EP Interpretation:  Yes


   Interpretation:  no consolidation, no effusion, no pneumothorax, other - 

[place]-Cath right


   Impression:  No acute disease


   Electronically Signed by:  Electronically signed by Michele Welsh MD





CT/MRI/US Diagnostic Results


CT/MRI/US Diagnostic Results :  


   Imaging Test Ordered:  venous doppler los LE


   Impression


No proximal DVT





Last Vital Signs








  Date Time  Temp Pulse Resp B/P (MAP) Pulse Ox O2 Delivery O2 Flow Rate FiO2


 


6/25/20 03:14 98.0 65 16 123/76 99 Room Air  








Status:  improved


Disposition:  SHORT-TERM HOSP


Condition:  Serious


Referrals:  


NON PHYSICIAN (PCP)











Michele Welsh MD Jun 25, 2020 02:29

## 2020-06-25 NOTE — DIAGNOSTIC IMAGING REPORT
EXAM: ULTRASOUND Venous Duplex Scan Feliberto Leg

 

CLINICAL HISTORY: Leg pain and edema.

 

COMPARISON:  None

 

TECHNIQUE:  Doppler examination include grayscale images obtained with and without

compression, and color and spectral doppler analysis.

 

FINDINGS:  

 

Doppler examination shows normal spontaneity, phasicity, compressibility in the deep

venous segments of both lower extremities. No acute DVT noted. In the right common

femoral vein, there is linear echogenic stranding suggestive of old recanalized

fibrin clot. There is good color opacification noted around these linear strands.

 

IMPRESSION:

 

NO EVIDENCE OF ACUTE DVT.

 

LINEAR ECHOGENIC STRANDS NOTED IN THE RIGHT COMMON FEMORAL VEIN WHICH COULD REPRESENT

OLD RECANALIZED CLOT.

## 2020-06-25 NOTE — DIAGNOSTIC IMAGING REPORT
EXAM: XRAY Abdomen 1v

 

HISTORY: Reason For Exam: ABD PAIN

 

COMPARISON: 12/15/2018.

 

TECHNIQUE:  Frontal view of the abdomen obtained.

 

FINDINGS:

 

There is relative paucity of bowel gas. Mildly dilated small bowel loop noted in the

left mid abdomen. There are stool lucencies in the colon.  An IVC filter is again

noted. No definite pathologic calcifications identified.  There is no sign of free

air. Right side of abdomen is excluded on radiograph.

 

IMPRESSION:

 

SLIGHTLY LIMITED STUDY WITH LATERAL RIGHT SIDE OF THE ABDOMEN EXCLUDED.

 

NONSPECIFIC BOWEL GAS PATTERN. RELATIVE PAUCITY OF BOWEL GAS.

 

IVC FILTER IN PLACE.

## 2020-09-01 NOTE — CONSULTATION
DATE OF CONSULTATION:



INFECTIOUS DISEASE CONSULTATION



REQUESTING PHYSICIAN:  Raimundo Zambrano D.O.



REASON FOR CONSULTATION:  Urinary tract infection, nausea and

vomiting, rule out acute pyelonephritis.



HISTORY OF PRESENT ILLNESS:  The patient is a 51-year-old male with

no significant past medical history presented to [facility]

with two days of hematemesis, abdominal pain, and hematuria.  The

patient's pain is generalized mainly on the side of his abdomen and in the

flank area and described it as a dull ache pain, 7/10.  It gets worse with

urination and movements, get better with rest.  Abdominal pain was

associated with vomiting.  No diarrhea or hematochezia today but he had

hematuria.  The patient had history of prostate problem but he never

sought any medical attention for it so far.  So he was admitted to [facility] for further evaluation and management.  He had a urinalysis

which showed evidence of  infection, so I was asked by the primary

provider for antibiotics recommendation.  The patient denied any fever or

chills.  No headache or blurry vision.  No sore throat or runny nose.  No

chest pain or palpitations.  No cough or shortness of breath.



REVIEW OF SYSTEMS:  A 14-point of system reviewed were all negative

apart from the one I mentioned above in my History and Physical.



PAST MEDICAL HISTORY:  He had possible abdominal malignancy which he

had surgery for but he did not have any malignancy .



PAST SURGICAL HISTORY:  Laparotomy.



ALLERGIES:  He had extensive drug allergy to iodinated contrast,

doripenem, ketorolac, tramadol, and aspirin.



MEDICATIONS:  He is on morphine, Vasotec, Benadryl, Coumadin,

Tylenol, MiraLAX, Zofran,  Mylanta, and dextrose.



FAMILY HISTORY:  Noncontributory.



SOCIAL HISTORY:  The patient lives at home with family.  Denied using

any drugs, tobacco, or alcohol.



PHYSICAL EXAMINATION:

VITAL SIGNS:  Temperature 97, pulse 70, respirations 20, blood

pressure 117/67, pulse oximetry 96% on room air.

GENERAL:  This is a middle-aged male, in bed, awake, alert, oriented,

not in distress, complains of mild abdominal pain.

HEENT:  normocephalic and atraumatic.  Pupils both reactive to light

equally.  Moist oral mucosa.  No exudate or thrush.

NECK:  Supple.  No lymphadenopathy.

CARDIOVASCULAR:  Regular rate and rhythm.  No murmur or gallop.

LUNGS:  Clear bilaterally.  No wheezing.  No rhonchi.  Normal

breathing efforts.

ABDOMEN:  Soft, obese, tender mainly on the side and then flank area.

No rebound.  No hepatomegaly.  No ascites.

EXTREMITIES:  No edema or cyanosis.



LABORATORY AND DIAGNOSTIC DATA:  Showed white count of 5, hemoglobin

of 11.9, hematocrit of 38, platelet count of 172,000.  BUN 17, creatinine

of 1.5.  AST of 27, ALT of 20, lipase of 53.  Urinalysis showed +1

leukocyte esterase, red blood cells too numerous to count, WBC too

numerous to count, and moderate amount of bacteria.  Imaging, he had

abdominal CT scan and pelvis without contrast which showed obstructive

left renal calculus, no hydronephrosis, IVC filter, diverticulosis, and small 
hiatal hernia.



ASSESSMENT AND PLAN:

1. Urinary tract infection, possible pyelonephritis.  The patient will

be started on intravenous ceftriaxone empirically and send urine for

culture.  CT scan of abdomen and pelvis did not show any obstructive

etiology or hydronephrosis.  Recommend follow up with the urologist for

enlarge prostate evaluation and management.

2. Hypokalemia and hypocalcemia suspect dehydration related versus

gastrointestinal loss, replace as needed.  Continue hydration.  Monitor

electrolyte levels closely.

3. Upper gastrointestinal bleeding.  Consult gastrointestinal.  Keep

NPO.  Monitor hemoglobin and hematocrit. Transfuse blood as needed.

4. Intractable abdominal pain.  Continue pain management as per primary.

CT abdomen did not show any acute pathology to explain his abdominal

pain.  May need esophagogastroduodenoscopy for further evaluation and

management.











  ______________________________________________

  Eitan Cantu M.D.





DR:  Baltazar

D:  01/21/2017 14:11

T:  01/21/2017 23:51

JOB#:  [id number]

CC:



ARLIN
DATE OF CONSULTATION:  01/21/2017



CHIEF COMPLAINT:  Coffee-grounds emesis.



HISTORY OF PRESENT ILLNESS:  This is a 51-year-old male, known to me

prior admissions both here and [facility].  He keeps admitting

to the hospital most probably for seeking for pain medication.  He every

time complains of coffee-grounds emesis, but interestingly his stool OB is

always negative and his H and H has been stable.  For example, he was

admitted here in September, his hemoglobin at the time of discharge was

12, at this time admitted with hemoglobin of 11.9, so the patient keeps

coming for the same symptoms unfortunately.



PAST MEDICAL HISTORY:

1. History of nephrolithiasis.

2. Diverticulosis.

3. Hiatal hernia.

4. History of hypercoagulable state, status post IVC filter placement

for DVT.

5. History of bowel obstruction.

6. Questionable history of pancreatitis.



ALLERGIES:  To aspirin, doripenem, and iodine.



MEDICATIONS:  Please see medication reconciliation list.



SOCIAL HISTORY:  The patient denies any tobacco, alcohol, or drug

abuse.



FAMILY HISTORY:  Noncontributory.



REVIEW OF SYSTEMS:  A 10-point review of systems was performed and

pertinent positives in history of present illness.



PHYSICAL EXAMINATION:

GENERAL:  The patient is a well developed male, in no acute

distress.

VITAL SIGNS:  Temperature is 97 degrees, pulse 70, respirations 20,

and blood pressure 117/67.

HEENT:  Normocephalic and atraumatic.

NECK:  Supple.   No evidence of lymphadenopathy.

CARDIOVASCULAR:  Regular rhythm.  Plus S1 and S2.

LUNGS:  __________

ABDOMEN:  Soft and nontender.  No rebound.  No guarding.

EXTREMITIES:  No cyanosis.  No clubbing.  No edema.



LABORATORY DATA:  White count is 5, hemoglobin 11.9, hematocrit 38,

and platelet count 172,000.  BUN is 17, creatinine 1.5, and glucose 111.

Iron saturation 16%.  Alkaline phosphate is 132.  Albumin is 4.1.  CEA is

4.8.  Amylase was elevated to 221.  Lipase was normal at 63.



ASSESSMENT AND PLAN:  This is a 51-year-old male with recurrent

admissions for coffee-grounds emesis without any obvious gastrointestinal

bleeding.  Stool OB is pending.  The patient's hemoglobin and hematocrit

has been stable since last admission in September.



PLAN:

1. Resume his diet.

2. Repeat amylase and lipase for tomorrow given the patient has abnormal

amylase.  Follow laboratories.

3. Hold off any GI procedures at this time.









  ______________________________________________

  Josh Cleveland M.D.





DR:  LLOYD

D:  01/21/2017 09:41

T:  01/21/2017 22:59

JOB#:  [id number]

CC:
History of Present Illness


General


Chief Complaint:  Nausea, Vomiting, and Diarrhea


Referring physician:  Dr Zambrano


Reason for Consultation:  Hypokalemia, hypernatremia





Present Illness


HPI


The patient is a 51-year-old male with history of chronic generalized pain, HIV

, recurrent hematuria, DVT on coumadin, gastroparesis,who presented to [facility] ED with two days of hematemesis, abdominal pain, and hematuria. 

Pain is in right quadrant of his abdomen and in the flank area and he described 

it as a dull pain, 9/10. Pain is worse with urination, vomiting and movement.


Denies diarrhea or hematochezia, denies fever, no chills, no headache or blurry 

vision, no chest pain or SOB, c/o hematuria.


Urinalysis showed evidence of  infection, BMP showed potassium at 2.7, sodium 

is 148, calcium is 5.6


Allergies:  


Coded Allergies:  


     IODINATED CONTRAST MEDIA - IV DYE (Verified  Allergy, Severe, Shortness of 

Breath, 9/16/15)


     DORIPENEM (Verified  Allergy, Intermediate, Itching, 9/16/15)


     KETOROLAC TROMETHAMINE (Verified  Allergy, Intermediate, Hives, 9/16/15)


     TRAMADOL (Verified  Allergy, Intermediate, Hives, 9/16/15)


     ASPIRIN (Verified  Allergy, Mild, 12/7/10)





Medication History


Scheduled


Aspirin* (Aspir 81*), 81 MG ORAL DAILY, (Reported)


Carvedilol (Coreg), 6.25 MG ORAL DAILY, (Reported)


Enalapril Maleate* (Vasotec*), 10 MG ORAL DAILY, (Reported)


Esomeprazole Magnesium (Nexium), 40 MG ORAL DAILY, (Reported)


Ferrous Sulfate* (Ferrous Sulfate*), 325 MG ORAL DAILY, (Reported)


Gabapentin (Neurontin), 300 MG ORAL THREE TIMES A DAY, (Reported)


Isosorbide Mononitrate* (Imdur*), 60 MG ORAL DAILY, (Reported)


Lactobacillus Rhamnosus Gg* (Culturelle*), 1 CAP ORAL THREE TIMES A DAY, (

Reported)


Metronidazole* (Flagyl*), 500 MG ORAL EVERY 8 HOURS, (Reported)


Sucralfate* (Carafate*), 1 GM ORAL FOUR TIMES A DAY, (Reported)


Terazosin HCl (Terazosin HCl), 10 MG PO QHS, (Reported)


Warfarin Sod* (Coumadin*), 5 MG ORAL DAILY, (Reported)





Scheduled PRN


Hydrocodone/Acetaminophen (Hydrocodon-Acetaminophn ), 1 TAB ORAL Q4H PRN 

for For Pain, (Reported)


Metoclopramide Hcl* (Reglan*), 10 MG PO TID PRN, (Reported)


Nitroglycerin (Nitrostat), 0.4 MG SL, (Reported)


Ondansetron (Zofran), 4 MG ORAL Q8H PRN, (Reported)





Patient History


History Provided By:  Patient


Healthcare decision maker





Resuscitation status


Full Code


Advanced Directive on File








Past Medical/Surgical History


Past Medical/Surgical History:  


(1) CHF (congestive heart failure)


(2) Anemia


(3) Diabetes


(4) HTN (hypertension)


(5) Gastroparesis


(6) Presence of IVC filter


(7) DVT (deep venous thrombosis)


(8) HIV (human immunodeficiency virus infection)


(9) Hematuria


(10) Narcotic dependence


(11) Chronic pancreatitis





Family History


Family History:  


(1) Medical history non-contributory





Social History


Social History:  


(1) Lives with family


(2) Denies alcohol consumption


(3) Patient denies drug use


(4) Does not smoke





Review of Systems


Constitutional:  Reports: weakness


Eye:  Denies: acuity changes, blurred vision, discharge, double vision, eye pain

, no symptoms, nose congestion, nose pain, other, see HPI, tearing


ENT:  Denies: ear discharge, ear pain, hearing loss, mouth pain, nasal discharge

, no symptoms, nose congestion, nose pain, other, see HPI, throat pain, throat 

swelling


Respiratory:  Denies: CROUCH, cough, no symptoms, orthopnea, other, see HPI, 

shortness of breath, sputum, stridor, wheezing


Cardiovascular:  Denies: PND, chest pain, edema, no symptoms, other, 

palpitations, see HPI, syncope


Gastrointestinal:  Reports: abdominal pain, diarrhea, nausea, vomiting


Genitourinary:  Denies: discharge, dysuria, frequency, hematuria, incontinence, 

no symptoms, other, pain, retention, see HPI, urgency, vag bleed/dc


Musculoskeletal:  Denies: back pain, gout, joint pain, joint swelling, muscle 

pain, muscle stiffness, no symptoms, other, see HPI


Skin:  Denies: change in color, change in hair/nails, dryness, lesions, no 

symptoms, other, rash, see HPI


Psychiatric:  Denies: HI, SI, anxiety, depressed feelings, emotional problems, 

hallucinations, no symptoms, other, prior hx, see HPI


Neurological:  Denies: dizziness, focal weakness, headache, no symptoms, 

numbness, other, paresthesia, see HPI, seizure, syncope, tingling, tremors


Endocrine:  Denies: excessive sweating, flushing, increased thirst, increased 

urine, intolerance to temperature, no symptoms, other, see HPI, unexplained 

weight loss


Hematologic/Lymphatic:  Denies: anemia, blood clots, diathesis, easy bleeding, 

easy bruising, no symptoms, other, see HPI, swollen glands





Physical Exam


General Appearance:  alert


Lines, tubes and drains:  peripheral


HEENT:  normocephalic, atraumatic


Neck:  normal alignment, supple, normal inspection


Respiratory/Chest:  lungs clear, normal breath sounds, no respiratory distress


Cardiovascular/Chest:  normal rate, regular rhythm, no JVD


Abdomen:  non tender, soft, no organomegaly, no mass


Extremities:  normal range of motion, non-tender, normal inspection, no calf 

tenderness, normal capillary refill


Skin Exam:  normal pigmentation, warm/dry


Neurologic:  alert, oriented x 3, responsive, normal mood/affect





Last 24 Hour Vital Signs








  Date Time  Temp Pulse Resp B/P Pulse Ox O2 Delivery O2 Flow Rate FiO2


 


1/21/17 11:20 97.0       


 


1/21/17 08:47    117/67    


 


1/21/17 08:35 97.0 70 20 117/67 96 Room Air  


 


1/21/17 04:00 97.5 74 20 126/85 98 Room Air  


 


1/21/17 02:28 97.2 62 21 133/88 99 Room Air  


 


1/21/17 02:20 97.3 55 20 142/89 91 Room Air  


 


1/21/17 01:48  62 21 133/88 99 Room Air  


 


1/21/17 00:32  75 12 130/73 97 Room Air  


 


1/20/17 22:52 97.2       


 


1/20/17 22:50  65 16 143/77 98 Room Air  


 


1/20/17 19:56  86 19 135/81 95 Room Air  


 


1/20/17 19:46 97.2 90 21 147/72 100 Room Air  

















Intake and Output  


 


 1/20/17 1/21/17





 19:00 07:00


 


Intake Total  100 ml


 


Balance  100 ml


 


  


 


IV Total  100 ml


 


# Voids  8











Laboratory Tests








Test


  1/20/17


20:58 1/20/17


22:00 1/21/17


03:20


 


Urine Color Yellow    


 


Urine Appearance Cloudy    


 


Urine pH 5 (4.5-8.0)    


 


Urine Specific Gravity


  1.025


(1.005-1.035) 


  


 


 


Urine Protein


  2+ (NEGATIVE)


H 


  


 


 


Urine Glucose (UA)


  Negative


(NEGATIVE) 


  


 


 


Urine Ketones


  1+ (NEGATIVE)


H 


  


 


 


Urine Occult Blood


  5+ (NEGATIVE)


H 


  


 


 


Urine Nitrite


  Negative


(NEGATIVE) 


  


 


 


Urine Bilirubin


  Negative


(NEGATIVE) 


  


 


 


Urine Urobilinogen


  Normal MG/DL


(0.0-1.0) 


  


 


 


Urine Leukocyte Esterase


  1+ (NEGATIVE)


H 


  


 


 


Urine RBC


  Tntc /HPF (0 -


0)  H 


  


 


 


Urine WBC


  Tntc /HPF (0 -


0)  H 


  


 


 


Urine Squamous Epithelial


Cells Few /LPF


(NONE/OCC) 


  


 


 


Urine Bacteria


  Moderate /HPF


(NONE)  H 


  


 


 


Sodium Level


  148 mEQ/L


(135-145)  H 


  143 mEQ/L


(135-145)


 


Potassium Level


  2.7 mEQ/L


(3.4-4.9)  *L 


  4.8 mEQ/L


(3.4-4.9)  #


 


Chloride Level


  120 mEQ/L


()  H 


  106 mEQ/L


()


 


Carbon Dioxide Level


  16 mEQ/L


(20-30)  L 


  23 mEQ/L


(20-30)


 


Anion Gap 12 (5-15)    14 (5-15)  


 


Blood Urea Nitrogen


  12 mg/dL


(7-23) 


  17 mg/dL


(7-23)


 


Creatinine


  1.1 mg/dL


(0.7-1.2) 


  1.5 mg/dL


(0.7-1.2)  H


 


Estimat Glomerular Filtration


Rate > 60 mL/min


(>60) 


  59.5 mL/min


(>60)


 


Glucose Level


  85 mg/dL


() 


  111 mg/dL


()  H


 


Calcium Level


  5.6 mg/dL


(8.6-10.2)  *L 


  9.5 mg/dL


(8.6-10.2)  #


 


Total Bilirubin


  < 0.2 mg/dL


(0.0-1.2) 


  0.3 mg/dL


(0.0-1.2)


 


Aspartate Amino Transf


(AST/SGOT) 16 U/L (5-40)  


  


  27 U/L (5-40)  


 


 


Alanine Aminotransferase


(ALT/SGPT) 12 U/L (3-41)  


  


  20 U/L (3-41)  


 


 


Alkaline Phosphatase


  73 U/L


() 


  132 U/L


()  H


 


Total Protein


  5.0 g/dL


(6.6-8.7)  L 


  8.2 g/dL


(6.6-8.7)  #


 


Albumin


  2.5 g/dL


(3.5-5.2)  L 


  4.1 g/dL


(3.5-5.2)


 


Globulin 2.5 g/dL    4.1 g/dL  


 


Albumin/Globulin Ratio 1.0 (1.0-2.7)    1.0 (1.0-2.7)  


 


Lipase 53 U/L (< 60)    


 


White Blood Count


  


  5.2 K/UL


(4.8-10.8) 5.0 K/UL


(4.8-10.8)


 


Red Blood Count


  


  4.38 M/UL


(4.70-6.10)  L 4.38 M/UL


(4.70-6.10)  L


 


Hemoglobin


  


  11.8 G/DL


(14.2-18.0)  L 11.9 G/DL


(14.2-18.0)  L


 


Hematocrit


  


  39.2 %


(42.0-52.0)  L 38.0 %


(42.0-52.0)  L


 


Mean Corpuscular Volume  89 FL (80-99)   87 FL (80-99)  


 


Mean Corpuscular Hemoglobin


  


  26.8 PG


(27.0-31.0)  L 27.1 PG


(27.0-31.0)


 


Mean Corpuscular Hemoglobin


Concent 


  30.0 G/DL


(32.0-36.0)  L 31.2 G/DL


(32.0-36.0)  L


 


Red Cell Distribution Width


  


  15.4 %


(11.6-14.8)  H 15.0 %


(11.6-14.8)  H


 


Platelet Count


  


  161 K/UL


(150-450) 172 K/UL


(150-450)


 


Mean Platelet Volume


  


  6.9 FL


(6.5-10.1) 7.1 FL


(6.5-10.1)


 


Neutrophils (%) (Auto)


  


  44.6 %


(45.0-75.0)  L 42.1 %


(45.0-75.0)  L


 


Lymphocytes (%) (Auto)


  


  39.1 %


(20.0-45.0) 44.6 %


(20.0-45.0)


 


Monocytes (%) (Auto)


  


  12.8 %


(1.0-10.0)  H 9.0 %


(1.0-10.0)


 


Eosinophils (%) (Auto)


  


  1.9 %


(0.0-3.0) 2.4 %


(0.0-3.0)


 


Basophils (%) (Auto)


  


  1.7 %


(0.0-2.0) 1.8 %


(0.0-2.0)


 


Differential Total Cells


Counted 


  


  100  


 


 


Neutrophils % (Manual)   46 % (45-75)  


 


Lymphocytes % (Manual)   37 % (20-45)  


 


Monocytes % (Manual)   12 % (1-10)  H


 


Eosinophils % (Manual)   1 % (0-3)  


 


Basophils % (Manual)   0 % (0-2)  


 


Band Neutrophils   4 % (0-8)  


 


Platelet Estimate   Adequate  


 


Platelet Morphology   Normal  


 


Red Blood Cell Morphology   Normal  


 


Erythrocyte Sedimentation Rate


  


  


  70 MM/HR


(0-20)  H


 


Reticulocyte Count


  


  


  0.8 %


(0.0-2.0)


 


Prothrombin Time


  


  


  11.9 SEC


(9.30-11.50)  H


 


Prothromb Time International


Ratio 


  


  1.2 (0.9-1.1)


H


 


Activated Partial


Thromboplast Time 


  


  25 SEC (23-33)


 


 


Iron Level


  


  


  61 ug/dL


()


 


Total Iron Binding Capacity


  


  


  394 ug/dL


(250-400)


 


Percent Iron Saturation   15 % (15-50)  


 


Unsaturated Iron Binding


  


  


  333 ug/dL


(112-346)


 


Ferritin


  


  


  28 ng/mL


()


 


Lactate Dehydrogenase


  


  


  262 U/L


(135-230)  H


 


Amylase Level


  


  


  221 U/L


()  H


 


Carcinoembryonic Antigen   4.8 ng/mL  H


 


Vitamin B12 Level


  


  


  468 pg/mL


(211-946)


 


Folate   Pending  








Height (Feet):  6


Height (Inches):  1.00


Weight (Pounds):  239


Medications





Current Medications








 Medications


  (Trade)  Dose


 Ordered  Sig/Kirtsen


 Route


 PRN Reason  Start Time


 Stop Time Status Last Admin


Dose Admin


 


 Acetaminophen


  (Tylenol)  650 mg  Q4H  PRN


 ORAL


 fever  1/20/17 23:30


 2/19/17 23:29   


 


 


 Al Hydroxide/Mg


 Hydroxide


  (Mylanta II)  30 ml  Q6H  PRN


 ORAL


 dyspepsia  1/20/17 23:30


 2/19/17 23:29   


 


 


 Ceftriaxone


 Sodium/Dextrose


  (Rocephin/D5W)  55 ml @ 


 110 mls/hr  Q24H


 IVPB


   1/21/17 11:00


 1/28/17 10:59  1/21/17 11:40


 


 


 Dextrose     STAT  PRN


 IV


 Hypoglycemia  1/20/17 23:30


 2/19/17 23:29   


 


 


 Dextrose/Sodium


 Chloride


  (D5 0.45% NS)  1,000 ml @ 


 50 mls/hr  Q20H


 IV


   1/21/17 07:00


 2/20/17 06:59  1/21/17 07:34


 


 


 Diphenhydramine


 HCl


  (Benadryl)  25 mg  Q6H  PRN


 IVP


 Itching  1/21/17 10:15


 2/20/17 10:14  1/21/17 12:45


 


 


 Enalapril Maleate


  (Vasotec)  10 mg  DAILY


 ORAL


   1/21/17 09:00


 2/20/17 08:59   


 


 


 Morphine Sulfate


 4 mg  4 mg  Q4H  PRN


 IVP


 For Pain (Moderate to Severe)  1/21/17 10:45


 1/28/17 10:44  1/21/17 10:50


 


 


 Nitroglycerin


  (Ntg)  0.4 mg  Q5M X 3 DOSES PRN


 SL


 Prn Chest Pain  1/20/17 23:30


 2/19/17 23:29   


 


 


 Ondansetron HCl


  (Zofran)  4 mg  Q6H  PRN


 IVP


 Nausea & Vomiting  1/20/17 23:30


 2/19/17 23:29  1/21/17 07:54


 


 


 Polyethylene


 Glycol


  (Miralax)  17 gm  HSPRN  PRN


 ORAL


 Constipation  1/20/17 23:30


 2/19/17 23:29   


 


 


 Temazepam


  (Restoril)  15 mg  HSPRN  PRN


 ORAL


 Insomnia  1/20/17 23:30


 1/27/17 23:29   


 


 


 Warfarin Sodium


  (Coumadin per


 pharmacy)  1 ea  DAILYPRN  PRN


 MISC


 Per rx protocol  1/21/17 07:00


 2/20/17 06:59   


 








Objective Narrative


CT Abdomen Pelvis WO Contrast





Impression:





Limited evaluation of solid organs and bowel without intravenous or oral 

contrast.





Nonobstructive left renal calculus. No hydronephrosis.





IVC filter.





Colonic diverticulosis.





Small hiatal hernia.





Assessment/Plan


Problem List:  


(1) Hyperkalemia


ICD Codes:  E87.5 - Hyperkalemia


SNOMED:  12388303


(2) Nausea, vomiting, and diarrhea


ICD Codes:  R11.2 - Nausea with vomiting, unspecified; R19.7 - Diarrhea, 

unspecified


SNOMED:  1658670


(3) Abdominal pain


ICD Codes:  R10.9 - Unspecified abdominal pain


SNOMED:  98737589


(4) Hypernatremia


ICD Codes:  E87.0 - Hyperosmolality and hypernatremia


SNOMED:  70613995


(5) UTI (urinary tract infection)


ICD Codes:  N39.0 - Urinary tract infection, site not specified


SNOMED:  41779397


(6) HIV (human immunodeficiency virus infection)


ICD Codes:  Z21 - Asymptomatic human immunodeficiency virus [HIV] infection 

status


SNOMED:  78075482


(7) Hematuria


ICD Codes:  R31.9 - Hematuria, unspecified


SNOMED:  15446623


Status:  stable


Assessment/Plan


Hypokalemia  - resolved


Hypernatremia - resolved


hypocalcemia - resolved


GI consult pending


Continue NPO


Continue hydration


Monitor lytes


Monitor Intake and output


Zofran PRN vomiting


Pain management


ID f/u 


Abx per ID


AM labs











Berenice Carroll N.P. Jan 21, 2017 15:21
History of Present Illness


General


Date patient seen:  Jan 20, 2017


Chief Complaint:  Nausea, Vomiting, and Diarrhea


Referring physician:  Dr Zambrano


Reason for Consultation:  Manage multiple co-moridities





Present Illness


HPI


Patient is a 52 yo gentleman with extensive past medical history, whom reports 

2 to 3 days of hematemesis. 


Initially patient apparently was non-complaint with his doctors orders to 

immediately present to the emergency room for evaluation


of his serious symptoms, however the patient admits to me he was very busy 

during the time of his initial hematemesis and did


not have time to come to hospital.  However the patient presents after a few 

days of hematemesis and chronic pain related complaints. 





I was asked to consult on this case due to the multiple co-morbidities and at 

this time the patient appears to be stable.  


Patient iwill be admitted to the hospital for prompt evaluation by GI 

specialist to investigate the patients apparent gastrointestinal


ailment.


Allergies:  


Coded Allergies:  


     IODINATED CONTRAST MEDIA - IV DYE (Verified  Allergy, Severe, Shortness of 

Breath, 9/16/15)


     DORIPENEM (Verified  Allergy, Intermediate, Itching, 9/16/15)


     KETOROLAC TROMETHAMINE (Verified  Allergy, Intermediate, Hives, 9/16/15)


     TRAMADOL (Verified  Allergy, Intermediate, Hives, 9/16/15)


     ASPIRIN (Verified  Allergy, Mild, 12/7/10)





Medication History


Scheduled


Aspirin* (Aspir 81*), 81 MG ORAL DAILY, (Reported)


Carvedilol (Coreg), 6.25 MG ORAL DAILY, (Reported)


Enalapril Maleate* (Vasotec*), 10 MG ORAL DAILY, (Reported)


Esomeprazole Magnesium (Nexium), 40 MG ORAL DAILY, (Reported)


Ferrous Sulfate* (Ferrous Sulfate*), 325 MG ORAL DAILY, (Reported)


Gabapentin (Neurontin), 300 MG ORAL THREE TIMES A DAY, (Reported)


Isosorbide Mononitrate* (Imdur*), 60 MG ORAL DAILY, (Reported)


Lactobacillus Rhamnosus Gg* (Culturelle*), 1 CAP ORAL THREE TIMES A DAY, (

Reported)


Metronidazole* (Flagyl*), 500 MG ORAL EVERY 8 HOURS, (Reported)


Sucralfate* (Carafate*), 1 GM ORAL FOUR TIMES A DAY, (Reported)


Terazosin HCl (Terazosin HCl), 10 MG PO QHS, (Reported)


Warfarin Sod* (Coumadin*), 5 MG ORAL DAILY, (Reported)





Scheduled PRN


Hydrocodone/Acetaminophen (Hydrocodon-Acetaminophn ), 1 TAB ORAL Q4H PRN 

for For Pain, (Reported)


Metoclopramide Hcl* (Reglan*), 10 MG PO TID PRN, (Reported)


Nitroglycerin (Nitrostat), 0.4 MG SL, (Reported)


Ondansetron (Zofran), 4 MG ORAL Q8H PRN, (Reported)





Patient History


Healthcare decision maker





Resuscitation status





Advanced Directive on File








Past Medical/Surgical History


Past Medical/Surgical History:  


(1) Hematuria


(2) upper


(3) Pancytopenia


(4) Acute GI bleeding


(5) C. difficile colitis


(6) Anemia, chronic disease


(7) Narcotic dependence


(8) Chronic pancreatitis


(9) HTN (hypertension)


(10) Gastroparesis


(11) UGIB (upper gastrointestinal bleed)


(12) UTI (urinary tract infection)


(13) Intractable abdominal pain


(14) CHF (congestive heart failure)


(15) Anemia


(16) Presence of IVC filter


(17) DVT (deep venous thrombosis)


(18) HIV (human immunodeficiency virus infection)





Review of Systems


Constitutional:  Reports: malaise, weakness


Gastrointestinal:  Reports: abdominal pain, hematemesis, nausea, vomiting





Physical Exam


General Appearance:  moderate distress


Lines, tubes and drains:  peripheral


HEENT:  normocephalic, atraumatic, anicteric, PERRL


Neck:  non-tender, normal alignment, supple


Respiratory/Chest:  chest wall non-tender, normal breath sounds, no respiratory 

distress


Breasts:  no masses


Cardiovascular/Chest:  tachycardia


Abdomen:  hyperactive bowel sounds, guarding, rebound, tender


Genitourinary/Rectal:  normal genital exam, normal rectal exam


Extremities:  normal range of motion, non-tender, normal inspection


Skin Exam:  normal pigmentation


Neurologic:  CNs II-XII grossly normal, no motor/sensory deficits





Last 24 Hour Vital Signs








  Date Time  Temp Pulse Resp B/P Pulse Ox O2 Delivery O2 Flow Rate FiO2


 


1/20/17 22:52 97.2       


 


1/20/17 22:50  65 16 143/77 98 Room Air  


 


1/20/17 19:56  86 19 135/81 95 Room Air  


 


1/20/17 19:46 97.2 90 21 147/72 100 Room Air  











Laboratory Tests








Test


  1/20/17


20:58 1/20/17


22:00


 


Urine Color Yellow   


 


Urine Appearance Cloudy   


 


Urine pH 5 (4.5-8.0)   


 


Urine Specific Gravity


  1.025


(1.005-1.035) 


 


 


Urine Protein


  2+ (NEGATIVE)


H 


 


 


Urine Glucose (UA)


  Negative


(NEGATIVE) 


 


 


Urine Ketones


  1+ (NEGATIVE)


H 


 


 


Urine Occult Blood


  5+ (NEGATIVE)


H 


 


 


Urine Nitrite


  Negative


(NEGATIVE) 


 


 


Urine Bilirubin


  Negative


(NEGATIVE) 


 


 


Urine Urobilinogen


  Normal MG/DL


(0.0-1.0) 


 


 


Urine Leukocyte Esterase


  1+ (NEGATIVE)


H 


 


 


Urine RBC


  Tntc /HPF (0 -


0)  H 


 


 


Urine WBC


  Tntc /HPF (0 -


0)  H 


 


 


Urine Squamous Epithelial


Cells Few /LPF


(NONE/OCC) 


 


 


Urine Bacteria


  Moderate /HPF


(NONE)  H 


 


 


Sodium Level


  148 mEQ/L


(135-145)  H 


 


 


Potassium Level


  2.7 mEQ/L


(3.4-4.9)  *L 


 


 


Chloride Level


  120 mEQ/L


()  H 


 


 


Carbon Dioxide Level


  16 mEQ/L


(20-30)  L 


 


 


Anion Gap 12 (5-15)   


 


Blood Urea Nitrogen


  12 mg/dL


(7-23) 


 


 


Creatinine


  1.1 mg/dL


(0.7-1.2) 


 


 


Estimat Glomerular Filtration


Rate > 60 mL/min


(>60) 


 


 


Glucose Level


  85 mg/dL


() 


 


 


Calcium Level


  5.6 mg/dL


(8.6-10.2)  *L 


 


 


Total Bilirubin


  < 0.2 mg/dL


(0.0-1.2) 


 


 


Aspartate Amino Transf


(AST/SGOT) 16 U/L (5-40)  


  


 


 


Alanine Aminotransferase


(ALT/SGPT) 12 U/L (3-41)  


  


 


 


Alkaline Phosphatase


  73 U/L


() 


 


 


Total Protein


  5.0 g/dL


(6.6-8.7)  L 


 


 


Albumin


  2.5 g/dL


(3.5-5.2)  L 


 


 


Globulin 2.5 g/dL   


 


Albumin/Globulin Ratio 1.0 (1.0-2.7)   


 


Lipase 53 U/L (< 60)   


 


White Blood Count


  


  5.2 K/UL


(4.8-10.8)


 


Red Blood Count


  


  4.38 M/UL


(4.70-6.10)  L


 


Hemoglobin


  


  11.8 G/DL


(14.2-18.0)  L


 


Hematocrit


  


  39.2 %


(42.0-52.0)  L


 


Mean Corpuscular Volume  89 FL (80-99)  


 


Mean Corpuscular Hemoglobin


  


  26.8 PG


(27.0-31.0)  L


 


Mean Corpuscular Hemoglobin


Concent 


  30.0 G/DL


(32.0-36.0)  L


 


Red Cell Distribution Width


  


  15.4 %


(11.6-14.8)  H


 


Platelet Count


  


  161 K/UL


(150-450)


 


Mean Platelet Volume


  


  6.9 FL


(6.5-10.1)


 


Neutrophils (%) (Auto)


  


  44.6 %


(45.0-75.0)  L


 


Lymphocytes (%) (Auto)


  


  39.1 %


(20.0-45.0)


 


Monocytes (%) (Auto)


  


  12.8 %


(1.0-10.0)  H


 


Eosinophils (%) (Auto)


  


  1.9 %


(0.0-3.0)


 


Basophils (%) (Auto)


  


  1.7 %


(0.0-2.0)








Height (Feet):  6


Height (Inches):  1.00


Weight (Pounds):  239


Medications





Current Medications








 Medications


  (Trade)  Dose


 Ordered  Sig/Kirsten


 Route


 PRN Reason  Start Time


 Stop Time Status Last Admin


Dose Admin


 


 Acetaminophen


  (Tylenol)  650 mg  Q4H  PRN


 ORAL


 fever  1/20/17 23:30


 2/19/17 23:29 UNV  


 


 


 Al Hydroxide/Mg


 Hydroxide


  (Mylanta II)  30 ml  Q6H  PRN


 ORAL


 dyspepsia  1/20/17 23:30


 2/19/17 23:29 UNV  


 


 


 Dextrose


  (Dextrose 50%)    STAT  PRN


 IV


 Hypoglycemia  1/20/17 23:30


 2/19/17 23:29 UNV  


 


 


 Diphenhydramine


 HCl


  (Benadryl)  25 mg  Q6H  PRN


 ORAL


 Itching/Pruritis  1/20/17 23:30


 2/19/17 23:29 UNV  


 


 


 Enalapril Maleate


  (Vasotec)  10 mg  DAILY


 ORAL


   1/21/17 09:00


 2/20/17 08:59 UNV  


 


 


 Nitroglycerin


  (Ntg)  0.4 mg  Q5M X 3 DOSES PRN


 SL


 Prn Chest Pain  1/20/17 23:30


 2/19/17 23:29 UNV  


 


 


 Ondansetron HCl


  (Zofran)  4 mg  Q6H  PRN


 IVP


 Nausea & Vomiting  1/20/17 23:30


 2/19/17 23:29 UNV  


 


 


 Polyethylene


 Glycol


  (Miralax)  17 gm  HSPRN  PRN


 ORAL


 Constipation  1/20/17 23:30


 2/19/17 23:29 UNV  


 


 


 Potassium Chloride  100 ml @ 


 50 mls/hr  ONCE  ONCE


 IVPB


   1/20/17 22:15


 1/21/17 00:14  1/20/17 22:23


 


 


 Potassium Chloride


  (KCl 20mEq/100ml


 Premix)  100 ml @ 


 50 mls/hr  ONCE  ONCE


 IVPB


   1/20/17 22:30


 1/21/17 00:29   


 


 


 Sucralfate


  (Carafate)  1 gm  FOUR TIMES A  DAY


 ORAL


   1/21/17 09:00


 2/20/17 08:59 UNV  


 


 


 Temazepam


  (Restoril)  15 mg  HSPRN  PRN


 ORAL


 Insomnia  1/20/17 23:30


 1/27/17 23:29 UNV  


 











Assessment/Plan


Problem List:  


(1) UGIB (upper gastrointestinal bleed)


ICD Codes:  K92.2 - Gastrointestinal hemorrhage, unspecified


SNOMED:  38776293


(2) Gastroparesis


ICD Codes:  K31.84 - Gastroparesis


SNOMED:  803627697


(3) Chronic pancreatitis


ICD Codes:  K86.1 - Chronic pancreatitis


SNOMED:  398264404


(4) HTN (hypertension)


ICD Codes:  I10 - HTN (hypertension)


SNOMED:  07397925


Qualifiers:  


   Qualified Codes:  I10 - Essential (primary) hypertension


(5) Narcotic dependence


ICD Codes:  F19.20 - Narcotic dependence


SNOMED:  39133909


(6) Diabetes


ICD Codes:  E11.9 - Diabetes


SNOMED:  84204286


Qualifiers:  


   Qualified Codes:  E11.8 - Type 2 diabetes mellitus with unspecified 

complications; Z79.4 - Long term (current) use of insulin


Status:  stable


Assessment/Plan


(1) HIV (human immunodeficiency virus infection)


ICD Codes:  Z21 - Asymptomatic human immunodeficiency virus [HIV] infection 

status


SNOMED:  60978241


(2) GI bleeding


(3) Abdominal pain of unknown etiology


ICD Codes:  R10.9 - Unspecified abdominal pain


SNOMED:  165352718


(4) Intractable vomiting


ICD Codes:  R11.10 - Vomiting, unspecified


SNOMED:  283007622


(5) Gastritis


ICD Codes:  K29.70 - Gastritis


SNOMED:  9006390


Status:  stable


Status Narrative


Discussed with Dr. Cleveland.


Assessment/Plan


iron panel WNL


stable H&H


elevated lipase


HIV +


coffee grounds per patient





Plan


GI specialist to follow up 


EGD


low fat diet tolerating


ordered OB stool


urine toxicology


ppi


fu labs











CHAVO DUNBAR Jan 20, 2017 23:39
Post Partum Progress Note: Vaginal delivery     MORIAH HURLEY is a 22y  s/p vaginal delivery PPD #1  of female infant    Patient was seen and examined with infant at bedside     SUBJECTIVE:  bilateral calf pain yesterday with dopplers WNL  Denies calf tenderness at this time  Reports feeling well this morning  Pain is well controlled with PRN pain medication  Tolerating PO without N/V  +flatus  No BM  Voiding spontaneously  Ambulating without assistance  minimal lochia   She is breastfeeding and the baby is latching on  Denies fevers, chills, shortness of breath, chest pain, headaches or vision changes       OBJECTIVE:  Physical exam:  General: AOx3, NAD.  Heart: RRR. S1S2.  Lungs: CTABL. Good airflow bilaterally.   Abdomen: +BS, Soft, appropriately tender, nondistended, no guarding or rebound tenderness, firm uterine fundus at umbilicus  Ext: No DVT signs, warm extremities.    Vital Signs Last 24 Hrs  T(C): 36.7 (01 Sep 2020 03:15), Max: 36.9 (31 Aug 2020 06:37)  T(F): 98.1 (01 Sep 2020 03:15), Max: 98.4 (31 Aug 2020 06:37)  HR: 76 (01 Sep 2020 03:15) (73 - 76)  BP: 100/71 (01 Sep 2020 03:15) (100/71 - 124/76)  RR: 18 (01 Sep 2020 03:15) (18 - 20)  SpO2: 98% (01 Sep 2020 03:15) (98% - 99%)    LABS:                        14.4   13.60 )-----------( 432      ( 31 Aug 2020 06:06 )             42.6       COVID-19 PCR: NotDetec (20 @ 06:00)  Antibody Screen: NEG (20 @ 05:54)